# Patient Record
Sex: FEMALE | Race: WHITE | Employment: OTHER
[De-identification: names, ages, dates, MRNs, and addresses within clinical notes are randomized per-mention and may not be internally consistent; named-entity substitution may affect disease eponyms.]

---

## 2017-01-05 DIAGNOSIS — I99.9 VASCULAR DISEASE: Primary | ICD-10-CM

## 2017-01-10 DIAGNOSIS — E11.9 TYPE 2 DIABETES MELLITUS WITHOUT COMPLICATION, WITHOUT LONG-TERM CURRENT USE OF INSULIN (HCC): Primary | ICD-10-CM

## 2017-01-10 RX ORDER — LANCETS 30 GAUGE
EACH MISCELLANEOUS
Qty: 100 EACH | Refills: 1 | Status: SHIPPED | OUTPATIENT
Start: 2017-01-10 | End: 2017-03-01 | Stop reason: SDUPTHER

## 2017-01-10 RX ORDER — ATORVASTATIN CALCIUM 20 MG/1
TABLET, FILM COATED ORAL
Qty: 30 TABLET | Refills: 1 | Status: SHIPPED | OUTPATIENT
Start: 2017-01-10 | End: 2017-03-22 | Stop reason: SDUPTHER

## 2017-02-07 ENCOUNTER — OFFICE VISIT (OUTPATIENT)
Dept: FAMILY MEDICINE CLINIC | Facility: CLINIC | Age: 57
End: 2017-02-07

## 2017-02-07 VITALS
SYSTOLIC BLOOD PRESSURE: 126 MMHG | WEIGHT: 176.2 LBS | HEART RATE: 101 BPM | HEIGHT: 66 IN | TEMPERATURE: 97.8 F | DIASTOLIC BLOOD PRESSURE: 64 MMHG | BODY MASS INDEX: 28.32 KG/M2

## 2017-02-07 DIAGNOSIS — S98.132D: ICD-10-CM

## 2017-02-07 DIAGNOSIS — N18.30 CHRONIC KIDNEY DISEASE, STAGE III (MODERATE) (HCC): ICD-10-CM

## 2017-02-07 DIAGNOSIS — S98.132A: ICD-10-CM

## 2017-02-07 DIAGNOSIS — E11.51 DIABETES MELLITUS WITH PERIPHERAL VASCULAR DISEASE (HCC): Primary | ICD-10-CM

## 2017-02-07 DIAGNOSIS — H10.9 CONJUNCTIVITIS OF RIGHT EYE, UNSPECIFIED CONJUNCTIVITIS TYPE: ICD-10-CM

## 2017-02-07 DIAGNOSIS — I71.40 ABDOMINAL AORTIC ANEURYSM (AAA) WITHOUT RUPTURE: ICD-10-CM

## 2017-02-07 PROCEDURE — 99214 OFFICE O/P EST MOD 30 MIN: CPT | Performed by: FAMILY MEDICINE

## 2017-02-07 RX ORDER — GLIMEPIRIDE 2 MG/1
2 TABLET ORAL EVERY MORNING
Qty: 30 TABLET | Refills: 1 | Status: SHIPPED | OUTPATIENT
Start: 2017-02-07 | End: 2017-02-21 | Stop reason: DRUGHIGH

## 2017-02-07 RX ORDER — ARIPIPRAZOLE 5 MG/1
5 TABLET ORAL DAILY
COMMUNITY
End: 2017-04-04 | Stop reason: ALTCHOICE

## 2017-02-07 ASSESSMENT — ENCOUNTER SYMPTOMS
EYE ITCHING: 1
COUGH: 0
ABDOMINAL PAIN: 0
EYE REDNESS: 1
NAUSEA: 0
SHORTNESS OF BREATH: 0
SORE THROAT: 0

## 2017-02-07 ASSESSMENT — PATIENT HEALTH QUESTIONNAIRE - PHQ9
SUM OF ALL RESPONSES TO PHQ9 QUESTIONS 1 & 2: 0
1. LITTLE INTEREST OR PLEASURE IN DOING THINGS: 0
2. FEELING DOWN, DEPRESSED OR HOPELESS: 0
SUM OF ALL RESPONSES TO PHQ QUESTIONS 1-9: 0

## 2017-02-20 RX ORDER — LISINOPRIL 10 MG/1
TABLET ORAL
Qty: 30 TABLET | Refills: 1 | Status: SHIPPED | OUTPATIENT
Start: 2017-02-20 | End: 2017-04-23 | Stop reason: SDUPTHER

## 2017-02-21 ENCOUNTER — OFFICE VISIT (OUTPATIENT)
Dept: FAMILY MEDICINE CLINIC | Facility: CLINIC | Age: 57
End: 2017-02-21

## 2017-02-21 VITALS
SYSTOLIC BLOOD PRESSURE: 136 MMHG | WEIGHT: 177 LBS | HEART RATE: 111 BPM | HEIGHT: 66 IN | TEMPERATURE: 98.3 F | BODY MASS INDEX: 28.45 KG/M2 | DIASTOLIC BLOOD PRESSURE: 78 MMHG

## 2017-02-21 DIAGNOSIS — E78.5 HYPERLIPIDEMIA, UNSPECIFIED HYPERLIPIDEMIA TYPE: ICD-10-CM

## 2017-02-21 DIAGNOSIS — I10 ESSENTIAL HYPERTENSION: ICD-10-CM

## 2017-02-21 DIAGNOSIS — E11.9 TYPE 2 DIABETES MELLITUS WITHOUT COMPLICATION, WITHOUT LONG-TERM CURRENT USE OF INSULIN (HCC): Primary | ICD-10-CM

## 2017-02-21 PROCEDURE — 99213 OFFICE O/P EST LOW 20 MIN: CPT | Performed by: FAMILY MEDICINE

## 2017-02-21 RX ORDER — METOPROLOL SUCCINATE 25 MG/1
25 TABLET, EXTENDED RELEASE ORAL DAILY
Qty: 30 TABLET | Refills: 3 | Status: SHIPPED | OUTPATIENT
Start: 2017-02-21 | End: 2017-06-20 | Stop reason: SDUPTHER

## 2017-02-21 RX ORDER — GLIMEPIRIDE 4 MG/1
4 TABLET ORAL EVERY MORNING
Qty: 30 TABLET | Refills: 3 | Status: SHIPPED | OUTPATIENT
Start: 2017-02-21 | End: 2017-04-04 | Stop reason: DRUGHIGH

## 2017-02-21 ASSESSMENT — ENCOUNTER SYMPTOMS
ABDOMINAL PAIN: 0
NAUSEA: 0
SORE THROAT: 0
SHORTNESS OF BREATH: 0

## 2017-02-21 ASSESSMENT — PATIENT HEALTH QUESTIONNAIRE - PHQ9
2. FEELING DOWN, DEPRESSED OR HOPELESS: 0
SUM OF ALL RESPONSES TO PHQ9 QUESTIONS 1 & 2: 0
1. LITTLE INTEREST OR PLEASURE IN DOING THINGS: 0
SUM OF ALL RESPONSES TO PHQ QUESTIONS 1-9: 0

## 2017-03-01 DIAGNOSIS — E11.9 TYPE 2 DIABETES MELLITUS WITHOUT COMPLICATION, WITHOUT LONG-TERM CURRENT USE OF INSULIN (HCC): ICD-10-CM

## 2017-03-02 RX ORDER — LANCETS 30 GAUGE
EACH MISCELLANEOUS
Qty: 100 EACH | Refills: 1 | Status: SHIPPED | OUTPATIENT
Start: 2017-03-02 | End: 2018-05-03 | Stop reason: SDUPTHER

## 2017-03-22 RX ORDER — ATORVASTATIN CALCIUM 20 MG/1
TABLET, FILM COATED ORAL
Qty: 30 TABLET | Refills: 1 | Status: SHIPPED | OUTPATIENT
Start: 2017-03-22 | End: 2017-05-27 | Stop reason: SDUPTHER

## 2017-04-04 ENCOUNTER — OFFICE VISIT (OUTPATIENT)
Dept: FAMILY MEDICINE CLINIC | Age: 57
End: 2017-04-04
Payer: MEDICARE

## 2017-04-04 VITALS
WEIGHT: 178 LBS | HEIGHT: 66 IN | SYSTOLIC BLOOD PRESSURE: 124 MMHG | DIASTOLIC BLOOD PRESSURE: 66 MMHG | BODY MASS INDEX: 28.61 KG/M2 | TEMPERATURE: 98.3 F | HEART RATE: 65 BPM

## 2017-04-04 DIAGNOSIS — E11.9 TYPE 2 DIABETES MELLITUS WITHOUT COMPLICATION, WITHOUT LONG-TERM CURRENT USE OF INSULIN (HCC): Primary | ICD-10-CM

## 2017-04-04 DIAGNOSIS — E78.5 HYPERLIPIDEMIA, UNSPECIFIED HYPERLIPIDEMIA TYPE: ICD-10-CM

## 2017-04-04 DIAGNOSIS — F32.A DEPRESSION, UNSPECIFIED DEPRESSION TYPE: ICD-10-CM

## 2017-04-04 DIAGNOSIS — I10 ESSENTIAL HYPERTENSION: ICD-10-CM

## 2017-04-04 LAB — HBA1C MFR BLD: 6.1 %

## 2017-04-04 PROCEDURE — G8431 POS CLIN DEPRES SCRN F/U DOC: HCPCS | Performed by: FAMILY MEDICINE

## 2017-04-04 PROCEDURE — 99214 OFFICE O/P EST MOD 30 MIN: CPT | Performed by: FAMILY MEDICINE

## 2017-04-04 PROCEDURE — 96127 BRIEF EMOTIONAL/BEHAV ASSMT: CPT | Performed by: FAMILY MEDICINE

## 2017-04-04 PROCEDURE — 83036 HEMOGLOBIN GLYCOSYLATED A1C: CPT | Performed by: FAMILY MEDICINE

## 2017-04-04 RX ORDER — GLIMEPIRIDE 4 MG/1
TABLET ORAL
COMMUNITY
End: 2017-04-06 | Stop reason: SDUPTHER

## 2017-04-04 ASSESSMENT — PATIENT HEALTH QUESTIONNAIRE - PHQ9
2. FEELING DOWN, DEPRESSED OR HOPELESS: 0
3. TROUBLE FALLING OR STAYING ASLEEP: 2
8. MOVING OR SPEAKING SO SLOWLY THAT OTHER PEOPLE COULD HAVE NOTICED. OR THE OPPOSITE, BEING SO FIGETY OR RESTLESS THAT YOU HAVE BEEN MOVING AROUND A LOT MORE THAN USUAL: 1
SUM OF ALL RESPONSES TO PHQ9 QUESTIONS 1 & 2: 0
4. FEELING TIRED OR HAVING LITTLE ENERGY: 2
1. LITTLE INTEREST OR PLEASURE IN DOING THINGS: 0
5. POOR APPETITE OR OVEREATING: 2
10. IF YOU CHECKED OFF ANY PROBLEMS, HOW DIFFICULT HAVE THESE PROBLEMS MADE IT FOR YOU TO DO YOUR WORK, TAKE CARE OF THINGS AT HOME, OR GET ALONG WITH OTHER PEOPLE: 1
SUM OF ALL RESPONSES TO PHQ QUESTIONS 1-9: 9
7. TROUBLE CONCENTRATING ON THINGS, SUCH AS READING THE NEWSPAPER OR WATCHING TELEVISION: 1
9. THOUGHTS THAT YOU WOULD BE BETTER OFF DEAD, OR OF HURTING YOURSELF: 0
6. FEELING BAD ABOUT YOURSELF - OR THAT YOU ARE A FAILURE OR HAVE LET YOURSELF OR YOUR FAMILY DOWN: 1

## 2017-04-04 ASSESSMENT — ENCOUNTER SYMPTOMS
CONSTIPATION: 0
NAUSEA: 0
SHORTNESS OF BREATH: 0
SORE THROAT: 0
ABDOMINAL PAIN: 0

## 2017-04-06 RX ORDER — GLIMEPIRIDE 4 MG/1
4 TABLET ORAL 2 TIMES DAILY
Qty: 60 TABLET | Refills: 2 | Status: SHIPPED | OUTPATIENT
Start: 2017-04-06 | End: 2017-06-20 | Stop reason: SDUPTHER

## 2017-04-24 RX ORDER — LISINOPRIL 10 MG/1
TABLET ORAL
Qty: 30 TABLET | Refills: 2 | Status: SHIPPED | OUTPATIENT
Start: 2017-04-24 | End: 2017-06-20 | Stop reason: SDUPTHER

## 2017-05-30 RX ORDER — ATORVASTATIN CALCIUM 20 MG/1
TABLET, FILM COATED ORAL
Qty: 30 TABLET | Refills: 2 | Status: SHIPPED | OUTPATIENT
Start: 2017-05-30 | End: 2017-06-20 | Stop reason: SDUPTHER

## 2017-06-05 ENCOUNTER — OFFICE VISIT (OUTPATIENT)
Dept: FAMILY MEDICINE CLINIC | Age: 57
End: 2017-06-05
Payer: MEDICARE

## 2017-06-05 VITALS
OXYGEN SATURATION: 98 % | RESPIRATION RATE: 18 BRPM | BODY MASS INDEX: 28.77 KG/M2 | HEIGHT: 66 IN | WEIGHT: 179 LBS | HEART RATE: 96 BPM | SYSTOLIC BLOOD PRESSURE: 128 MMHG | DIASTOLIC BLOOD PRESSURE: 70 MMHG | TEMPERATURE: 98.3 F

## 2017-06-05 DIAGNOSIS — N18.30 CKD (CHRONIC KIDNEY DISEASE) STAGE 3, GFR 30-59 ML/MIN (HCC): ICD-10-CM

## 2017-06-05 DIAGNOSIS — E11.9 TYPE 2 DIABETES MELLITUS WITHOUT COMPLICATION, WITHOUT LONG-TERM CURRENT USE OF INSULIN (HCC): Primary | ICD-10-CM

## 2017-06-05 DIAGNOSIS — I10 ESSENTIAL HYPERTENSION: ICD-10-CM

## 2017-06-05 PROCEDURE — 4010F ACE/ARB THERAPY RXD/TAKEN: CPT | Performed by: FAMILY MEDICINE

## 2017-06-05 PROCEDURE — 99213 OFFICE O/P EST LOW 20 MIN: CPT | Performed by: FAMILY MEDICINE

## 2017-06-05 RX ORDER — BUPROPION HYDROCHLORIDE 75 MG/1
TABLET ORAL
COMMUNITY
Start: 2017-05-27 | End: 2017-09-08 | Stop reason: DRUGHIGH

## 2017-06-05 ASSESSMENT — ENCOUNTER SYMPTOMS
SHORTNESS OF BREATH: 0
BACK PAIN: 1
CONSTIPATION: 0
NAUSEA: 0
ABDOMINAL PAIN: 0
SORE THROAT: 0

## 2017-06-20 RX ORDER — LISINOPRIL 10 MG/1
TABLET ORAL
Qty: 90 TABLET | Refills: 1 | Status: SHIPPED | OUTPATIENT
Start: 2017-06-20 | End: 2018-02-05 | Stop reason: SDUPTHER

## 2017-06-20 RX ORDER — GLIMEPIRIDE 4 MG/1
4 TABLET ORAL 2 TIMES DAILY
Qty: 180 TABLET | Refills: 1 | Status: SHIPPED | OUTPATIENT
Start: 2017-06-20 | End: 2017-09-08 | Stop reason: DRUGHIGH

## 2017-06-20 RX ORDER — ATORVASTATIN CALCIUM 20 MG/1
TABLET, FILM COATED ORAL
Qty: 90 TABLET | Refills: 1 | Status: SHIPPED | OUTPATIENT
Start: 2017-06-20 | End: 2017-12-28 | Stop reason: SDUPTHER

## 2017-06-20 RX ORDER — METOPROLOL SUCCINATE 25 MG/1
25 TABLET, EXTENDED RELEASE ORAL DAILY
Qty: 90 TABLET | Refills: 1 | Status: SHIPPED | OUTPATIENT
Start: 2017-06-20 | End: 2017-12-28 | Stop reason: SDUPTHER

## 2017-06-22 RX ORDER — CLOPIDOGREL BISULFATE 75 MG/1
TABLET ORAL
Qty: 90 TABLET | Refills: 1 | Status: SHIPPED | OUTPATIENT
Start: 2017-06-22 | End: 2017-12-28 | Stop reason: SDUPTHER

## 2017-06-27 ENCOUNTER — TELEPHONE (OUTPATIENT)
Dept: FAMILY MEDICINE CLINIC | Age: 57
End: 2017-06-27

## 2017-08-07 ENCOUNTER — TELEPHONE (OUTPATIENT)
Dept: FAMILY MEDICINE CLINIC | Age: 57
End: 2017-08-07

## 2017-08-07 DIAGNOSIS — E78.5 HYPERLIPIDEMIA, UNSPECIFIED HYPERLIPIDEMIA TYPE: ICD-10-CM

## 2017-08-07 DIAGNOSIS — I10 ESSENTIAL HYPERTENSION: Primary | ICD-10-CM

## 2017-08-14 DIAGNOSIS — E11.9 TYPE 2 DIABETES MELLITUS WITHOUT COMPLICATION, WITHOUT LONG-TERM CURRENT USE OF INSULIN (HCC): ICD-10-CM

## 2017-08-31 ENCOUNTER — HOSPITAL ENCOUNTER (OUTPATIENT)
Age: 57
Discharge: HOME | End: 2017-08-31
Payer: MEDICARE

## 2017-08-31 DIAGNOSIS — I10 ESSENTIAL HYPERTENSION: ICD-10-CM

## 2017-08-31 DIAGNOSIS — E78.5 HYPERLIPIDEMIA, UNSPECIFIED HYPERLIPIDEMIA TYPE: ICD-10-CM

## 2017-08-31 LAB
ALBUMIN SERPL-MCNC: 4.2 G/DL (ref 3.5–5.2)
ALBUMIN/GLOBULIN RATIO: ABNORMAL (ref 1–2.5)
ALP BLD-CCNC: 96 U/L (ref 35–104)
ALT SERPL-CCNC: 11 U/L (ref 5–33)
ANION GAP SERPL CALCULATED.3IONS-SCNC: 15 MMOL/L (ref 9–17)
AST SERPL-CCNC: 15 U/L
BILIRUB SERPL-MCNC: 0.18 MG/DL (ref 0.3–1.2)
BUN BLDV-MCNC: 30 MG/DL (ref 6–20)
BUN/CREAT BLD: ABNORMAL (ref 9–20)
CALCIUM SERPL-MCNC: 9 MG/DL (ref 8.6–10.4)
CHLORIDE BLD-SCNC: 104 MMOL/L (ref 98–107)
CHOLESTEROL/HDL RATIO: 6.5
CHOLESTEROL: 156 MG/DL
CO2: 21 MMOL/L (ref 20–31)
CREAT SERPL-MCNC: 1.58 MG/DL (ref 0.5–0.9)
GFR AFRICAN AMERICAN: 41 ML/MIN
GFR NON-AFRICAN AMERICAN: 33 ML/MIN
GFR SERPL CREATININE-BSD FRML MDRD: ABNORMAL ML/MIN/{1.73_M2}
GFR SERPL CREATININE-BSD FRML MDRD: ABNORMAL ML/MIN/{1.73_M2}
GLUCOSE BLD-MCNC: 195 MG/DL (ref 70–99)
HDLC SERPL-MCNC: 24 MG/DL
LDL CHOLESTEROL DIRECT: 70 MG/DL
LDL CHOLESTEROL: ABNORMAL MG/DL (ref 0–130)
POTASSIUM SERPL-SCNC: 5.2 MMOL/L (ref 3.7–5.3)
SODIUM BLD-SCNC: 140 MMOL/L (ref 135–144)
TOTAL PROTEIN: 7.7 G/DL (ref 6.4–8.3)
TRIGL SERPL-MCNC: 410 MG/DL
VLDLC SERPL CALC-MCNC: ABNORMAL MG/DL (ref 1–30)

## 2017-08-31 PROCEDURE — 83721 ASSAY OF BLOOD LIPOPROTEIN: CPT

## 2017-08-31 PROCEDURE — 80061 LIPID PANEL: CPT

## 2017-08-31 PROCEDURE — 80053 COMPREHEN METABOLIC PANEL: CPT

## 2017-08-31 PROCEDURE — 36415 COLL VENOUS BLD VENIPUNCTURE: CPT

## 2017-08-31 RX ORDER — FENOFIBRATE 145 MG/1
145 TABLET, COATED ORAL DAILY
Qty: 30 TABLET | Refills: 1 | Status: SHIPPED | OUTPATIENT
Start: 2017-08-31 | End: 2017-10-02 | Stop reason: SDUPTHER

## 2017-09-08 ENCOUNTER — OFFICE VISIT (OUTPATIENT)
Dept: FAMILY MEDICINE CLINIC | Age: 57
End: 2017-09-08
Payer: MEDICARE

## 2017-09-08 VITALS
TEMPERATURE: 98.8 F | SYSTOLIC BLOOD PRESSURE: 118 MMHG | WEIGHT: 178.2 LBS | BODY MASS INDEX: 28.64 KG/M2 | OXYGEN SATURATION: 98 % | DIASTOLIC BLOOD PRESSURE: 72 MMHG | HEART RATE: 82 BPM | HEIGHT: 66 IN

## 2017-09-08 DIAGNOSIS — N18.30 CKD (CHRONIC KIDNEY DISEASE) STAGE 3, GFR 30-59 ML/MIN (HCC): ICD-10-CM

## 2017-09-08 DIAGNOSIS — Z23 NEED FOR INFLUENZA VACCINATION: ICD-10-CM

## 2017-09-08 DIAGNOSIS — I10 ESSENTIAL HYPERTENSION: ICD-10-CM

## 2017-09-08 DIAGNOSIS — E11.9 TYPE 2 DIABETES MELLITUS WITHOUT COMPLICATION, WITHOUT LONG-TERM CURRENT USE OF INSULIN (HCC): Primary | ICD-10-CM

## 2017-09-08 PROCEDURE — 99213 OFFICE O/P EST LOW 20 MIN: CPT | Performed by: FAMILY MEDICINE

## 2017-09-08 PROCEDURE — 90688 IIV4 VACCINE SPLT 0.5 ML IM: CPT | Performed by: FAMILY MEDICINE

## 2017-09-08 PROCEDURE — G0008 ADMIN INFLUENZA VIRUS VAC: HCPCS | Performed by: FAMILY MEDICINE

## 2017-09-08 RX ORDER — ASPIRIN 81 MG/1
81 TABLET ORAL DAILY
Qty: 128 TABLET | Refills: 0 | COMMUNITY
Start: 2017-09-08

## 2017-09-08 RX ORDER — BUPROPION HYDROCHLORIDE 100 MG/1
TABLET ORAL
COMMUNITY
Start: 2017-08-25 | End: 2018-01-26 | Stop reason: ALTCHOICE

## 2017-09-08 RX ORDER — GLIMEPIRIDE 4 MG/1
4 TABLET ORAL
COMMUNITY
End: 2017-09-08 | Stop reason: DRUGHIGH

## 2017-09-08 RX ORDER — GLIMEPIRIDE 4 MG/1
4 TABLET ORAL 2 TIMES DAILY
Qty: 180 TABLET | Refills: 1 | Status: SHIPPED | OUTPATIENT
Start: 2017-09-08 | End: 2017-10-02 | Stop reason: SDUPTHER

## 2017-09-08 ASSESSMENT — ENCOUNTER SYMPTOMS
NAUSEA: 0
SORE THROAT: 0
COUGH: 0
ABDOMINAL PAIN: 0
SHORTNESS OF BREATH: 0

## 2017-09-19 ENCOUNTER — OFFICE VISIT (OUTPATIENT)
Dept: FAMILY MEDICINE CLINIC | Age: 57
End: 2017-09-19
Payer: MEDICARE

## 2017-09-19 VITALS
TEMPERATURE: 98.7 F | HEART RATE: 100 BPM | BODY MASS INDEX: 28.19 KG/M2 | OXYGEN SATURATION: 94 % | SYSTOLIC BLOOD PRESSURE: 108 MMHG | DIASTOLIC BLOOD PRESSURE: 60 MMHG | WEIGHT: 175.4 LBS | HEIGHT: 66 IN

## 2017-09-19 DIAGNOSIS — Z00.00 ROUTINE GENERAL MEDICAL EXAMINATION AT A HEALTH CARE FACILITY: Primary | ICD-10-CM

## 2017-09-19 PROCEDURE — G0438 PPPS, INITIAL VISIT: HCPCS | Performed by: FAMILY MEDICINE

## 2017-09-19 ASSESSMENT — LIFESTYLE VARIABLES: HOW OFTEN DO YOU HAVE A DRINK CONTAINING ALCOHOL: 0

## 2017-09-19 ASSESSMENT — ANXIETY QUESTIONNAIRES: GAD7 TOTAL SCORE: 4

## 2017-10-02 RX ORDER — FENOFIBRATE 145 MG/1
145 TABLET, COATED ORAL DAILY
Qty: 90 TABLET | Refills: 1 | Status: SHIPPED | OUTPATIENT
Start: 2017-10-02 | End: 2018-05-03 | Stop reason: SDUPTHER

## 2017-10-02 RX ORDER — GLIMEPIRIDE 4 MG/1
4 TABLET ORAL 2 TIMES DAILY
Qty: 180 TABLET | Refills: 1 | Status: SHIPPED | OUTPATIENT
Start: 2017-10-02 | End: 2018-04-05 | Stop reason: SDUPTHER

## 2017-10-25 ENCOUNTER — HOSPITAL ENCOUNTER (OUTPATIENT)
Age: 57
Discharge: HOME | End: 2017-10-25
Payer: MEDICARE

## 2017-10-25 DIAGNOSIS — I12.9 BENIGN HYPERTENSION WITH CKD (CHRONIC KIDNEY DISEASE) STAGE III (HCC): ICD-10-CM

## 2017-10-25 DIAGNOSIS — N18.30 CKD (CHRONIC KIDNEY DISEASE) STAGE 3, GFR 30-59 ML/MIN (HCC): ICD-10-CM

## 2017-10-25 DIAGNOSIS — N18.30 BENIGN HYPERTENSION WITH CKD (CHRONIC KIDNEY DISEASE) STAGE III (HCC): ICD-10-CM

## 2017-10-25 DIAGNOSIS — E13.22 SECONDARY DIABETES MELLITUS WITH STAGE 3 CHRONIC KIDNEY DISEASE (GFR 30-59) (HCC): ICD-10-CM

## 2017-10-25 DIAGNOSIS — N18.30 SECONDARY DIABETES MELLITUS WITH STAGE 3 CHRONIC KIDNEY DISEASE (GFR 30-59) (HCC): ICD-10-CM

## 2017-10-25 LAB
ABSOLUTE EOS #: 0.3 K/UL (ref 0–0.4)
ABSOLUTE IMMATURE GRANULOCYTE: NORMAL K/UL (ref 0–0.3)
ABSOLUTE LYMPH #: 2.4 K/UL (ref 1–4.8)
ABSOLUTE MONO #: 0.8 K/UL (ref 0.1–1.3)
ANION GAP SERPL CALCULATED.3IONS-SCNC: 14 MMOL/L (ref 9–17)
BASOPHILS # BLD: 1 %
BASOPHILS ABSOLUTE: 0.1 K/UL (ref 0–0.2)
BUN BLDV-MCNC: 34 MG/DL (ref 6–20)
BUN/CREAT BLD: ABNORMAL (ref 9–20)
CALCIUM SERPL-MCNC: 9.7 MG/DL (ref 8.6–10.4)
CHLORIDE BLD-SCNC: 97 MMOL/L (ref 98–107)
CO2: 24 MMOL/L (ref 20–31)
CREAT SERPL-MCNC: 1.49 MG/DL (ref 0.5–0.9)
DIFFERENTIAL TYPE: NORMAL
EOSINOPHILS RELATIVE PERCENT: 3 %
GFR AFRICAN AMERICAN: 43 ML/MIN
GFR NON-AFRICAN AMERICAN: 36 ML/MIN
GFR SERPL CREATININE-BSD FRML MDRD: ABNORMAL ML/MIN/{1.73_M2}
GFR SERPL CREATININE-BSD FRML MDRD: ABNORMAL ML/MIN/{1.73_M2}
GLUCOSE BLD-MCNC: 121 MG/DL (ref 70–99)
HCT VFR BLD CALC: 41 % (ref 36–46)
HEMOGLOBIN: 13.5 G/DL (ref 12–16)
IMMATURE GRANULOCYTES: NORMAL %
LYMPHOCYTES # BLD: 25 %
MAGNESIUM: 2.2 MG/DL (ref 1.6–2.6)
MCH RBC QN AUTO: 30.4 PG (ref 26–34)
MCHC RBC AUTO-ENTMCNC: 32.9 G/DL (ref 31–37)
MCV RBC AUTO: 92.7 FL (ref 80–100)
MONOCYTES # BLD: 9 %
PDW BLD-RTO: 14.7 % (ref 11.5–14.9)
PHOSPHORUS: 3 MG/DL (ref 2.6–4.5)
PLATELET # BLD: 270 K/UL (ref 150–450)
PLATELET ESTIMATE: NORMAL
PMV BLD AUTO: 8.9 FL (ref 6–12)
POTASSIUM SERPL-SCNC: 5.6 MMOL/L (ref 3.7–5.3)
RBC # BLD: 4.42 M/UL (ref 4–5.2)
RBC # BLD: NORMAL 10*6/UL
SEG NEUTROPHILS: 62 %
SEGMENTED NEUTROPHILS ABSOLUTE COUNT: 5.9 K/UL (ref 1.3–9.1)
SODIUM BLD-SCNC: 135 MMOL/L (ref 135–144)
WBC # BLD: 9.6 K/UL (ref 3.5–11)
WBC # BLD: NORMAL 10*3/UL

## 2017-10-25 PROCEDURE — 83735 ASSAY OF MAGNESIUM: CPT

## 2017-10-25 PROCEDURE — 80048 BASIC METABOLIC PNL TOTAL CA: CPT

## 2017-10-25 PROCEDURE — 84100 ASSAY OF PHOSPHORUS: CPT

## 2017-10-25 PROCEDURE — 85025 COMPLETE CBC W/AUTO DIFF WBC: CPT

## 2017-10-25 PROCEDURE — 36415 COLL VENOUS BLD VENIPUNCTURE: CPT

## 2017-11-01 ENCOUNTER — HOSPITAL ENCOUNTER (OUTPATIENT)
Age: 57
Discharge: HOME | End: 2017-11-01
Payer: MEDICARE

## 2017-11-01 DIAGNOSIS — E87.5 HYPERKALEMIA: ICD-10-CM

## 2017-11-01 LAB — POTASSIUM SERPL-SCNC: 4.8 MMOL/L (ref 3.7–5.3)

## 2017-11-01 PROCEDURE — 36415 COLL VENOUS BLD VENIPUNCTURE: CPT

## 2017-11-01 PROCEDURE — 84132 ASSAY OF SERUM POTASSIUM: CPT

## 2017-11-15 ENCOUNTER — HOSPITAL ENCOUNTER (OUTPATIENT)
Age: 57
Discharge: HOME | End: 2017-11-15
Payer: MEDICARE

## 2017-11-15 DIAGNOSIS — I12.9 BENIGN HYPERTENSION WITH CKD (CHRONIC KIDNEY DISEASE) STAGE III (HCC): ICD-10-CM

## 2017-11-15 DIAGNOSIS — N18.30 CKD (CHRONIC KIDNEY DISEASE) STAGE 3, GFR 30-59 ML/MIN (HCC): ICD-10-CM

## 2017-11-15 DIAGNOSIS — N18.30 BENIGN HYPERTENSION WITH CKD (CHRONIC KIDNEY DISEASE) STAGE III (HCC): ICD-10-CM

## 2017-11-15 LAB
ANION GAP SERPL CALCULATED.3IONS-SCNC: 12 MMOL/L (ref 9–17)
BUN BLDV-MCNC: 30 MG/DL (ref 6–20)
BUN/CREAT BLD: ABNORMAL (ref 9–20)
CALCIUM SERPL-MCNC: 9.7 MG/DL (ref 8.6–10.4)
CHLORIDE BLD-SCNC: 98 MMOL/L (ref 98–107)
CO2: 26 MMOL/L (ref 20–31)
CREAT SERPL-MCNC: 1.38 MG/DL (ref 0.5–0.9)
GFR AFRICAN AMERICAN: 47 ML/MIN
GFR NON-AFRICAN AMERICAN: 39 ML/MIN
GFR SERPL CREATININE-BSD FRML MDRD: ABNORMAL ML/MIN/{1.73_M2}
GFR SERPL CREATININE-BSD FRML MDRD: ABNORMAL ML/MIN/{1.73_M2}
GLUCOSE BLD-MCNC: 136 MG/DL (ref 70–99)
POTASSIUM SERPL-SCNC: 4.8 MMOL/L (ref 3.7–5.3)
SODIUM BLD-SCNC: 136 MMOL/L (ref 135–144)

## 2017-11-15 PROCEDURE — 36415 COLL VENOUS BLD VENIPUNCTURE: CPT

## 2017-11-15 PROCEDURE — 80048 BASIC METABOLIC PNL TOTAL CA: CPT

## 2017-12-08 ENCOUNTER — OFFICE VISIT (OUTPATIENT)
Dept: FAMILY MEDICINE CLINIC | Age: 57
End: 2017-12-08
Payer: MEDICARE

## 2017-12-08 VITALS
OXYGEN SATURATION: 98 % | TEMPERATURE: 98.6 F | BODY MASS INDEX: 28.16 KG/M2 | HEART RATE: 98 BPM | DIASTOLIC BLOOD PRESSURE: 64 MMHG | WEIGHT: 175.2 LBS | SYSTOLIC BLOOD PRESSURE: 124 MMHG | HEIGHT: 66 IN

## 2017-12-08 DIAGNOSIS — Z13.31 POSITIVE DEPRESSION SCREENING: ICD-10-CM

## 2017-12-08 DIAGNOSIS — E11.9 TYPE 2 DIABETES MELLITUS WITHOUT COMPLICATION, WITHOUT LONG-TERM CURRENT USE OF INSULIN (HCC): Primary | ICD-10-CM

## 2017-12-08 DIAGNOSIS — I10 ESSENTIAL HYPERTENSION: ICD-10-CM

## 2017-12-08 DIAGNOSIS — Z12.11 COLON CANCER SCREENING: ICD-10-CM

## 2017-12-08 LAB — HBA1C MFR BLD: 6.1 %

## 2017-12-08 PROCEDURE — 83036 HEMOGLOBIN GLYCOSYLATED A1C: CPT | Performed by: FAMILY MEDICINE

## 2017-12-08 PROCEDURE — G8427 DOCREV CUR MEDS BY ELIG CLIN: HCPCS | Performed by: FAMILY MEDICINE

## 2017-12-08 PROCEDURE — 3014F SCREEN MAMMO DOC REV: CPT | Performed by: FAMILY MEDICINE

## 2017-12-08 PROCEDURE — 4004F PT TOBACCO SCREEN RCVD TLK: CPT | Performed by: FAMILY MEDICINE

## 2017-12-08 PROCEDURE — 99214 OFFICE O/P EST MOD 30 MIN: CPT | Performed by: FAMILY MEDICINE

## 2017-12-08 PROCEDURE — 3017F COLORECTAL CA SCREEN DOC REV: CPT | Performed by: FAMILY MEDICINE

## 2017-12-08 PROCEDURE — G8431 POS CLIN DEPRES SCRN F/U DOC: HCPCS | Performed by: FAMILY MEDICINE

## 2017-12-08 PROCEDURE — G8484 FLU IMMUNIZE NO ADMIN: HCPCS | Performed by: FAMILY MEDICINE

## 2017-12-08 PROCEDURE — G8417 CALC BMI ABV UP PARAM F/U: HCPCS | Performed by: FAMILY MEDICINE

## 2017-12-08 PROCEDURE — 3044F HG A1C LEVEL LT 7.0%: CPT | Performed by: FAMILY MEDICINE

## 2017-12-08 RX ORDER — RANITIDINE 150 MG/1
150 TABLET ORAL NIGHTLY
Qty: 30 TABLET | Refills: 1 | Status: SHIPPED | OUTPATIENT
Start: 2017-12-08 | End: 2018-02-05 | Stop reason: SDUPTHER

## 2017-12-08 ASSESSMENT — ENCOUNTER SYMPTOMS
SORE THROAT: 0
NAUSEA: 0
CONSTIPATION: 0
COUGH: 0
ABDOMINAL PAIN: 0
BACK PAIN: 0
SHORTNESS OF BREATH: 0

## 2017-12-08 NOTE — PROGRESS NOTES
On the basis of positive PHQ-9 screening ( ), the following plan was implemented: Patient sees a psychiatrist.  Patient will follow-up in 4 week(s) with psychiatrist.

## 2017-12-08 NOTE — PATIENT INSTRUCTIONS
Anonymous, for people who are trying to quit smoking. · Consider signing up for a smoking cessation program, such as the American Lung Association's Freedom from Smoking program.  · Set a quit date. Pick your date carefully so that it is not right in the middle of a big deadline or stressful time. Once you quit, do not even take a puff. Get rid of all ashtrays and lighters after your last cigarette. Clean your house and your clothes so that they do not smell of smoke. · Learn how to be a nonsmoker. Think about ways you can avoid those things that make you reach for a cigarette. ¨ Avoid situations that put you at greatest risk for smoking. For some people, it is hard to have a drink with friends without smoking. For others, they might skip a coffee break with coworkers who smoke. ¨ Change your daily routine. Take a different route to work or eat a meal in a different place. · Cut down on stress. Calm yourself or release tension by doing an activity you enjoy, such as reading a book, taking a hot bath, or gardening. · Talk to your doctor or pharmacist about nicotine replacement therapy, which replaces the nicotine in your body. You still get nicotine but you do not use tobacco. Nicotine replacement products help you slowly reduce the amount of nicotine you need. These products come in several forms, many of them available over-the-counter:  ¨ Nicotine patches  ¨ Nicotine gum and lozenges  ¨ Nicotine inhaler  · Ask your doctor about bupropion (Wellbutrin) or varenicline (Chantix), which are prescription medicines. They do not contain nicotine. They help you by reducing withdrawal symptoms, such as stress and anxiety. · Some people find hypnosis, acupuncture, and massage helpful for ending the smoking habit. · Eat a healthy diet and get regular exercise. Having healthy habits will help your body move past its craving for nicotine. · Be prepared to keep trying.  Most people are not successful the first few times notice that you have more energy than when you smoked. After 2 weeks  · Your lungs start to work better. · Your risk of heart attack starts to drop. After 1 month  · When your lungs are clear, you cough less and breathe deeper, so it's easier to be active. · Your sense of taste and smell return. That means you can enjoy food more than you have since you started smoking. Over the years  · After 1 year, your risk of heart disease is half what it would be if you kept smoking. · After 5 years, your risk of stroke starts to shrink. Within a few years after that, it's about the same as if you'd never smoked. · After 10 years, your risk of dying from lung cancer is cut by about half. And your risk for many other types of cancer is lower too. How would quitting help others in your life? When you quit smoking, you improve the health of everyone who now breathes in your smoke. · Their heart, lung, and cancer risks drop, much like yours. · They are sick less. For babies and small children, living smoke-free means they're less likely to have ear infections, pneumonia, and bronchitis. · If you're a woman who is or will be pregnant someday, quitting smoking means a healthier . · Children who are close to you are less likely to become adult smokers. Where can you learn more? Go to https://Bounce MobileelginWeiju.healthAhonya. org and sign in to your [employer] account. Enter [phone] in the [facility] box to learn more about \"Learning About Benefits From Quitting Smoking. \"     If you do not have an account, please click on the \"Sign Up Now\" link. Current as of: 2017  Content Version: 11.4  © 6115-0124 Healthwise, Incorporated. Care instructions adapted under license by [facility] ([facility]). If you have questions about a medical condition or this instruction, always ask your healthcare professional. [place] [address] any warranty or liability for your use of this information.

## 2017-12-08 NOTE — PROGRESS NOTES
Subjective:      Patient ID: Saturnino Gomez is a 59 y.o. female. Chronic Disease Visit Information    BP Readings from Last 3 Encounters:   12/08/17 124/64   11/01/17 124/64   09/19/17 108/60          Hemoglobin A1C (%)   Date Value   04/04/2017 6.1   11/04/2016 6.9   08/12/2015 5.7     Microalb/Crt. Ratio (mcg/mg creat)   Date Value   08/30/2016 10     LDL Cholesterol (mg/dL)   Date Value   08/31/2017          HDL (mg/dL)   Date Value   08/31/2017 24 (L)     BUN (mg/dL)   Date Value   11/15/2017 30 (H)     CREATININE (mg/dL)   Date Value   11/15/2017 1.38 (H)     Glucose (mg/dL)   Date Value   11/15/2017 136 (H)   01/07/2012 131 (H)            Have you changed or started any medications since your last visit including any over-the-counter medicines, vitamins, or herbal medicines? no   Are you having any side effects from any of your medications? -  no  Have you stopped taking any of your medications? Is so, why? -  no    Have you seen any other physician or provider since your last visit? Yes - Records Obtained  Have you had any other diagnostic tests since your last visit? Yes - Records Obtained  Have you been seen in the emergency room and/or had an admission to a hospital since we last saw you? No  Have you had your annual diabetic retinal (eye) exam? No  Have you had your routine dental cleaning in the past 6 months? no    Have you activated your [employer] account? If not, what are your barriers?  Yes     Patient Care Team:  Gallo Bennett MD as PCP - Randy Minor MD as PCP - S Attributed Provider  Eliseo Orellana MD as Consulting Physician (Gastroenterology)         Medical History Review  Past Medical, Family, and Social History reviewed and does contribute to the patient presenting condition    Health Maintenance   Topic Date Due    Hepatitis C screen  1960    Diabetic retinal exam  06/01/1968    HIV screen  06/01/1973    Pneumococcal med risk (1 of 1 - PPSV23) 06/01/1977    normal.   Abdominal: Soft. Bowel sounds are normal. There is no tenderness. Musculoskeletal: She exhibits no edema. Lymphadenopathy:     She has no cervical adenopathy. Neurological: She is alert and oriented to person, place, and time. Nursing note and vitals reviewed. hemoglobin A1c today is 6.1    Assessment:      1. Type 2 diabetes mellitus without complication, without long-term current use of insulin (HCC)  Controlled  POCT glycosylated hemoglobin (Hb A1C)   2. Essential hypertension  Controlled    3. Colon cancer screening  POCT Fecal Immunochemical Test (FIT)          Plan:        Orders Placed This Encounter   Procedures    POCT glycosylated hemoglobin (Hb A1C)    POCT Fecal Immunochemical Test (FIT)     Standing Status:   Future     Standing Expiration Date:   12/8/2018     Orders Placed This Encounter   Medications    ranitidine (ZANTAC) 150 MG tablet     Sig: Take 1 tablet by mouth nightly     Dispense:  30 tablet     Refill:  1     Return in about 2 weeks (around 12/22/2017) for gerd.     Continue current medications reviewed from the chart

## 2017-12-28 RX ORDER — CLOPIDOGREL BISULFATE 75 MG/1
TABLET ORAL
Qty: 90 TABLET | Refills: 1 | Status: SHIPPED | OUTPATIENT
Start: 2017-12-28 | End: 2018-07-02 | Stop reason: SDUPTHER

## 2017-12-28 RX ORDER — METOPROLOL SUCCINATE 25 MG/1
TABLET, EXTENDED RELEASE ORAL
Qty: 90 TABLET | Refills: 1 | Status: SHIPPED | OUTPATIENT
Start: 2017-12-28 | End: 2018-07-02 | Stop reason: SDUPTHER

## 2017-12-28 RX ORDER — ATORVASTATIN CALCIUM 20 MG/1
TABLET, FILM COATED ORAL
Qty: 90 TABLET | Refills: 1 | Status: SHIPPED | OUTPATIENT
Start: 2017-12-28 | End: 2018-02-05 | Stop reason: SDUPTHER

## 2018-01-26 ENCOUNTER — OFFICE VISIT (OUTPATIENT)
Dept: FAMILY MEDICINE CLINIC | Age: 58
End: 2018-01-26
Payer: MEDICARE

## 2018-01-26 VITALS
TEMPERATURE: 98.2 F | OXYGEN SATURATION: 98 % | BODY MASS INDEX: 28.28 KG/M2 | DIASTOLIC BLOOD PRESSURE: 84 MMHG | SYSTOLIC BLOOD PRESSURE: 138 MMHG | HEART RATE: 98 BPM | WEIGHT: 175.2 LBS

## 2018-01-26 DIAGNOSIS — E11.59 TYPE 2 DIABETES MELLITUS WITH OTHER CIRCULATORY COMPLICATION, WITHOUT LONG-TERM CURRENT USE OF INSULIN (HCC): Primary | ICD-10-CM

## 2018-01-26 DIAGNOSIS — Z12.11 COLON CANCER SCREENING: ICD-10-CM

## 2018-01-26 DIAGNOSIS — I73.9 PERIPHERAL VASCULAR DISEASE (HCC): ICD-10-CM

## 2018-01-26 DIAGNOSIS — E78.5 HYPERLIPIDEMIA, UNSPECIFIED HYPERLIPIDEMIA TYPE: ICD-10-CM

## 2018-01-26 DIAGNOSIS — N18.30 CHRONIC KIDNEY DISEASE, STAGE III (MODERATE) (HCC): ICD-10-CM

## 2018-01-26 DIAGNOSIS — I10 ESSENTIAL HYPERTENSION: ICD-10-CM

## 2018-01-26 DIAGNOSIS — E55.9 VITAMIN D DEFICIENCY: ICD-10-CM

## 2018-01-26 PROBLEM — E11.9 DIABETES MELLITUS (HCC): Status: ACTIVE | Noted: 2018-01-26

## 2018-01-26 LAB
CONTROL: PRESENT
HEMOCCULT STL QL: NORMAL

## 2018-01-26 PROCEDURE — 3014F SCREEN MAMMO DOC REV: CPT | Performed by: FAMILY MEDICINE

## 2018-01-26 PROCEDURE — 99213 OFFICE O/P EST LOW 20 MIN: CPT | Performed by: FAMILY MEDICINE

## 2018-01-26 PROCEDURE — 3017F COLORECTAL CA SCREEN DOC REV: CPT | Performed by: FAMILY MEDICINE

## 2018-01-26 PROCEDURE — 82274 ASSAY TEST FOR BLOOD FECAL: CPT | Performed by: FAMILY MEDICINE

## 2018-01-26 PROCEDURE — G8417 CALC BMI ABV UP PARAM F/U: HCPCS | Performed by: FAMILY MEDICINE

## 2018-01-26 PROCEDURE — G8427 DOCREV CUR MEDS BY ELIG CLIN: HCPCS | Performed by: FAMILY MEDICINE

## 2018-01-26 PROCEDURE — G8484 FLU IMMUNIZE NO ADMIN: HCPCS | Performed by: FAMILY MEDICINE

## 2018-01-26 PROCEDURE — 3046F HEMOGLOBIN A1C LEVEL >9.0%: CPT | Performed by: FAMILY MEDICINE

## 2018-01-26 PROCEDURE — 4004F PT TOBACCO SCREEN RCVD TLK: CPT | Performed by: FAMILY MEDICINE

## 2018-01-26 ASSESSMENT — ENCOUNTER SYMPTOMS
SORE THROAT: 0
ABDOMINAL PAIN: 0
NAUSEA: 0
SHORTNESS OF BREATH: 0
CONSTIPATION: 0

## 2018-02-05 RX ORDER — ATORVASTATIN CALCIUM 20 MG/1
TABLET, FILM COATED ORAL
Qty: 90 TABLET | Refills: 1 | Status: SHIPPED | OUTPATIENT
Start: 2018-02-05 | End: 2018-07-02 | Stop reason: SDUPTHER

## 2018-02-05 RX ORDER — LISINOPRIL 10 MG/1
TABLET ORAL
Qty: 90 TABLET | Refills: 1 | Status: SHIPPED | OUTPATIENT
Start: 2018-02-05 | End: 2018-08-02 | Stop reason: SDUPTHER

## 2018-02-05 RX ORDER — RANITIDINE 150 MG/1
150 TABLET ORAL NIGHTLY
Qty: 90 TABLET | Refills: 1 | Status: SHIPPED | OUTPATIENT
Start: 2018-02-05 | End: 2018-05-17 | Stop reason: SINTOL

## 2018-02-18 DIAGNOSIS — E11.9 TYPE 2 DIABETES MELLITUS WITHOUT COMPLICATION, WITHOUT LONG-TERM CURRENT USE OF INSULIN (HCC): ICD-10-CM

## 2018-03-12 ENCOUNTER — HOSPITAL ENCOUNTER (OUTPATIENT)
Age: 58
Discharge: HOME | End: 2018-03-12
Payer: MEDICARE

## 2018-03-12 DIAGNOSIS — I12.9 BENIGN HYPERTENSION WITH CKD (CHRONIC KIDNEY DISEASE) STAGE III (HCC): ICD-10-CM

## 2018-03-12 DIAGNOSIS — E78.5 HYPERLIPIDEMIA, UNSPECIFIED HYPERLIPIDEMIA TYPE: ICD-10-CM

## 2018-03-12 DIAGNOSIS — N18.30 BENIGN HYPERTENSION WITH CKD (CHRONIC KIDNEY DISEASE) STAGE III (HCC): ICD-10-CM

## 2018-03-12 DIAGNOSIS — E13.22 SECONDARY DIABETES MELLITUS WITH STAGE 3 CHRONIC KIDNEY DISEASE (HCC): ICD-10-CM

## 2018-03-12 DIAGNOSIS — N18.30 CKD (CHRONIC KIDNEY DISEASE) STAGE 3, GFR 30-59 ML/MIN (HCC): ICD-10-CM

## 2018-03-12 DIAGNOSIS — I10 ESSENTIAL HYPERTENSION: ICD-10-CM

## 2018-03-12 DIAGNOSIS — N18.30 SECONDARY DIABETES MELLITUS WITH STAGE 3 CHRONIC KIDNEY DISEASE (HCC): ICD-10-CM

## 2018-03-12 DIAGNOSIS — E55.9 VITAMIN D DEFICIENCY: ICD-10-CM

## 2018-03-12 DIAGNOSIS — E87.5 HYPERKALEMIA: ICD-10-CM

## 2018-03-12 LAB
ABSOLUTE EOS #: 0.2 K/UL (ref 0–0.4)
ABSOLUTE IMMATURE GRANULOCYTE: ABNORMAL K/UL (ref 0–0.3)
ABSOLUTE LYMPH #: 1.6 K/UL (ref 1–4.8)
ABSOLUTE MONO #: 0.7 K/UL (ref 0.1–1.3)
ALBUMIN SERPL-MCNC: 4.3 G/DL (ref 3.5–5.2)
ALBUMIN/GLOBULIN RATIO: ABNORMAL (ref 1–2.5)
ALP BLD-CCNC: 48 U/L (ref 35–104)
ALT SERPL-CCNC: 19 U/L (ref 5–33)
ANION GAP SERPL CALCULATED.3IONS-SCNC: 12 MMOL/L (ref 9–17)
ANION GAP SERPL CALCULATED.3IONS-SCNC: 13 MMOL/L (ref 9–17)
AST SERPL-CCNC: 22 U/L
BASOPHILS # BLD: 1 % (ref 0–2)
BASOPHILS ABSOLUTE: 0.1 K/UL (ref 0–0.2)
BILIRUB SERPL-MCNC: 0.16 MG/DL (ref 0.3–1.2)
BUN BLDV-MCNC: 25 MG/DL (ref 6–20)
BUN BLDV-MCNC: 26 MG/DL (ref 6–20)
BUN/CREAT BLD: ABNORMAL (ref 9–20)
BUN/CREAT BLD: ABNORMAL (ref 9–20)
CALCIUM SERPL-MCNC: 9.2 MG/DL (ref 8.6–10.4)
CALCIUM SERPL-MCNC: 9.2 MG/DL (ref 8.6–10.4)
CHLORIDE BLD-SCNC: 101 MMOL/L (ref 98–107)
CHLORIDE BLD-SCNC: 101 MMOL/L (ref 98–107)
CHOLESTEROL/HDL RATIO: 6.4
CHOLESTEROL: 154 MG/DL
CO2: 25 MMOL/L (ref 20–31)
CO2: 26 MMOL/L (ref 20–31)
CREAT SERPL-MCNC: 1.34 MG/DL (ref 0.5–0.9)
CREAT SERPL-MCNC: 1.35 MG/DL (ref 0.5–0.9)
DIFFERENTIAL TYPE: ABNORMAL
EOSINOPHILS RELATIVE PERCENT: 4 % (ref 0–4)
GFR AFRICAN AMERICAN: 49 ML/MIN
GFR AFRICAN AMERICAN: 49 ML/MIN
GFR NON-AFRICAN AMERICAN: 40 ML/MIN
GFR NON-AFRICAN AMERICAN: 40 ML/MIN
GFR SERPL CREATININE-BSD FRML MDRD: ABNORMAL ML/MIN/{1.73_M2}
GLUCOSE BLD-MCNC: 101 MG/DL (ref 70–99)
GLUCOSE BLD-MCNC: 103 MG/DL (ref 70–99)
HCT VFR BLD CALC: 42 % (ref 36–46)
HDLC SERPL-MCNC: 24 MG/DL
HEMOGLOBIN: 14.1 G/DL (ref 12–16)
IMMATURE GRANULOCYTES: ABNORMAL %
LDL CHOLESTEROL: 65 MG/DL (ref 0–130)
LYMPHOCYTES # BLD: 26 % (ref 24–44)
MAGNESIUM: 2.2 MG/DL (ref 1.6–2.6)
MCH RBC QN AUTO: 31.2 PG (ref 26–34)
MCHC RBC AUTO-ENTMCNC: 33.4 G/DL (ref 31–37)
MCV RBC AUTO: 93.2 FL (ref 80–100)
MONOCYTES # BLD: 11 % (ref 1–7)
NRBC AUTOMATED: ABNORMAL PER 100 WBC
PDW BLD-RTO: 14.2 % (ref 11.5–14.9)
PHOSPHORUS: 2.7 MG/DL (ref 2.6–4.5)
PLATELET # BLD: 241 K/UL (ref 150–450)
PLATELET ESTIMATE: ABNORMAL
PMV BLD AUTO: 8.8 FL (ref 6–12)
POTASSIUM SERPL-SCNC: 4.9 MMOL/L (ref 3.7–5.3)
POTASSIUM SERPL-SCNC: 4.9 MMOL/L (ref 3.7–5.3)
RBC # BLD: 4.51 M/UL (ref 4–5.2)
RBC # BLD: ABNORMAL 10*6/UL
SEG NEUTROPHILS: 58 % (ref 36–66)
SEGMENTED NEUTROPHILS ABSOLUTE COUNT: 3.4 K/UL (ref 1.3–9.1)
SODIUM BLD-SCNC: 139 MMOL/L (ref 135–144)
SODIUM BLD-SCNC: 139 MMOL/L (ref 135–144)
TOTAL PROTEIN: 7.9 G/DL (ref 6.4–8.3)
TRIGL SERPL-MCNC: 323 MG/DL
VITAMIN D 25-HYDROXY: 20.5 NG/ML (ref 30–100)
VLDLC SERPL CALC-MCNC: ABNORMAL MG/DL (ref 1–30)
WBC # BLD: 5.9 K/UL (ref 3.5–11)
WBC # BLD: ABNORMAL 10*3/UL

## 2018-03-12 PROCEDURE — 84100 ASSAY OF PHOSPHORUS: CPT

## 2018-03-12 PROCEDURE — 80048 BASIC METABOLIC PNL TOTAL CA: CPT

## 2018-03-12 PROCEDURE — 80053 COMPREHEN METABOLIC PANEL: CPT

## 2018-03-12 PROCEDURE — 82306 VITAMIN D 25 HYDROXY: CPT

## 2018-03-12 PROCEDURE — 80061 LIPID PANEL: CPT

## 2018-03-12 PROCEDURE — 36415 COLL VENOUS BLD VENIPUNCTURE: CPT

## 2018-03-12 PROCEDURE — 85025 COMPLETE CBC W/AUTO DIFF WBC: CPT

## 2018-03-12 PROCEDURE — 83735 ASSAY OF MAGNESIUM: CPT

## 2018-03-20 ENCOUNTER — HOSPITAL ENCOUNTER (OUTPATIENT)
Age: 58
Discharge: HOME | End: 2018-03-22
Payer: MEDICARE

## 2018-03-20 ENCOUNTER — HOSPITAL ENCOUNTER (OUTPATIENT)
Dept: GENERAL RADIOLOGY | Age: 58
Discharge: HOME | End: 2018-03-22
Payer: MEDICARE

## 2018-03-20 DIAGNOSIS — M54.2 NECK PAIN: ICD-10-CM

## 2018-03-20 DIAGNOSIS — L29.9 PRURITUS OF SKIN: ICD-10-CM

## 2018-03-20 PROCEDURE — 72040 X-RAY EXAM NECK SPINE 2-3 VW: CPT

## 2018-04-05 RX ORDER — GLIMEPIRIDE 4 MG/1
4 TABLET ORAL 2 TIMES DAILY
Qty: 180 TABLET | Refills: 1 | Status: SHIPPED | OUTPATIENT
Start: 2018-04-05 | End: 2018-07-02 | Stop reason: SDUPTHER

## 2018-05-03 DIAGNOSIS — E11.9 TYPE 2 DIABETES MELLITUS WITHOUT COMPLICATION, WITHOUT LONG-TERM CURRENT USE OF INSULIN (HCC): ICD-10-CM

## 2018-05-03 RX ORDER — LANCETS 33 GAUGE
EACH MISCELLANEOUS
Qty: 100 EACH | Refills: 1 | Status: SHIPPED | OUTPATIENT
Start: 2018-05-03 | End: 2019-08-21 | Stop reason: SDUPTHER

## 2018-05-03 RX ORDER — FENOFIBRATE 145 MG/1
TABLET, COATED ORAL
Qty: 30 TABLET | Refills: 2 | Status: SHIPPED | OUTPATIENT
Start: 2018-05-03 | End: 2018-07-02

## 2018-05-17 ENCOUNTER — OFFICE VISIT (OUTPATIENT)
Dept: FAMILY MEDICINE CLINIC | Age: 58
End: 2018-05-17
Payer: MEDICARE

## 2018-05-17 VITALS
SYSTOLIC BLOOD PRESSURE: 110 MMHG | HEART RATE: 93 BPM | DIASTOLIC BLOOD PRESSURE: 60 MMHG | OXYGEN SATURATION: 97 % | WEIGHT: 174 LBS | TEMPERATURE: 98.4 F | BODY MASS INDEX: 28.08 KG/M2

## 2018-05-17 DIAGNOSIS — E11.59 TYPE 2 DIABETES MELLITUS WITH OTHER CIRCULATORY COMPLICATION, WITHOUT LONG-TERM CURRENT USE OF INSULIN (HCC): Primary | ICD-10-CM

## 2018-05-17 DIAGNOSIS — I10 ESSENTIAL HYPERTENSION: ICD-10-CM

## 2018-05-17 DIAGNOSIS — Z12.31 SCREENING MAMMOGRAM, ENCOUNTER FOR: ICD-10-CM

## 2018-05-17 DIAGNOSIS — E78.5 HYPERLIPIDEMIA, UNSPECIFIED HYPERLIPIDEMIA TYPE: ICD-10-CM

## 2018-05-17 PROBLEM — E11.9 DIABETES MELLITUS (HCC): Status: ACTIVE | Noted: 2018-05-17

## 2018-05-17 LAB — HBA1C MFR BLD: 6.3 %

## 2018-05-17 PROCEDURE — 3044F HG A1C LEVEL LT 7.0%: CPT | Performed by: FAMILY MEDICINE

## 2018-05-17 PROCEDURE — 2022F DILAT RTA XM EVC RTNOPTHY: CPT | Performed by: FAMILY MEDICINE

## 2018-05-17 PROCEDURE — 83036 HEMOGLOBIN GLYCOSYLATED A1C: CPT | Performed by: FAMILY MEDICINE

## 2018-05-17 PROCEDURE — 3017F COLORECTAL CA SCREEN DOC REV: CPT | Performed by: FAMILY MEDICINE

## 2018-05-17 PROCEDURE — 99214 OFFICE O/P EST MOD 30 MIN: CPT | Performed by: FAMILY MEDICINE

## 2018-05-17 PROCEDURE — G8427 DOCREV CUR MEDS BY ELIG CLIN: HCPCS | Performed by: FAMILY MEDICINE

## 2018-05-17 PROCEDURE — 4004F PT TOBACCO SCREEN RCVD TLK: CPT | Performed by: FAMILY MEDICINE

## 2018-05-17 PROCEDURE — G8417 CALC BMI ABV UP PARAM F/U: HCPCS | Performed by: FAMILY MEDICINE

## 2018-05-17 ASSESSMENT — ENCOUNTER SYMPTOMS
SORE THROAT: 0
SHORTNESS OF BREATH: 0
NAUSEA: 0
ABDOMINAL PAIN: 0

## 2018-05-22 ENCOUNTER — HOSPITAL ENCOUNTER (OUTPATIENT)
Dept: WOMENS IMAGING | Age: 58
Discharge: HOME | End: 2018-05-24
Payer: MEDICARE

## 2018-05-22 DIAGNOSIS — Z12.31 SCREENING MAMMOGRAM, ENCOUNTER FOR: ICD-10-CM

## 2018-05-22 PROCEDURE — 77067 SCR MAMMO BI INCL CAD: CPT

## 2018-07-02 PROBLEM — Z91.51 HISTORY OF SUICIDE ATTEMPT: Status: ACTIVE | Noted: 2018-07-02

## 2018-07-02 PROBLEM — F32.A DEPRESSION: Status: ACTIVE | Noted: 2018-07-02

## 2018-07-14 ENCOUNTER — HOSPITAL ENCOUNTER (OUTPATIENT)
Dept: VASCULAR LAB | Age: 58
Discharge: HOME | End: 2018-07-14
Payer: MEDICARE

## 2018-07-14 DIAGNOSIS — I65.21 STENOSIS OF RIGHT CAROTID ARTERY: ICD-10-CM

## 2018-07-14 PROCEDURE — 93880 EXTRACRANIAL BILAT STUDY: CPT

## 2018-07-16 ENCOUNTER — INITIAL CONSULT (OUTPATIENT)
Dept: VASCULAR SURGERY | Age: 58
End: 2018-07-16
Payer: MEDICARE

## 2018-07-16 VITALS
WEIGHT: 171 LBS | DIASTOLIC BLOOD PRESSURE: 74 MMHG | BODY MASS INDEX: 27.48 KG/M2 | HEART RATE: 103 BPM | HEIGHT: 66 IN | RESPIRATION RATE: 20 BRPM | SYSTOLIC BLOOD PRESSURE: 142 MMHG | OXYGEN SATURATION: 96 %

## 2018-07-16 DIAGNOSIS — I73.9 PAD (PERIPHERAL ARTERY DISEASE) (HCC): ICD-10-CM

## 2018-07-16 DIAGNOSIS — I71.40 AAA (ABDOMINAL AORTIC ANEURYSM) WITHOUT RUPTURE: Primary | ICD-10-CM

## 2018-07-16 PROCEDURE — 99204 OFFICE O/P NEW MOD 45 MIN: CPT | Performed by: SURGERY

## 2018-07-16 PROCEDURE — G8427 DOCREV CUR MEDS BY ELIG CLIN: HCPCS | Performed by: SURGERY

## 2018-07-16 PROCEDURE — 3017F COLORECTAL CA SCREEN DOC REV: CPT | Performed by: SURGERY

## 2018-07-16 PROCEDURE — G8417 CALC BMI ABV UP PARAM F/U: HCPCS | Performed by: SURGERY

## 2018-07-16 RX ORDER — DOXYCYCLINE HYCLATE 50 MG/1
50 CAPSULE ORAL 2 TIMES DAILY
COMMUNITY
End: 2018-08-20 | Stop reason: ALTCHOICE

## 2018-07-16 ASSESSMENT — ENCOUNTER SYMPTOMS
COLOR CHANGE: 0
CHEST TIGHTNESS: 0
SHORTNESS OF BREATH: 0
ALLERGIC/IMMUNOLOGIC NEGATIVE: 1
ABDOMINAL PAIN: 0

## 2018-07-16 NOTE — PROGRESS NOTES
Division of Vascular Surgery        New Consult      Physician Requesting Consult:  STLELA Guzman - CNP    Reason for Consult:   AAA, PAD    Chief Complaint:      numbness and pain in my legs when I walk, dizziness    History of Present Illness:      Khushi Us is a 59 y.o. woman who presents with numbness that begins in her feet and that travels up her legs into her hips and then she gets a sensation of dizziness/lightheadiness. This occurs when she walks or has been on her feet for prolonged periods. She also develops cramps in her calves when walking longer distances, relieved by rest.    She has had previous vascular work done by surgeons in the community, she is not exactly sure what was done and we are unable to track down the records. She has not followed up with them for several years and has not had any recent imaging done. Review of her CT in 2013 shows an aorto-iliac endograft with occlusion of the left limb due to compression and a fem-fem bypass graft. She denies any prior CVA/TIA but a few years ago had droopiness of her left side of her face and was told she had bell's palsy. MRI had revealed microvascular disease, but no major stroke. She does smoke about a pack a day.     Medical History:     Past Medical History:   Diagnosis Date    AA (aortic aneurysm) (HCC)     MILD SUPRA-RENAL    Anxiety     Blood clot in vein     Bronchitis     Cervical cancer (HCC)     CKD (chronic kidney disease) stage 3, GFR 30-59 ml/min     Depression     Hx of blood clots     Hyperlipidemia     Hyperlipidemia     Hypertension     Nephrolith     Peripheral vascular disease ([address]     Prediabetes     Suicide attempt     2012 x2 with pills       Surgical History:     Past Surgical History:   Procedure Laterality Date    ANGIOPLASTY      RT. FEMORAL ARTERY/BILATERAL ILIAC ARTERY    CHOLECYSTECTOMY      EMBOLECTOMY  Oct 16 2013     [facility] HYSTERECTOMY  1995

## 2018-07-23 ENCOUNTER — TELEPHONE (OUTPATIENT)
Dept: VASCULAR SURGERY | Age: 58
End: 2018-07-23

## 2018-08-06 ENCOUNTER — HOSPITAL ENCOUNTER (OUTPATIENT)
Dept: CT IMAGING | Age: 58
Discharge: HOME | End: 2018-08-08
Payer: MEDICARE

## 2018-08-06 DIAGNOSIS — I71.40 AAA (ABDOMINAL AORTIC ANEURYSM) WITHOUT RUPTURE: ICD-10-CM

## 2018-08-06 DIAGNOSIS — I73.9 PAD (PERIPHERAL ARTERY DISEASE) (HCC): ICD-10-CM

## 2018-08-06 LAB
BUN BLDV-MCNC: 37 MG/DL (ref 8–23)
CREAT SERPL-MCNC: 1.5 MG/DL (ref 0.5–0.9)
GFR AFRICAN AMERICAN: 43 ML/MIN
GFR NON-AFRICAN AMERICAN: 35 ML/MIN
GFR SERPL CREATININE-BSD FRML MDRD: ABNORMAL ML/MIN/{1.73_M2}
GFR SERPL CREATININE-BSD FRML MDRD: ABNORMAL ML/MIN/{1.73_M2}

## 2018-08-06 PROCEDURE — 84520 ASSAY OF UREA NITROGEN: CPT

## 2018-08-06 PROCEDURE — 2580000003 HC RX 258: Performed by: SURGERY

## 2018-08-06 PROCEDURE — 75635 CT ANGIO ABDOMINAL ARTERIES: CPT

## 2018-08-06 PROCEDURE — 6360000004 HC RX CONTRAST MEDICATION: Performed by: SURGERY

## 2018-08-06 PROCEDURE — 82565 ASSAY OF CREATININE: CPT

## 2018-08-06 PROCEDURE — 36415 COLL VENOUS BLD VENIPUNCTURE: CPT

## 2018-08-06 RX ORDER — 0.9 % SODIUM CHLORIDE 0.9 %
80 INTRAVENOUS SOLUTION INTRAVENOUS ONCE
Status: COMPLETED | OUTPATIENT
Start: 2018-08-06 | End: 2018-08-06

## 2018-08-06 RX ADMIN — SODIUM CHLORIDE 80 ML: 9 INJECTION, SOLUTION INTRAVENOUS at 08:58

## 2018-08-06 RX ADMIN — IOPAMIDOL 75 ML: 755 INJECTION, SOLUTION INTRAVENOUS at 08:59

## 2018-08-10 ENCOUNTER — HOSPITAL ENCOUNTER (OUTPATIENT)
Age: 58
Discharge: HOME | End: 2018-08-10
Payer: MEDICARE

## 2018-08-10 DIAGNOSIS — N18.30 CKD (CHRONIC KIDNEY DISEASE) STAGE 3, GFR 30-59 ML/MIN (HCC): ICD-10-CM

## 2018-08-10 DIAGNOSIS — N18.30 BENIGN HYPERTENSION WITH CKD (CHRONIC KIDNEY DISEASE) STAGE III (HCC): ICD-10-CM

## 2018-08-10 DIAGNOSIS — I12.9 BENIGN HYPERTENSION WITH CKD (CHRONIC KIDNEY DISEASE) STAGE III (HCC): ICD-10-CM

## 2018-08-10 DIAGNOSIS — N20.0 NEPHROLITH: ICD-10-CM

## 2018-08-10 LAB
ABSOLUTE EOS #: 0.3 K/UL (ref 0–0.4)
ABSOLUTE IMMATURE GRANULOCYTE: NORMAL K/UL (ref 0–0.3)
ABSOLUTE LYMPH #: 3.2 K/UL (ref 1–4.8)
ABSOLUTE MONO #: 0.6 K/UL (ref 0.1–1.3)
ANION GAP SERPL CALCULATED.3IONS-SCNC: 14 MMOL/L (ref 9–17)
BASOPHILS # BLD: 1 % (ref 0–2)
BASOPHILS ABSOLUTE: 0.1 K/UL (ref 0–0.2)
BUN BLDV-MCNC: 26 MG/DL (ref 8–23)
BUN/CREAT BLD: ABNORMAL (ref 9–20)
CALCIUM SERPL-MCNC: 9.7 MG/DL (ref 8.6–10.4)
CHLORIDE BLD-SCNC: 101 MMOL/L (ref 98–107)
CO2: 21 MMOL/L (ref 20–31)
CREAT SERPL-MCNC: 1.24 MG/DL (ref 0.5–0.9)
DIFFERENTIAL TYPE: NORMAL
EOSINOPHILS RELATIVE PERCENT: 3 % (ref 0–4)
GFR AFRICAN AMERICAN: 53 ML/MIN
GFR NON-AFRICAN AMERICAN: 44 ML/MIN
GFR SERPL CREATININE-BSD FRML MDRD: ABNORMAL ML/MIN/{1.73_M2}
GFR SERPL CREATININE-BSD FRML MDRD: ABNORMAL ML/MIN/{1.73_M2}
GLUCOSE BLD-MCNC: 168 MG/DL (ref 70–99)
HCT VFR BLD CALC: 38.8 % (ref 36–46)
HEMOGLOBIN: 12.9 G/DL (ref 12–16)
IMMATURE GRANULOCYTES: NORMAL %
LYMPHOCYTES # BLD: 33 % (ref 24–44)
MAGNESIUM: 1.8 MG/DL (ref 1.6–2.6)
MCH RBC QN AUTO: 31.1 PG (ref 26–34)
MCHC RBC AUTO-ENTMCNC: 33.2 G/DL (ref 31–37)
MCV RBC AUTO: 93.8 FL (ref 80–100)
MONOCYTES # BLD: 7 % (ref 1–7)
NRBC AUTOMATED: NORMAL PER 100 WBC
PDW BLD-RTO: 14.5 % (ref 11.5–14.9)
PHOSPHORUS: 3.9 MG/DL (ref 2.6–4.5)
PLATELET # BLD: 239 K/UL (ref 150–450)
PLATELET ESTIMATE: NORMAL
PMV BLD AUTO: 8.4 FL (ref 6–12)
POTASSIUM SERPL-SCNC: 4.9 MMOL/L (ref 3.7–5.3)
RBC # BLD: 4.13 M/UL (ref 4–5.2)
RBC # BLD: NORMAL 10*6/UL
SEG NEUTROPHILS: 56 % (ref 36–66)
SEGMENTED NEUTROPHILS ABSOLUTE COUNT: 5.4 K/UL (ref 1.3–9.1)
SODIUM BLD-SCNC: 136 MMOL/L (ref 135–144)
WBC # BLD: 9.6 K/UL (ref 3.5–11)
WBC # BLD: NORMAL 10*3/UL

## 2018-08-10 PROCEDURE — 83735 ASSAY OF MAGNESIUM: CPT

## 2018-08-10 PROCEDURE — 85025 COMPLETE CBC W/AUTO DIFF WBC: CPT

## 2018-08-10 PROCEDURE — 80048 BASIC METABOLIC PNL TOTAL CA: CPT

## 2018-08-10 PROCEDURE — 36415 COLL VENOUS BLD VENIPUNCTURE: CPT

## 2018-08-10 PROCEDURE — 84100 ASSAY OF PHOSPHORUS: CPT

## 2018-08-20 ENCOUNTER — OFFICE VISIT (OUTPATIENT)
Dept: VASCULAR SURGERY | Age: 58
End: 2018-08-20
Payer: MEDICARE

## 2018-08-20 VITALS
HEART RATE: 91 BPM | RESPIRATION RATE: 20 BRPM | SYSTOLIC BLOOD PRESSURE: 118 MMHG | WEIGHT: 169.97 LBS | DIASTOLIC BLOOD PRESSURE: 58 MMHG | OXYGEN SATURATION: 96 % | HEIGHT: 66 IN | BODY MASS INDEX: 27.32 KG/M2

## 2018-08-20 DIAGNOSIS — I73.9 CLAUDICATION IN PERIPHERAL VASCULAR DISEASE (HCC): Primary | ICD-10-CM

## 2018-08-20 DIAGNOSIS — I71.40 AAA (ABDOMINAL AORTIC ANEURYSM) WITHOUT RUPTURE: ICD-10-CM

## 2018-08-20 PROCEDURE — G8598 ASA/ANTIPLAT THER USED: HCPCS | Performed by: SURGERY

## 2018-08-20 PROCEDURE — G8417 CALC BMI ABV UP PARAM F/U: HCPCS | Performed by: SURGERY

## 2018-08-20 PROCEDURE — G8427 DOCREV CUR MEDS BY ELIG CLIN: HCPCS | Performed by: SURGERY

## 2018-08-20 PROCEDURE — 99213 OFFICE O/P EST LOW 20 MIN: CPT | Performed by: SURGERY

## 2018-08-20 PROCEDURE — 3017F COLORECTAL CA SCREEN DOC REV: CPT | Performed by: SURGERY

## 2018-08-20 PROCEDURE — 4004F PT TOBACCO SCREEN RCVD TLK: CPT | Performed by: SURGERY

## 2018-08-20 RX ORDER — ACETAMINOPHEN 160 MG
2000 TABLET,DISINTEGRATING ORAL
COMMUNITY
End: 2019-04-18

## 2018-08-20 ASSESSMENT — ENCOUNTER SYMPTOMS
ABDOMINAL PAIN: 0
BACK PAIN: 1
SHORTNESS OF BREATH: 0
ALLERGIC/IMMUNOLOGIC NEGATIVE: 1

## 2018-08-20 NOTE — PROGRESS NOTES
Division of Vascular Surgery        Imaging Follow Up    AAA, PAD    History of Present Illness:      Isiah Hernandez is a 59 y.o. woman presents for follow up after undergoing CTA of her aorta with runoff to evaluate her anatomy after undergoing multiple procedures for her AAA. It revealed an aortic endograft to exclude her aneurysm, the left limb appears to be occluded and she has a right to left femoral artery bypass graft with narrowing at its proximal anastomosis. She continues to have discomfort on her left side with ambulation, the pain initiates in her back and then goes down. She continues to smoke and this is something we had discussed before. She has not done much of the exercise regimen I asked her to do either. She denies symptoms to suggest ischemic rest pain. Review of Systems:     Review of Systems   Constitutional: Negative for chills and fever. Respiratory: Negative for shortness of breath. Cardiovascular: Negative for chest pain and leg swelling. Gastrointestinal: Negative for abdominal pain. Musculoskeletal: Positive for back pain. Skin: Negative for wound. Allergic/Immunologic: Negative. Neurological: Negative for weakness. Hematological: Negative. Physical Exam:     Vitals:  BP (!) 118/58 (Site: Left Arm, Position: Sitting, Cuff Size: Medium Adult)   Pulse 91   Resp 20   Ht 5' 5.98\" (1.676 m)   Wt 169 lb 15.6 oz (77.1 kg)   LMP 01/01/1996   SpO2 96%   BMI 27.45 kg/m²     Physical Exam   Constitutional: She is oriented to person, place, and time. She appears well-developed and well-nourished. HENT:   Mouth/Throat: Dental caries present. Cardiovascular:   Pulses:       Radial pulses are 2+ on the right side, and 2+ on the left side. Femoral pulses are 1+ on the right side, and 1+ on the left side. Popliteal pulses are 0 on the right side, and 0 on the left side.         Dorsalis pedis pulses are 0 on the right side, and 0 on the left side.        Posterior tibial pulses are 0 on the right side, and 0 on the left side. Pulmonary/Chest: Effort normal. No respiratory distress. Abdominal: Soft. Normal appearance. Feet:   Right Foot:   Skin Integrity: Negative for ulcer or skin breakdown. Left Foot:   Skin Integrity: Negative for ulcer or skin breakdown. Neurological: She is alert and oriented to person, place, and time. She has normal strength. No sensory deficit. Skin: Skin is warm and intact. Capillary refill takes 2 to 3 seconds. Psychiatric: She has a normal mood and affect. Her speech is normal and behavior is normal.     Imaging/Labs:     CT reviewed, aortic endograft with excluded AAA, occluded left iliac limb, patent Fem-Fem bypass with narrowings at anastomosis. Assessment and Plan:     AAA, PAD  · Long discussion about continued aggressive medical management with antiplatelet and statin therapy, DM control with goal of Hgb A1c <7%, BP control and walking regimen  · Brisk walking 5 minutes a day, walk thru the pain for 30 seconds to a minute. After resting complete total exercise time for that day. Increase every week by 5 minutes, goal is to get to 20-30 minutes a day. · Smoking cessation is a must, it will prevent things from getting worse and any intervention she undergoes have a better chance of staying open if she quits smoking. · Will have her follow up in 6 weeks, sooner if symptoms get worse  · She would like to try non-operative approach first  · We did discuss percutaneous attempts at opening up the narrowing in her graft, and still have it as an option in the future. Electronically signed by Gila Coker MD on 8/20/18 at [address] [place] [address] Dr: [phone]  C: [phone]  Email: [email or web address]

## 2018-10-15 ENCOUNTER — OFFICE VISIT (OUTPATIENT)
Dept: VASCULAR SURGERY | Age: 58
End: 2018-10-15
Payer: MEDICARE

## 2018-10-15 VITALS
DIASTOLIC BLOOD PRESSURE: 60 MMHG | HEIGHT: 66 IN | RESPIRATION RATE: 18 BRPM | BODY MASS INDEX: 27 KG/M2 | SYSTOLIC BLOOD PRESSURE: 112 MMHG | WEIGHT: 168 LBS

## 2018-10-15 DIAGNOSIS — I73.9 PAD (PERIPHERAL ARTERY DISEASE) (HCC): ICD-10-CM

## 2018-10-15 DIAGNOSIS — I71.40 AAA (ABDOMINAL AORTIC ANEURYSM) WITHOUT RUPTURE: Primary | ICD-10-CM

## 2018-10-15 PROCEDURE — G8427 DOCREV CUR MEDS BY ELIG CLIN: HCPCS | Performed by: SURGERY

## 2018-10-15 PROCEDURE — 3017F COLORECTAL CA SCREEN DOC REV: CPT | Performed by: SURGERY

## 2018-10-15 PROCEDURE — G8484 FLU IMMUNIZE NO ADMIN: HCPCS | Performed by: SURGERY

## 2018-10-15 PROCEDURE — 99212 OFFICE O/P EST SF 10 MIN: CPT | Performed by: SURGERY

## 2018-10-15 PROCEDURE — G8598 ASA/ANTIPLAT THER USED: HCPCS | Performed by: SURGERY

## 2018-10-15 PROCEDURE — G8417 CALC BMI ABV UP PARAM F/U: HCPCS | Performed by: SURGERY

## 2018-10-15 PROCEDURE — 4004F PT TOBACCO SCREEN RCVD TLK: CPT | Performed by: SURGERY

## 2018-10-15 RX ORDER — DOCUSATE SODIUM 100 MG/1
100 CAPSULE, LIQUID FILLED ORAL EVERY MORNING
COMMUNITY
End: 2019-01-17

## 2018-10-16 PROBLEM — F32.A DEPRESSION: Status: RESOLVED | Noted: 2018-07-02 | Resolved: 2018-10-16

## 2018-10-16 PROBLEM — S98.132A AMPUTATION OF FIFTH TOE, LEFT, TRAUMATIC (HCC): Status: RESOLVED | Noted: 2017-02-07 | Resolved: 2018-10-16

## 2018-10-16 PROBLEM — F32.4 MAJOR DEPRESSIVE DISORDER IN PARTIAL REMISSION (HCC): Status: ACTIVE | Noted: 2018-10-16

## 2018-10-16 PROBLEM — Z89.429 HISTORY OF AMPUTATION OF TOE (HCC): Status: ACTIVE | Noted: 2018-10-16

## 2018-10-20 ENCOUNTER — HOSPITAL ENCOUNTER (OUTPATIENT)
Age: 58
Discharge: HOME | End: 2018-10-20
Payer: MEDICARE

## 2018-10-20 DIAGNOSIS — E11.59 TYPE 2 DIABETES MELLITUS WITH OTHER CIRCULATORY COMPLICATION, WITHOUT LONG-TERM CURRENT USE OF INSULIN (HCC): ICD-10-CM

## 2018-10-20 LAB
ALBUMIN SERPL-MCNC: 4 G/DL (ref 3.5–5.2)
ALBUMIN/GLOBULIN RATIO: ABNORMAL (ref 1–2.5)
ALP BLD-CCNC: 98 U/L (ref 35–104)
ALT SERPL-CCNC: 19 U/L (ref 5–33)
ANION GAP SERPL CALCULATED.3IONS-SCNC: 11 MMOL/L (ref 9–17)
AST SERPL-CCNC: 20 U/L
BILIRUB SERPL-MCNC: 0.18 MG/DL (ref 0.3–1.2)
BUN BLDV-MCNC: 27 MG/DL (ref 8–23)
BUN/CREAT BLD: ABNORMAL (ref 9–20)
CALCIUM SERPL-MCNC: 9.4 MG/DL (ref 8.6–10.4)
CHLORIDE BLD-SCNC: 101 MMOL/L (ref 98–107)
CHOLESTEROL/HDL RATIO: 3.9
CHOLESTEROL: 118 MG/DL
CO2: 25 MMOL/L (ref 20–31)
CREAT SERPL-MCNC: 1.35 MG/DL (ref 0.5–0.9)
GFR AFRICAN AMERICAN: 48 ML/MIN
GFR NON-AFRICAN AMERICAN: 40 ML/MIN
GFR SERPL CREATININE-BSD FRML MDRD: ABNORMAL ML/MIN/{1.73_M2}
GFR SERPL CREATININE-BSD FRML MDRD: ABNORMAL ML/MIN/{1.73_M2}
GLUCOSE BLD-MCNC: 167 MG/DL (ref 70–99)
HDLC SERPL-MCNC: 30 MG/DL
LDL CHOLESTEROL: 22 MG/DL (ref 0–130)
POTASSIUM SERPL-SCNC: 4.9 MMOL/L (ref 3.7–5.3)
SODIUM BLD-SCNC: 137 MMOL/L (ref 135–144)
TOTAL PROTEIN: 7.6 G/DL (ref 6.4–8.3)
TRIGL SERPL-MCNC: 328 MG/DL
VLDLC SERPL CALC-MCNC: ABNORMAL MG/DL (ref 1–30)

## 2018-10-20 PROCEDURE — 80053 COMPREHEN METABOLIC PANEL: CPT

## 2018-10-20 PROCEDURE — 80061 LIPID PANEL: CPT

## 2018-10-20 PROCEDURE — 36415 COLL VENOUS BLD VENIPUNCTURE: CPT

## 2019-01-17 PROBLEM — Z89.429 HISTORY OF AMPUTATION OF TOE (HCC): Status: RESOLVED | Noted: 2018-10-16 | Resolved: 2019-01-17

## 2019-01-17 PROBLEM — N18.30 CHRONIC KIDNEY DISEASE, STAGE III (MODERATE) (HCC): Status: ACTIVE | Noted: 2019-01-17

## 2019-01-17 PROBLEM — Z89.9 HISTORY OF AMPUTATION: Status: ACTIVE | Noted: 2019-01-17

## 2019-01-17 PROBLEM — N28.9 NEPHROPATHY: Status: ACTIVE | Noted: 2019-01-17

## 2019-01-17 PROBLEM — R09.89 POOR CIRCULATION: Status: ACTIVE | Noted: 2019-01-17

## 2019-01-28 ENCOUNTER — HOSPITAL ENCOUNTER (OUTPATIENT)
Age: 59
Discharge: HOME | End: 2019-01-28
Payer: MEDICARE

## 2019-01-28 DIAGNOSIS — I12.9 BENIGN HYPERTENSION WITH CKD (CHRONIC KIDNEY DISEASE) STAGE III (HCC): ICD-10-CM

## 2019-01-28 DIAGNOSIS — E13.22 SECONDARY DIABETES MELLITUS WITH STAGE 3 CHRONIC KIDNEY DISEASE (HCC): ICD-10-CM

## 2019-01-28 DIAGNOSIS — N20.0 NEPHROLITH: ICD-10-CM

## 2019-01-28 DIAGNOSIS — N18.30 CKD (CHRONIC KIDNEY DISEASE) STAGE 3, GFR 30-59 ML/MIN (HCC): ICD-10-CM

## 2019-01-28 DIAGNOSIS — N18.30 SECONDARY DIABETES MELLITUS WITH STAGE 3 CHRONIC KIDNEY DISEASE (HCC): ICD-10-CM

## 2019-01-28 DIAGNOSIS — E11.59 TYPE 2 DIABETES MELLITUS WITH OTHER CIRCULATORY COMPLICATION, WITHOUT LONG-TERM CURRENT USE OF INSULIN (HCC): ICD-10-CM

## 2019-01-28 DIAGNOSIS — N18.30 BENIGN HYPERTENSION WITH CKD (CHRONIC KIDNEY DISEASE) STAGE III (HCC): ICD-10-CM

## 2019-01-28 LAB
ABSOLUTE EOS #: 0.2 K/UL (ref 0–0.4)
ABSOLUTE EOS #: 0.2 K/UL (ref 0–0.4)
ABSOLUTE IMMATURE GRANULOCYTE: ABNORMAL K/UL (ref 0–0.3)
ABSOLUTE IMMATURE GRANULOCYTE: ABNORMAL K/UL (ref 0–0.3)
ABSOLUTE LYMPH #: 2.5 K/UL (ref 1–4.8)
ABSOLUTE LYMPH #: 2.5 K/UL (ref 1–4.8)
ABSOLUTE MONO #: 0.7 K/UL (ref 0.1–1.3)
ABSOLUTE MONO #: 0.7 K/UL (ref 0.1–1.3)
ALBUMIN SERPL-MCNC: 4 G/DL (ref 3.5–5.2)
ALBUMIN/GLOBULIN RATIO: ABNORMAL (ref 1–2.5)
ALP BLD-CCNC: 57 U/L (ref 35–104)
ALT SERPL-CCNC: 15 U/L (ref 5–33)
ANION GAP SERPL CALCULATED.3IONS-SCNC: 10 MMOL/L (ref 9–17)
ANION GAP SERPL CALCULATED.3IONS-SCNC: 10 MMOL/L (ref 9–17)
AST SERPL-CCNC: 16 U/L
BASOPHILS # BLD: 1 % (ref 0–2)
BASOPHILS # BLD: 1 % (ref 0–2)
BASOPHILS ABSOLUTE: 0.1 K/UL (ref 0–0.2)
BASOPHILS ABSOLUTE: 0.1 K/UL (ref 0–0.2)
BILIRUB SERPL-MCNC: <0.15 MG/DL (ref 0.3–1.2)
BUN BLDV-MCNC: 33 MG/DL (ref 8–23)
BUN BLDV-MCNC: 33 MG/DL (ref 8–23)
BUN/CREAT BLD: ABNORMAL (ref 9–20)
BUN/CREAT BLD: ABNORMAL (ref 9–20)
CALCIUM SERPL-MCNC: 10 MG/DL (ref 8.6–10.4)
CALCIUM SERPL-MCNC: 10 MG/DL (ref 8.6–10.4)
CHLORIDE BLD-SCNC: 100 MMOL/L (ref 98–107)
CHLORIDE BLD-SCNC: 100 MMOL/L (ref 98–107)
CHOLESTEROL/HDL RATIO: 7.8
CHOLESTEROL: 226 MG/DL
CO2: 26 MMOL/L (ref 20–31)
CO2: 26 MMOL/L (ref 20–31)
CREAT SERPL-MCNC: 1.58 MG/DL (ref 0.5–0.9)
CREAT SERPL-MCNC: 1.58 MG/DL (ref 0.5–0.9)
CREATININE URINE: 88.9 MG/DL (ref 28–217)
DIFFERENTIAL TYPE: ABNORMAL
DIFFERENTIAL TYPE: ABNORMAL
EOSINOPHILS RELATIVE PERCENT: 3 % (ref 0–4)
EOSINOPHILS RELATIVE PERCENT: 3 % (ref 0–4)
ESTIMATED AVERAGE GLUCOSE: 131 MG/DL
GFR AFRICAN AMERICAN: 40 ML/MIN
GFR AFRICAN AMERICAN: 40 ML/MIN
GFR NON-AFRICAN AMERICAN: 33 ML/MIN
GFR NON-AFRICAN AMERICAN: 33 ML/MIN
GFR SERPL CREATININE-BSD FRML MDRD: ABNORMAL ML/MIN/{1.73_M2}
GLUCOSE BLD-MCNC: 141 MG/DL (ref 70–99)
GLUCOSE BLD-MCNC: 141 MG/DL (ref 70–99)
HBA1C MFR BLD: 6.2 % (ref 4–6)
HCT VFR BLD CALC: 41.8 % (ref 36–46)
HCT VFR BLD CALC: 41.8 % (ref 36–46)
HDLC SERPL-MCNC: 29 MG/DL
HEMOGLOBIN: 13.8 G/DL (ref 12–16)
HEMOGLOBIN: 13.8 G/DL (ref 12–16)
IMMATURE GRANULOCYTES: ABNORMAL %
IMMATURE GRANULOCYTES: ABNORMAL %
LDL CHOLESTEROL: 128 MG/DL (ref 0–130)
LYMPHOCYTES # BLD: 27 % (ref 24–44)
LYMPHOCYTES # BLD: 27 % (ref 24–44)
MAGNESIUM: 2 MG/DL (ref 1.6–2.6)
MCH RBC QN AUTO: 30.5 PG (ref 26–34)
MCH RBC QN AUTO: 30.5 PG (ref 26–34)
MCHC RBC AUTO-ENTMCNC: 33.1 G/DL (ref 31–37)
MCHC RBC AUTO-ENTMCNC: 33.1 G/DL (ref 31–37)
MCV RBC AUTO: 92.4 FL (ref 80–100)
MCV RBC AUTO: 92.4 FL (ref 80–100)
MICROALBUMIN/CREAT 24H UR: <12 MG/L
MICROALBUMIN/CREAT UR-RTO: NORMAL MCG/MG CREAT
MONOCYTES # BLD: 8 % (ref 1–7)
MONOCYTES # BLD: 8 % (ref 1–7)
NRBC AUTOMATED: ABNORMAL PER 100 WBC
NRBC AUTOMATED: ABNORMAL PER 100 WBC
PDW BLD-RTO: 14.1 % (ref 11.5–14.9)
PDW BLD-RTO: 14.1 % (ref 11.5–14.9)
PHOSPHORUS: 3.8 MG/DL (ref 2.6–4.5)
PLATELET # BLD: 234 K/UL (ref 150–450)
PLATELET # BLD: 234 K/UL (ref 150–450)
PLATELET ESTIMATE: ABNORMAL
PLATELET ESTIMATE: ABNORMAL
PMV BLD AUTO: 8.8 FL (ref 6–12)
PMV BLD AUTO: 8.8 FL (ref 6–12)
POTASSIUM SERPL-SCNC: 4.8 MMOL/L (ref 3.7–5.3)
POTASSIUM SERPL-SCNC: 4.8 MMOL/L (ref 3.7–5.3)
RBC # BLD: 4.52 M/UL (ref 4–5.2)
RBC # BLD: 4.52 M/UL (ref 4–5.2)
RBC # BLD: ABNORMAL 10*6/UL
RBC # BLD: ABNORMAL 10*6/UL
SEG NEUTROPHILS: 61 % (ref 36–66)
SEG NEUTROPHILS: 61 % (ref 36–66)
SEGMENTED NEUTROPHILS ABSOLUTE COUNT: 5.8 K/UL (ref 1.3–9.1)
SEGMENTED NEUTROPHILS ABSOLUTE COUNT: 5.8 K/UL (ref 1.3–9.1)
SODIUM BLD-SCNC: 136 MMOL/L (ref 135–144)
SODIUM BLD-SCNC: 136 MMOL/L (ref 135–144)
TOTAL PROTEIN: 7.6 G/DL (ref 6.4–8.3)
TRIGL SERPL-MCNC: 343 MG/DL
VLDLC SERPL CALC-MCNC: ABNORMAL MG/DL (ref 1–30)
WBC # BLD: 9.4 K/UL (ref 3.5–11)
WBC # BLD: 9.4 K/UL (ref 3.5–11)
WBC # BLD: ABNORMAL 10*3/UL
WBC # BLD: ABNORMAL 10*3/UL

## 2019-01-28 PROCEDURE — 82043 UR ALBUMIN QUANTITATIVE: CPT

## 2019-01-28 PROCEDURE — 80053 COMPREHEN METABOLIC PANEL: CPT

## 2019-01-28 PROCEDURE — 84100 ASSAY OF PHOSPHORUS: CPT

## 2019-01-28 PROCEDURE — 80061 LIPID PANEL: CPT

## 2019-01-28 PROCEDURE — 85025 COMPLETE CBC W/AUTO DIFF WBC: CPT

## 2019-01-28 PROCEDURE — 36415 COLL VENOUS BLD VENIPUNCTURE: CPT

## 2019-01-28 PROCEDURE — 82570 ASSAY OF URINE CREATININE: CPT

## 2019-01-28 PROCEDURE — 83036 HEMOGLOBIN GLYCOSYLATED A1C: CPT

## 2019-01-28 PROCEDURE — 83735 ASSAY OF MAGNESIUM: CPT

## 2019-01-28 PROCEDURE — 80048 BASIC METABOLIC PNL TOTAL CA: CPT

## 2019-03-12 PROBLEM — N18.30 SECONDARY DIABETES MELLITUS WITH STAGE 3 CHRONIC KIDNEY DISEASE (HCC): Status: ACTIVE | Noted: 2019-03-12

## 2019-03-12 PROBLEM — E13.22 SECONDARY DIABETES MELLITUS WITH STAGE 3 CHRONIC KIDNEY DISEASE (HCC): Status: ACTIVE | Noted: 2019-03-12

## 2019-05-06 ENCOUNTER — HOSPITAL ENCOUNTER (OUTPATIENT)
Dept: VASCULAR LAB | Age: 59
Discharge: HOME | End: 2019-05-06
Payer: MEDICARE

## 2019-05-06 ENCOUNTER — HOSPITAL ENCOUNTER (OUTPATIENT)
Age: 59
Discharge: HOME | End: 2019-05-06
Payer: MEDICARE

## 2019-05-06 DIAGNOSIS — E53.8 B12 DEFICIENCY: ICD-10-CM

## 2019-05-06 DIAGNOSIS — E55.9 VITAMIN D DEFICIENCY: ICD-10-CM

## 2019-05-06 LAB
ABSOLUTE EOS #: 0.2 K/UL (ref 0–0.4)
ABSOLUTE IMMATURE GRANULOCYTE: ABNORMAL K/UL (ref 0–0.3)
ABSOLUTE LYMPH #: 2.1 K/UL (ref 1–4.8)
ABSOLUTE MONO #: 0.7 K/UL (ref 0.1–1.3)
BASOPHILS # BLD: 1 % (ref 0–2)
BASOPHILS ABSOLUTE: 0.1 K/UL (ref 0–0.2)
DIFFERENTIAL TYPE: ABNORMAL
EOSINOPHILS RELATIVE PERCENT: 3 % (ref 0–4)
FOLATE: 11.7 NG/ML
HCT VFR BLD CALC: 42.5 % (ref 36–46)
HEMOGLOBIN: 13.9 G/DL (ref 12–16)
IMMATURE GRANULOCYTES: ABNORMAL %
LYMPHOCYTES # BLD: 24 % (ref 24–44)
MCH RBC QN AUTO: 30.5 PG (ref 26–34)
MCHC RBC AUTO-ENTMCNC: 32.8 G/DL (ref 31–37)
MCV RBC AUTO: 92.9 FL (ref 80–100)
MONOCYTES # BLD: 8 % (ref 1–7)
NRBC AUTOMATED: ABNORMAL PER 100 WBC
PDW BLD-RTO: 14.3 % (ref 11.5–14.9)
PLATELET # BLD: 199 K/UL (ref 150–450)
PLATELET ESTIMATE: ABNORMAL
PMV BLD AUTO: 9.1 FL (ref 6–12)
RBC # BLD: 4.58 M/UL (ref 4–5.2)
RBC # BLD: ABNORMAL 10*6/UL
SEG NEUTROPHILS: 64 % (ref 36–66)
SEGMENTED NEUTROPHILS ABSOLUTE COUNT: 5.6 K/UL (ref 1.3–9.1)
VITAMIN B-12: 444 PG/ML (ref 232–1245)
VITAMIN D 25-HYDROXY: 53.5 NG/ML (ref 30–100)
WBC # BLD: 8.7 K/UL (ref 3.5–11)
WBC # BLD: ABNORMAL 10*3/UL

## 2019-05-06 PROCEDURE — 82306 VITAMIN D 25 HYDROXY: CPT

## 2019-05-06 PROCEDURE — 76706 US ABDL AORTA SCREEN AAA: CPT

## 2019-05-06 PROCEDURE — 36415 COLL VENOUS BLD VENIPUNCTURE: CPT

## 2019-05-06 PROCEDURE — 82607 VITAMIN B-12: CPT

## 2019-05-06 PROCEDURE — 82746 ASSAY OF FOLIC ACID SERUM: CPT

## 2019-05-06 PROCEDURE — 85025 COMPLETE CBC W/AUTO DIFF WBC: CPT

## 2019-05-21 ENCOUNTER — HOSPITAL ENCOUNTER (OUTPATIENT)
Dept: VASCULAR LAB | Age: 59
Discharge: HOME | End: 2019-05-21
Payer: MEDICARE

## 2019-05-31 ENCOUNTER — HOSPITAL ENCOUNTER (OUTPATIENT)
Dept: VASCULAR LAB | Age: 59
Discharge: HOME | End: 2019-05-31
Payer: MEDICARE

## 2019-05-31 DIAGNOSIS — I73.9 PAD (PERIPHERAL ARTERY DISEASE) (HCC): ICD-10-CM

## 2019-05-31 PROCEDURE — 93990 DOPPLER FLOW TESTING: CPT

## 2019-05-31 PROCEDURE — 93971 EXTREMITY STUDY: CPT

## 2019-07-16 ENCOUNTER — HOSPITAL ENCOUNTER (OUTPATIENT)
Dept: WOMENS IMAGING | Age: 59
Discharge: HOME | End: 2019-07-18
Payer: MEDICARE

## 2019-07-16 DIAGNOSIS — Z00.00 WELL ADULT EXAM: ICD-10-CM

## 2019-07-16 DIAGNOSIS — Z12.39 BREAST CANCER SCREENING: ICD-10-CM

## 2019-07-16 PROCEDURE — 77063 BREAST TOMOSYNTHESIS BI: CPT

## 2019-08-05 ENCOUNTER — HOSPITAL ENCOUNTER (OUTPATIENT)
Age: 59
Discharge: HOME | End: 2019-08-05
Payer: MEDICARE

## 2019-08-05 DIAGNOSIS — N18.30 SECONDARY DIABETES MELLITUS WITH STAGE 3 CHRONIC KIDNEY DISEASE (HCC): ICD-10-CM

## 2019-08-05 DIAGNOSIS — E55.9 VITAMIN D DEFICIENCY: ICD-10-CM

## 2019-08-05 DIAGNOSIS — I10 ESSENTIAL HYPERTENSION: ICD-10-CM

## 2019-08-05 DIAGNOSIS — E13.22 SECONDARY DIABETES MELLITUS WITH STAGE 3 CHRONIC KIDNEY DISEASE (HCC): ICD-10-CM

## 2019-08-05 DIAGNOSIS — E53.8 B12 DEFICIENCY: ICD-10-CM

## 2019-08-05 LAB
ABSOLUTE EOS #: 0.3 K/UL (ref 0–0.4)
ABSOLUTE IMMATURE GRANULOCYTE: ABNORMAL K/UL (ref 0–0.3)
ABSOLUTE LYMPH #: 2.6 K/UL (ref 1–4.8)
ABSOLUTE MONO #: 0.8 K/UL (ref 0.1–1.3)
ALBUMIN SERPL-MCNC: 4.2 G/DL (ref 3.5–5.2)
ALBUMIN/GLOBULIN RATIO: ABNORMAL (ref 1–2.5)
ALP BLD-CCNC: 87 U/L (ref 35–104)
ALT SERPL-CCNC: 20 U/L (ref 5–33)
ANION GAP SERPL CALCULATED.3IONS-SCNC: 11 MMOL/L (ref 9–17)
AST SERPL-CCNC: 18 U/L
BASOPHILS # BLD: 1 % (ref 0–2)
BASOPHILS ABSOLUTE: 0.1 K/UL (ref 0–0.2)
BILIRUB SERPL-MCNC: 0.19 MG/DL (ref 0.3–1.2)
BUN BLDV-MCNC: 24 MG/DL (ref 8–23)
BUN/CREAT BLD: ABNORMAL (ref 9–20)
CALCIUM SERPL-MCNC: 9.3 MG/DL (ref 8.6–10.4)
CHLORIDE BLD-SCNC: 105 MMOL/L (ref 98–107)
CHOLESTEROL/HDL RATIO: 6.3
CHOLESTEROL: 182 MG/DL
CO2: 24 MMOL/L (ref 20–31)
CREAT SERPL-MCNC: 1.19 MG/DL (ref 0.5–0.9)
DIFFERENTIAL TYPE: ABNORMAL
EOSINOPHILS RELATIVE PERCENT: 3 % (ref 0–4)
ESTIMATED AVERAGE GLUCOSE: 154 MG/DL
GFR AFRICAN AMERICAN: 56 ML/MIN
GFR NON-AFRICAN AMERICAN: 46 ML/MIN
GFR SERPL CREATININE-BSD FRML MDRD: ABNORMAL ML/MIN/{1.73_M2}
GFR SERPL CREATININE-BSD FRML MDRD: ABNORMAL ML/MIN/{1.73_M2}
GLUCOSE BLD-MCNC: 119 MG/DL (ref 70–99)
HBA1C MFR BLD: 7 % (ref 4–6)
HCT VFR BLD CALC: 40.9 % (ref 36–46)
HDLC SERPL-MCNC: 29 MG/DL
HEMOGLOBIN: 13.8 G/DL (ref 12–16)
IMMATURE GRANULOCYTES: ABNORMAL %
LDL CHOLESTEROL DIRECT: 81 MG/DL
LDL CHOLESTEROL: ABNORMAL MG/DL (ref 0–130)
LYMPHOCYTES # BLD: 27 % (ref 24–44)
MCH RBC QN AUTO: 31 PG (ref 26–34)
MCHC RBC AUTO-ENTMCNC: 33.7 G/DL (ref 31–37)
MCV RBC AUTO: 91.9 FL (ref 80–100)
MONOCYTES # BLD: 8 % (ref 1–7)
NRBC AUTOMATED: ABNORMAL PER 100 WBC
PDW BLD-RTO: 14.5 % (ref 11.5–14.9)
PLATELET # BLD: 210 K/UL (ref 150–450)
PLATELET ESTIMATE: ABNORMAL
PMV BLD AUTO: 8.9 FL (ref 6–12)
POTASSIUM SERPL-SCNC: 4.8 MMOL/L (ref 3.7–5.3)
RBC # BLD: 4.45 M/UL (ref 4–5.2)
RBC # BLD: ABNORMAL 10*6/UL
SEG NEUTROPHILS: 61 % (ref 36–66)
SEGMENTED NEUTROPHILS ABSOLUTE COUNT: 5.9 K/UL (ref 1.3–9.1)
SODIUM BLD-SCNC: 140 MMOL/L (ref 135–144)
TOTAL PROTEIN: 7.8 G/DL (ref 6.4–8.3)
TRIGL SERPL-MCNC: 420 MG/DL
VITAMIN D 25-HYDROXY: 42 NG/ML (ref 30–100)
VLDLC SERPL CALC-MCNC: ABNORMAL MG/DL (ref 1–30)
WBC # BLD: 9.6 K/UL (ref 3.5–11)
WBC # BLD: ABNORMAL 10*3/UL

## 2019-08-05 PROCEDURE — 80053 COMPREHEN METABOLIC PANEL: CPT

## 2019-08-05 PROCEDURE — 85025 COMPLETE CBC W/AUTO DIFF WBC: CPT

## 2019-08-05 PROCEDURE — 36415 COLL VENOUS BLD VENIPUNCTURE: CPT

## 2019-08-05 PROCEDURE — 83036 HEMOGLOBIN GLYCOSYLATED A1C: CPT

## 2019-08-05 PROCEDURE — 82306 VITAMIN D 25 HYDROXY: CPT

## 2019-08-05 PROCEDURE — 83721 ASSAY OF BLOOD LIPOPROTEIN: CPT

## 2019-08-05 PROCEDURE — 80061 LIPID PANEL: CPT

## 2019-08-07 ENCOUNTER — HOSPITAL ENCOUNTER (OUTPATIENT)
Dept: PAIN MANAGEMENT | Age: 59
Discharge: HOME | End: 2019-08-07
Payer: MEDICARE

## 2019-08-07 VITALS
DIASTOLIC BLOOD PRESSURE: 77 MMHG | HEIGHT: 66 IN | SYSTOLIC BLOOD PRESSURE: 127 MMHG | BODY MASS INDEX: 29.09 KG/M2 | HEART RATE: 96 BPM | WEIGHT: 181 LBS | TEMPERATURE: 98.3 F | OXYGEN SATURATION: 98 % | RESPIRATION RATE: 20 BRPM

## 2019-08-07 DIAGNOSIS — M47.816 ARTHROPATHY OF LUMBAR FACET JOINT: ICD-10-CM

## 2019-08-07 DIAGNOSIS — M47.816 SPONDYLOSIS OF LUMBAR REGION WITHOUT MYELOPATHY OR RADICULOPATHY: Primary | ICD-10-CM

## 2019-08-07 DIAGNOSIS — I73.9 PERIPHERAL VASCULAR DISEASE (HCC): ICD-10-CM

## 2019-08-07 DIAGNOSIS — M51.36 DDD (DEGENERATIVE DISC DISEASE), LUMBAR: ICD-10-CM

## 2019-08-07 PROBLEM — I65.29 CAROTID ARTERY STENOSIS: Status: ACTIVE | Noted: 2017-07-03

## 2019-08-07 PROCEDURE — 80307 DRUG TEST PRSMV CHEM ANLYZR: CPT

## 2019-08-07 PROCEDURE — 99215 OFFICE O/P EST HI 40 MIN: CPT | Performed by: PAIN MEDICINE

## 2019-08-07 PROCEDURE — 99215 OFFICE O/P EST HI 40 MIN: CPT

## 2019-08-07 RX ORDER — ATORVASTATIN CALCIUM 20 MG/1
TABLET, FILM COATED ORAL
COMMUNITY
End: 2019-09-17 | Stop reason: SINTOL

## 2019-08-07 RX ORDER — BUPROPION HYDROCHLORIDE 300 MG/1
TABLET ORAL
COMMUNITY
End: 2019-08-07

## 2019-08-07 RX ORDER — CLOPIDOGREL BISULFATE 75 MG/1
TABLET ORAL
COMMUNITY
End: 2019-08-07

## 2019-08-07 RX ORDER — TRAMADOL HYDROCHLORIDE 50 MG/1
50 TABLET ORAL
COMMUNITY
End: 2019-08-07 | Stop reason: CLARIF

## 2019-08-07 RX ORDER — BUPROPION HYDROCHLORIDE 150 MG/1
TABLET ORAL
COMMUNITY
End: 2019-08-07

## 2019-08-07 RX ORDER — TRAMADOL HYDROCHLORIDE 50 MG/1
50 TABLET ORAL EVERY 8 HOURS PRN
Qty: 30 TABLET | Refills: 0 | Status: SHIPPED | OUTPATIENT
Start: 2019-08-07 | End: 2019-08-21

## 2019-08-07 RX ORDER — TRAMADOL HYDROCHLORIDE 50 MG/1
50 TABLET ORAL EVERY 8 HOURS PRN
Qty: 90 TABLET | Refills: 0 | Status: SHIPPED | OUTPATIENT
Start: 2019-08-07 | End: 2019-08-07 | Stop reason: SDUPTHER

## 2019-08-07 RX ORDER — TRAZODONE HYDROCHLORIDE 100 MG/1
TABLET ORAL
COMMUNITY
End: 2019-08-07

## 2019-08-07 RX ORDER — NICOTINE 21 MG/24HR
PATCH, TRANSDERMAL 24 HOURS TRANSDERMAL
COMMUNITY
End: 2019-08-07

## 2019-08-07 RX ORDER — ARIPIPRAZOLE 10 MG/1
5 TABLET ORAL
COMMUNITY
End: 2019-09-23

## 2019-08-07 RX ORDER — LISINOPRIL 5 MG/1
TABLET ORAL
COMMUNITY
End: 2019-08-07

## 2019-08-07 RX ORDER — FLUOXETINE HYDROCHLORIDE 40 MG/1
CAPSULE ORAL
COMMUNITY
End: 2019-08-23

## 2019-08-07 RX ORDER — LISINOPRIL 2.5 MG/1
TABLET ORAL
COMMUNITY
End: 2019-08-07

## 2019-08-07 RX ORDER — KETOROLAC TROMETHAMINE 5 MG/ML
SOLUTION OPHTHALMIC
COMMUNITY
Start: 2019-08-06 | End: 2019-08-23

## 2019-08-07 ASSESSMENT — ENCOUNTER SYMPTOMS
BACK PAIN: 1
EYES NEGATIVE: 1
RESPIRATORY NEGATIVE: 1
GASTROINTESTINAL NEGATIVE: 1

## 2019-08-07 ASSESSMENT — PAIN DESCRIPTION - ONSET: ONSET: ON-GOING

## 2019-08-07 ASSESSMENT — ACTIVITIES OF DAILY LIVING (ADL): EFFECT OF PAIN ON DAILY ACTIVITIES: UNABLE TO WALK SHORT DISTANCES WITHOUT HAVING PAIN

## 2019-08-07 ASSESSMENT — PAIN DESCRIPTION - ORIENTATION: ORIENTATION: RIGHT;LEFT

## 2019-08-07 ASSESSMENT — PAIN - FUNCTIONAL ASSESSMENT: PAIN_FUNCTIONAL_ASSESSMENT: PREVENTS OR INTERFERES SOME ACTIVE ACTIVITIES AND ADLS

## 2019-08-07 ASSESSMENT — PAIN DESCRIPTION - PAIN TYPE: TYPE: CHRONIC PAIN

## 2019-08-07 ASSESSMENT — PAIN DESCRIPTION - LOCATION: LOCATION: BACK;HIP

## 2019-08-07 ASSESSMENT — PAIN SCALES - GENERAL: PAINLEVEL_OUTOF10: 4

## 2019-08-07 ASSESSMENT — PAIN DESCRIPTION - PROGRESSION: CLINICAL_PROGRESSION: GRADUALLY WORSENING

## 2019-08-07 ASSESSMENT — PAIN DESCRIPTION - FREQUENCY: FREQUENCY: CONTINUOUS

## 2019-08-07 NOTE — PROGRESS NOTES
capsule      diclofenac sodium (VOLTAREN) 1 % GEL Voltaren 1 % topical gel   use as directed as needed      ketorolac (ACULAR) 0.5 % ophthalmic solution       atorvastatin (LIPITOR) 20 MG tablet atorvastatin 20 mg tablet      lisinopril (PRINIVIL;ZESTRIL) 10 MG tablet TAKE ONE TABLET BY MOUTH DAILY 90 tablet 0    lidocaine (XYLOCAINE) 5 % ointment Apply topically BID to affected areas 50 g 0    glimepiride (AMARYL) 4 MG tablet TAKE ONE TABLET BY MOUTH AT BREAKFAST AND TAKE ONE TABLET BY MOUTH AT DINNER 180 tablet 0    chlorzoxazone (PARAFON FORTE) 500 MG tablet Take 1 tablet by mouth 2 times daily 60 tablet 1    famotidine (PEPCID) 20 MG tablet Take 1 tablet by mouth nightly 60 tablet 0    metoprolol succinate (TOPROL XL) 25 MG extended release tablet TAKE ONE TABLET BY MOUTH DAILY 90 tablet 0    clopidogrel (PLAVIX) 75 MG tablet TAKE ONE TABLET BY MOUTH DAILY 90 tablet 0    ONETOUCH DELICA LANCETS 86D MISC USE ONE LANCET TO TEST TWICE A  each 1    ONE TOUCH ULTRA TEST strip USE ONE STRIP TO TEST TWICE A  strip 1    aspirin EC 81 MG EC tablet Take 1 tablet by mouth daily 128 tablet 0    FETZIMA 120 MG CP24 ER capsule Take 120 mg by mouth daily       traZODone (DESYREL) 100 MG tablet Take 100 mg by mouth nightly. Indications: 2 tabs nightly       No current facility-administered medications for this encounter. Allergies  Patient has no known allergies. Family History  family history includes Coronary Art Dis in her brother; Diabetes in her brother and mother; Hearing Loss in her brother; Heart Disease in her father; High Blood Pressure in her brother; Other in her mother; Seizures in her brother.     Social History  Social History     Socioeconomic History    Marital status:      Spouse name: None    Number of children: None    Years of education: None    Highest education level: None   Occupational History    Occupation: disability   Social Needs    Financial resource strain: None    Food insecurity:     Worry: None     Inability: None    Transportation needs:     Medical: None     Non-medical: None   Tobacco Use    Smoking status: Current Every Day Smoker     Packs/day: 1.00     Years: 20.00     Pack years: 20.00     Types: Cigarettes    Smokeless tobacco: Never Used   Substance and Sexual Activity    Alcohol use: No     Alcohol/week: 0.0 standard drinks     Comment:   quit 8-30-13    Drug use: No    Sexual activity: Not Currently   Lifestyle    Physical activity:     Days per week: None     Minutes per session: None    Stress: None   Relationships    Social connections:     Talks on phone: None     Gets together: None     Attends Yazdanism service: None     Active member of club or organization: None     Attends meetings of clubs or organizations: None     Relationship status: None    Intimate partner violence:     Fear of current or ex partner: None     Emotionally abused: None     Physically abused: None     Forced sexual activity: None   Other Topics Concern    None   Social History Narrative    None      reports that she does not use drugs. REVIEW OF SYSTEMS:  Review of Systems   Constitutional: Negative. HENT: Negative. Eyes: Negative. Respiratory: Negative. Cardiovascular: Positive for leg swelling. Gastrointestinal: Negative. Endocrine: Negative. Genitourinary: Negative. Musculoskeletal: Positive for back pain, gait problem and myalgias. Neurological: Positive for dizziness, weakness and numbness. GENERAL PHYSICAL EXAM:  Vitals: /77   Pulse 96   Temp 98.3 °F (36.8 °C) (Oral)   Resp 20   Ht 5' 6\" (1.676 m)   Wt 181 lb (82.1 kg)   LMP 01/01/1996   SpO2 98%   BMI 29.21 kg/m² , Body mass index is 29.21 kg/m². Physical Exam   Constitutional: She is oriented to person, place, and time. She appears well-developed and well-nourished. HENT:   Head: Normocephalic.    Eyes: Pupils are equal, round, and with occlusion of the distal anterior tibial artery. 5. No acute findings within the abdomen or pelvis. 6. Mild hepatic steatosis. MRI of the lumbar spine 9/3/2016   FINDINGS:       Sagittal images:  The conus medullaris terminates at T12-L1.  No fracture, subluxation, or prevertebral soft tissue swelling is noted.  Alignment and marrow signal are unremarkable.  The disc spaces are fairly well-maintained.  Slight disc dehydration    and/or desiccation is present at L1-L2.  Mild anterior spondylosis is present in the lower thoracic and upper lumbar regions.       Axial images:       At T12-L1, mild facet changes and minimal disc bulge are present.  The canal and neural foramina are patent.  An incidental small right renal cyst is seen.       At L1 -- L2, mild facet changes are present.  A minimal disc bulge is noted.  The canal and neural foramina are patent.       At L2 -- L3, facet arthropathy is noted.  The disc contour, canal and neural foramina are unremarkable.       At L3 -- L4, mild facet changes are noted.  Minimal disc bulge is seen.  The canal and neural foramina are patent.       At L4 -- L5, facet arthropathy, ligamentous thickening, and a mild diffuse disc protrusion is noted.  The neural foramina are mild to moderately narrowed.  Exiting nerve roots are elevated but not impinged which can lead to positional nerve root    irritation.  Narrowing is more significant on the right side.       At L5 -- S1, mild facet changes are present.  The disc contour, canal and neural foramina are unremarkable.           Impression   Multilevel facet changes.  The patient has neural foraminal narrowing at L4-L5 with elevated nerve roots which can lead to positional nerve root irritation.  No exiting nerve root impingement is noted.       Final report electronically signed by Harley Lancaster M.D. on 9/3/2016     pine    Patient Active Problem List   Diagnosis    Peripheral vascular disease [facility]    CKD (chronic kidney disease) stage 3, GFR 30-59 ml/min ([facility])    AA (aortic aneurysm) ([facility])    Nephrolith    Essential hypertension    Hyperlipidemia    Low back pain potentially associated with radiculopathy    Osteoarthritis of lumbar spine    Diabetes mellitus ([facility])    Depressive disorder    History of suicide attempt    Major depressive disorder in partial remission [facility]    Chronic kidney disease, stage III (moderate) ([facility])    History of amputation    Poor circulation    Nephropathy    Secondary diabetes mellitus with stage 3 chronic kidney disease ([facility]    Carotid artery stenosis    Claudication ([facility]    Suicidal thoughts        ASSESSMENT    Eleanor Carter is a 62 y.o. female with     1. Spondylosis of lumbar region without myelopathy or radiculopathy    2. DDD (degenerative disc disease), lumbar    3. Arthropathy of lumbar facet joint    4. Peripheral vascular disease ([facility]           PLAN  Patient's   [] x-ray    [] CT scan    [x] MRI  Were/was  Reviewed. These findings are consistent with the patient's symptoms and physical examination. [x] Patient's findings on the x-ray were explained to the patient using a bone modal.    Other reports reviewed include    [] Bone scan   [] EMG and nerve conduction studies   [x] Referral reports-  I also discussed with him the following treatment options Including advantages and disadvantages of each:    [x] Physical therapy    [x] Interventional pain treatment    [x] Medication management    [] Surgical options    Patient's OARRS were reviewed. It is acceptable and appears patient is not receiving prescriptions from multiple prescribers. Patient is  forthcoming regarding prescriptions for pain medication in the past  Controlled Substances Monitoring: The following screens were also reviewed  SOAPP- the score is 8.  (Values:cates patient is  <4minimal potential  4-7 Moderate potential  >7 High potential  for drug addiction    Orders Placed This Encounter

## 2019-08-11 LAB
6-ACETYLMORPHINE, UR: NOT DETECTED
7-AMINOCLONAZEPAM, URINE: NOT DETECTED
ALPHA-OH-ALPRAZ, URINE: NOT DETECTED
ALPRAZOLAM, URINE: NOT DETECTED
AMPHETAMINES, URINE: NOT DETECTED
BARBITURATES, URINE: NOT DETECTED
BENZOYLECGONINE, UR: NOT DETECTED
BUPRENORPHINE URINE: NOT DETECTED
CARISOPRODOL, UR: NOT DETECTED
CLONAZEPAM, URINE: NOT DETECTED
CODEINE, URINE: NOT DETECTED
CREATININE URINE: 74.8 MG/DL (ref 20–400)
DIAZEPAM, URINE: NOT DETECTED
DRUGS EXPECTED, UR: NORMAL
EER HI RES INTERP UR: NORMAL
ETHYL GLUCURONIDE UR: NOT DETECTED
FENTANYL URINE: NOT DETECTED
HYDROCODONE, URINE: NOT DETECTED
HYDROMORPHONE, URINE: NOT DETECTED
LORAZEPAM, URINE: NOT DETECTED
MARIJUANA METAB, UR: NOT DETECTED
MDA, UR: NOT DETECTED
MDEA, EVE, UR: NOT DETECTED
MDMA URINE: NOT DETECTED
MEPERIDINE METAB, UR: NOT DETECTED
METHADONE, URINE: NOT DETECTED
METHAMPHETAMINE, URINE: NOT DETECTED
METHYLPHENIDATE: NOT DETECTED
MIDAZOLAM, URINE: NOT DETECTED
MORPHINE URINE: NOT DETECTED
NORBUPRENORPHINE, URINE: NOT DETECTED
NORDIAZEPAM, URINE: NOT DETECTED
NORFENTANYL, URINE: NOT DETECTED
NORHYDROCODONE, URINE: NOT DETECTED
NOROXYCODONE, URINE: NOT DETECTED
NOROXYMORPHONE, URINE: NOT DETECTED
OXAZEPAM, URINE: NOT DETECTED
OXYCODONE URINE: NOT DETECTED
OXYMORPHONE, URINE: NOT DETECTED
PAIN MANAGEMENT DRUG PANEL INTERP, URINE: NORMAL
PAIN MGT DRUG PANEL, HI RES, UR: NORMAL
PCP,URINE: NOT DETECTED
PHENTERMINE, UR: NOT DETECTED
PROPOXYPHENE, URINE: NOT DETECTED
TAPENTADOL, URINE: NOT DETECTED
TAPENTADOL-O-SULFATE, URINE: NOT DETECTED
TEMAZEPAM, URINE: NOT DETECTED
TRAMADOL, URINE: NOT DETECTED
ZOLPIDEM, URINE: NOT DETECTED

## 2019-08-23 ENCOUNTER — HOSPITAL ENCOUNTER (OUTPATIENT)
Dept: GENERAL RADIOLOGY | Age: 59
Discharge: HOME | End: 2019-08-25
Payer: MEDICARE

## 2019-08-23 ENCOUNTER — HOSPITAL ENCOUNTER (OUTPATIENT)
Dept: PAIN MANAGEMENT | Age: 59
Discharge: HOME | End: 2019-08-23
Payer: MEDICARE

## 2019-08-23 VITALS
OXYGEN SATURATION: 96 % | WEIGHT: 180 LBS | HEART RATE: 96 BPM | BODY MASS INDEX: 28.93 KG/M2 | TEMPERATURE: 97.4 F | HEIGHT: 66 IN | DIASTOLIC BLOOD PRESSURE: 76 MMHG | SYSTOLIC BLOOD PRESSURE: 120 MMHG | RESPIRATION RATE: 18 BRPM

## 2019-08-23 DIAGNOSIS — M47.816 ARTHROPATHY OF LUMBAR FACET JOINT: ICD-10-CM

## 2019-08-23 DIAGNOSIS — M51.36 DDD (DEGENERATIVE DISC DISEASE), LUMBAR: ICD-10-CM

## 2019-08-23 DIAGNOSIS — M47.816 SPONDYLOSIS OF LUMBAR REGION WITHOUT MYELOPATHY OR RADICULOPATHY: ICD-10-CM

## 2019-08-23 DIAGNOSIS — M47.816 SPONDYLOSIS OF LUMBAR REGION WITHOUT MYELOPATHY OR RADICULOPATHY: Primary | ICD-10-CM

## 2019-08-23 PROCEDURE — 64494 INJ PARAVERT F JNT L/S 2 LEV: CPT | Performed by: PAIN MEDICINE

## 2019-08-23 PROCEDURE — 6360000004 HC RX CONTRAST MEDICATION: Performed by: PAIN MEDICINE

## 2019-08-23 PROCEDURE — 64493 INJ PARAVERT F JNT L/S 1 LEV: CPT | Performed by: PAIN MEDICINE

## 2019-08-23 PROCEDURE — 64495 INJ PARAVERT F JNT L/S 3 LEV: CPT | Performed by: PAIN MEDICINE

## 2019-08-23 PROCEDURE — 64494 INJ PARAVERT F JNT L/S 2 LEV: CPT

## 2019-08-23 PROCEDURE — 64495 INJ PARAVERT F JNT L/S 3 LEV: CPT

## 2019-08-23 PROCEDURE — 2500000003 HC RX 250 WO HCPCS: Performed by: PAIN MEDICINE

## 2019-08-23 PROCEDURE — 64493 INJ PARAVERT F JNT L/S 1 LEV: CPT

## 2019-08-23 PROCEDURE — 6360000002 HC RX W HCPCS: Performed by: PAIN MEDICINE

## 2019-08-23 PROCEDURE — 3209999900 FLUORO FOR SURGICAL PROCEDURES

## 2019-08-23 RX ORDER — CYCLOSPORINE 0.5 MG/ML
EMULSION OPHTHALMIC
COMMUNITY
Start: 2019-08-08 | End: 2019-10-01

## 2019-08-23 RX ORDER — BUPIVACAINE HYDROCHLORIDE 5 MG/ML
INJECTION, SOLUTION EPIDURAL; INTRACAUDAL
Status: COMPLETED | OUTPATIENT
Start: 2019-08-23 | End: 2019-08-23

## 2019-08-23 RX ORDER — MIDAZOLAM HYDROCHLORIDE 1 MG/ML
INJECTION INTRAMUSCULAR; INTRAVENOUS
Status: COMPLETED | OUTPATIENT
Start: 2019-08-23 | End: 2019-08-23

## 2019-08-23 RX ORDER — TRIAMCINOLONE ACETONIDE 40 MG/ML
INJECTION, SUSPENSION INTRA-ARTICULAR; INTRAMUSCULAR
Status: COMPLETED | OUTPATIENT
Start: 2019-08-23 | End: 2019-08-23

## 2019-08-23 RX ADMIN — TRIAMCINOLONE ACETONIDE 80 MG: 40 INJECTION, SUSPENSION INTRA-ARTICULAR; INTRAMUSCULAR at 10:51

## 2019-08-23 RX ADMIN — BUPIVACAINE HYDROCHLORIDE 15 ML: 5 INJECTION, SOLUTION EPIDURAL; INTRACAUDAL; PERINEURAL at 10:50

## 2019-08-23 RX ADMIN — IOHEXOL 2 ML: 180 INJECTION INTRAVENOUS at 10:50

## 2019-08-23 RX ADMIN — MIDAZOLAM 1 MG: 1 INJECTION INTRAMUSCULAR; INTRAVENOUS at 10:38

## 2019-08-23 ASSESSMENT — PAIN DESCRIPTION - PAIN TYPE: TYPE: CHRONIC PAIN

## 2019-08-23 ASSESSMENT — PAIN - FUNCTIONAL ASSESSMENT
PAIN_FUNCTIONAL_ASSESSMENT: 0-10
PAIN_FUNCTIONAL_ASSESSMENT: 0-10
PAIN_FUNCTIONAL_ASSESSMENT: PREVENTS OR INTERFERES SOME ACTIVE ACTIVITIES AND ADLS

## 2019-08-23 ASSESSMENT — PAIN DESCRIPTION - LOCATION: LOCATION: BACK

## 2019-08-23 ASSESSMENT — ACTIVITIES OF DAILY LIVING (ADL): EFFECT OF PAIN ON DAILY ACTIVITIES: PAIN INCREASES WITH WALKING, BENDING

## 2019-08-23 ASSESSMENT — PAIN DESCRIPTION - ONSET: ONSET: ON-GOING

## 2019-08-23 ASSESSMENT — PAIN DESCRIPTION - DESCRIPTORS: DESCRIPTORS: BURNING;THROBBING

## 2019-08-23 ASSESSMENT — PAIN DESCRIPTION - ORIENTATION: ORIENTATION: LOWER;RIGHT;LEFT

## 2019-08-23 ASSESSMENT — PAIN DESCRIPTION - FREQUENCY: FREQUENCY: CONTINUOUS

## 2019-08-23 ASSESSMENT — PAIN SCALES - GENERAL: PAINLEVEL_OUTOF10: 6

## 2019-08-23 ASSESSMENT — PAIN DESCRIPTION - PROGRESSION: CLINICAL_PROGRESSION: GRADUALLY WORSENING

## 2019-08-23 NOTE — PROCEDURES
Spondylosis of lumbar region without myelopathy or radiculopathy    2. Arthropathy of lumbar facet joint    3. DDD (degenerative disc disease), lumbar        Procedure Performed[de-identified]  Lumbar facet joint injections at the levels of L1-L2, L2-L3, L3-L4, L4-L5, L5-S1 on the Left side with fluoroscopy guidance, with IV sedation. Indication for the Procedure: The patient had history of chronic low back pain which is not responding well to the conservative treatment. The patient's pain is mostly axial in nature. Pain is interfering with the activities of daily living. Physical examination revealed facet tenderness and facet loading is positive. We decided to try lumbar facet joint injection for diagnostic as well as for therapeutic purposes. The procedure and its risks were discussed with the patient and an informed consent was obtained. .Current Pain Assessment  Pain Assessment  Pain Assessment: 0-10  Pain Level: 6  Patient's Stated Pain Goal: 1(increase activity)  Pain Type: Chronic pain  Pain Location: Back  Pain Orientation: Lower, Right, Left(worse on left side)  Pain Radiating Towards: left buttock, left hip  Pain Descriptors: Burning, Throbbing  Pain Frequency: Continuous  Pain Onset: On-going  Clinical Progression: Gradually worsening  Effect of Pain on Daily Activities: pain increases with walking, bending  Functional Pain Assessment: Prevents or interferes some active activities and ADLs  Non-Pharmaceutical Pain Intervention(s): Rest   Procedure:   After starting IV patient was sedated with 1 mg of Midazolam and 0 mcg of Fentanyl intravenously by the RN under my direct supervision. Patient's vital signs including BP, EKG and SaO2 were monitored by RN and they remained stable during the procedure. A meaningful communication was kept up with the patient throughout   the procedure. The patient is placed in prone position. Skin over the back was prepped and draped in sterile manner.  Under fluoroscopy

## 2019-08-23 NOTE — PROGRESS NOTES
Discharge criteria met. Post procedure dressing dry and intact. Sensory and motor function intact as per pre-procedure. Patient alert and oriented x3  Instructions and follow up reviewed with pt at patient at discharge. Discharged home transported by wheelchair, accompanied by family .   Discharged @[id number]

## 2019-08-26 ENCOUNTER — OFFICE VISIT (OUTPATIENT)
Dept: VASCULAR SURGERY | Age: 59
End: 2019-08-26
Payer: MEDICARE

## 2019-08-26 VITALS
BODY MASS INDEX: 29.97 KG/M2 | DIASTOLIC BLOOD PRESSURE: 87 MMHG | TEMPERATURE: 99 F | HEART RATE: 122 BPM | OXYGEN SATURATION: 97 % | RESPIRATION RATE: 18 BRPM | WEIGHT: 179.9 LBS | SYSTOLIC BLOOD PRESSURE: 140 MMHG | HEIGHT: 65 IN

## 2019-08-26 DIAGNOSIS — I73.9 PAD (PERIPHERAL ARTERY DISEASE) (HCC): ICD-10-CM

## 2019-08-26 DIAGNOSIS — Z95.828 HISTORY OF ENDOVASCULAR STENT GRAFT FOR ABDOMINAL AORTIC ANEURYSM (AAA): Primary | ICD-10-CM

## 2019-08-26 PROCEDURE — G8417 CALC BMI ABV UP PARAM F/U: HCPCS | Performed by: SURGERY

## 2019-08-26 PROCEDURE — G8427 DOCREV CUR MEDS BY ELIG CLIN: HCPCS | Performed by: SURGERY

## 2019-08-26 PROCEDURE — G8598 ASA/ANTIPLAT THER USED: HCPCS | Performed by: SURGERY

## 2019-08-26 PROCEDURE — 99213 OFFICE O/P EST LOW 20 MIN: CPT | Performed by: SURGERY

## 2019-08-26 PROCEDURE — 4004F PT TOBACCO SCREEN RCVD TLK: CPT | Performed by: SURGERY

## 2019-08-26 PROCEDURE — 3017F COLORECTAL CA SCREEN DOC REV: CPT | Performed by: SURGERY

## 2019-08-26 ASSESSMENT — ENCOUNTER SYMPTOMS
ALLERGIC/IMMUNOLOGIC NEGATIVE: 1
CHEST TIGHTNESS: 0
COLOR CHANGE: 0
SHORTNESS OF BREATH: 0
BACK PAIN: 1
ABDOMINAL PAIN: 0

## 2019-08-26 NOTE — PROGRESS NOTES
Division of Vascular Surgery        Follow Up      Chief Complaint:      Shoulder discomfort    History of Present Illness:      Ayaz Hendrickson is a 62 y.o. woman who presents for follow up regarding her AAA repair and PAD. This was done several years ago by another surgeon. Complicated by limb occlusion requiring right to left fem-fem bypass. She was having a lot of back and hip pain but did not seem to be vascular in nature. She has undergone physical therapy and back injections which has helped quite a bit. She denies any symptoms suggestive of lower extremity claudication or ischemic rest pain. She does not have any open wounds or sores on her feet.     Medical History:     Past Medical History:   Diagnosis Date    AA (aortic aneurysm) ([facility])     MILD SUPRA-RENAL    Anxiety     Blood clot in vein     Bronchitis     Cervical cancer ([facility])     Cervix no chemo or radiation    CKD (chronic kidney disease) stage 3, GFR 30-59 ml/min ([facility])     Depression     Hx of blood clots     Hyperlipidemia     Hyperlipidemia     Hypertension     Nephrolith     Peripheral vascular disease ([facility]     Prediabetes     Suicide attempt ([facility]     2012 x2 with pills       Surgical History:     Past Surgical History:   Procedure Laterality Date    ANGIOPLASTY      RT. FEMORAL ARTERY/BILATERAL ILIAC ARTERY    CHOLECYSTECTOMY      EMBOLECTOMY  Oct 16 2013     BILATERAL ILIAC    HYSTERECTOMY  1995    TOTAL/CERVICAL CANCER    KNEE ARTHROSCOPY      TOE AMPUTATION Left 12/21/15    5TH PARTIAL-baby toe    TUBAL LIGATION      VASCULAR SURGERY      fem-fem       Family History:     Family History   Problem Relation Age of Onset    Diabetes Brother     High Blood Pressure Brother     Coronary Art Dis Brother     Seizures Brother     Other Mother         renal failure,thyroidectomy    Diabetes Mother     Heart Disease Father     Hearing Loss Brother        Allergies:       Patient has no known

## 2019-08-27 ENCOUNTER — TELEPHONE (OUTPATIENT)
Dept: PAIN MANAGEMENT | Age: 59
End: 2019-08-27

## 2019-09-05 ENCOUNTER — HOSPITAL ENCOUNTER (OUTPATIENT)
Dept: PAIN MANAGEMENT | Age: 59
Discharge: HOME | End: 2019-09-05
Payer: MEDICARE

## 2019-09-05 VITALS
DIASTOLIC BLOOD PRESSURE: 72 MMHG | SYSTOLIC BLOOD PRESSURE: 138 MMHG | WEIGHT: 180 LBS | HEIGHT: 65 IN | HEART RATE: 88 BPM | RESPIRATION RATE: 16 BRPM | BODY MASS INDEX: 29.99 KG/M2

## 2019-09-05 DIAGNOSIS — M47.816 SPONDYLOSIS OF LUMBAR REGION WITHOUT MYELOPATHY OR RADICULOPATHY: Primary | ICD-10-CM

## 2019-09-05 DIAGNOSIS — M54.50 LOW BACK PAIN POTENTIALLY ASSOCIATED WITH RADICULOPATHY: ICD-10-CM

## 2019-09-05 PROCEDURE — 99213 OFFICE O/P EST LOW 20 MIN: CPT

## 2019-09-05 PROCEDURE — 99214 OFFICE O/P EST MOD 30 MIN: CPT | Performed by: NURSE PRACTITIONER

## 2019-09-05 RX ORDER — CLOPIDOGREL BISULFATE 75 MG/1
TABLET ORAL
COMMUNITY
Start: 2019-07-08 | End: 2019-09-05 | Stop reason: ALTCHOICE

## 2019-09-05 RX ORDER — LISINOPRIL 10 MG/1
TABLET ORAL
COMMUNITY
Start: 2019-07-31 | End: 2019-09-05 | Stop reason: ALTCHOICE

## 2019-09-05 ASSESSMENT — ENCOUNTER SYMPTOMS
CONSTIPATION: 0
SHORTNESS OF BREATH: 0
BACK PAIN: 1
COUGH: 0

## 2019-09-16 ENCOUNTER — HOSPITAL ENCOUNTER (OUTPATIENT)
Age: 59
Discharge: HOME | End: 2019-09-16
Payer: MEDICARE

## 2019-09-16 DIAGNOSIS — N18.30 BENIGN HYPERTENSION WITH CKD (CHRONIC KIDNEY DISEASE) STAGE III (HCC): ICD-10-CM

## 2019-09-16 DIAGNOSIS — N20.0 NEPHROLITH: ICD-10-CM

## 2019-09-16 DIAGNOSIS — N18.30 SECONDARY DIABETES MELLITUS WITH STAGE 3 CHRONIC KIDNEY DISEASE (HCC): ICD-10-CM

## 2019-09-16 DIAGNOSIS — E78.5 HYPERLIPIDEMIA, UNSPECIFIED HYPERLIPIDEMIA TYPE: ICD-10-CM

## 2019-09-16 DIAGNOSIS — E11.59 TYPE 2 DIABETES MELLITUS WITH OTHER CIRCULATORY COMPLICATION, WITHOUT LONG-TERM CURRENT USE OF INSULIN (HCC): ICD-10-CM

## 2019-09-16 DIAGNOSIS — E13.22 SECONDARY DIABETES MELLITUS WITH STAGE 3 CHRONIC KIDNEY DISEASE (HCC): ICD-10-CM

## 2019-09-16 DIAGNOSIS — N18.30 CHRONIC KIDNEY DISEASE, STAGE III (MODERATE) (HCC): ICD-10-CM

## 2019-09-16 DIAGNOSIS — I12.9 BENIGN HYPERTENSION WITH CKD (CHRONIC KIDNEY DISEASE) STAGE III (HCC): ICD-10-CM

## 2019-09-16 LAB
ABSOLUTE EOS #: 0.2 K/UL (ref 0–0.4)
ABSOLUTE IMMATURE GRANULOCYTE: NORMAL K/UL (ref 0–0.3)
ABSOLUTE LYMPH #: 2.7 K/UL (ref 1–4.8)
ABSOLUTE MONO #: 0.7 K/UL (ref 0.1–1.3)
ALBUMIN SERPL-MCNC: 4.1 G/DL (ref 3.5–5.2)
ALBUMIN/GLOBULIN RATIO: ABNORMAL (ref 1–2.5)
ALP BLD-CCNC: 88 U/L (ref 35–104)
ALT SERPL-CCNC: 16 U/L (ref 5–33)
ANION GAP SERPL CALCULATED.3IONS-SCNC: 14 MMOL/L (ref 9–17)
ANION GAP SERPL CALCULATED.3IONS-SCNC: 14 MMOL/L (ref 9–17)
AST SERPL-CCNC: 14 U/L
BASOPHILS # BLD: 1 % (ref 0–2)
BASOPHILS ABSOLUTE: 0.1 K/UL (ref 0–0.2)
BILIRUB SERPL-MCNC: <0.15 MG/DL (ref 0.3–1.2)
BUN BLDV-MCNC: 44 MG/DL (ref 8–23)
BUN BLDV-MCNC: 44 MG/DL (ref 8–23)
BUN/CREAT BLD: ABNORMAL (ref 9–20)
BUN/CREAT BLD: ABNORMAL (ref 9–20)
CALCIUM SERPL-MCNC: 9.6 MG/DL (ref 8.6–10.4)
CALCIUM SERPL-MCNC: 9.6 MG/DL (ref 8.6–10.4)
CHLORIDE BLD-SCNC: 101 MMOL/L (ref 98–107)
CHLORIDE BLD-SCNC: 101 MMOL/L (ref 98–107)
CHOLESTEROL/HDL RATIO: 4.1
CHOLESTEROL: 139 MG/DL
CO2: 23 MMOL/L (ref 20–31)
CO2: 23 MMOL/L (ref 20–31)
CREAT SERPL-MCNC: 1.4 MG/DL (ref 0.5–0.9)
CREAT SERPL-MCNC: 1.4 MG/DL (ref 0.5–0.9)
DIFFERENTIAL TYPE: NORMAL
EOSINOPHILS RELATIVE PERCENT: 2 % (ref 0–4)
ESTIMATED AVERAGE GLUCOSE: 166 MG/DL
GFR AFRICAN AMERICAN: 46 ML/MIN
GFR AFRICAN AMERICAN: 46 ML/MIN
GFR NON-AFRICAN AMERICAN: 38 ML/MIN
GFR NON-AFRICAN AMERICAN: 38 ML/MIN
GFR SERPL CREATININE-BSD FRML MDRD: ABNORMAL ML/MIN/{1.73_M2}
GLUCOSE BLD-MCNC: 139 MG/DL (ref 70–99)
GLUCOSE BLD-MCNC: 139 MG/DL (ref 70–99)
HBA1C MFR BLD: 7.4 % (ref 4–6)
HCT VFR BLD CALC: 42.1 % (ref 36–46)
HDLC SERPL-MCNC: 34 MG/DL
HEMOGLOBIN: 14 G/DL (ref 12–16)
IMMATURE GRANULOCYTES: NORMAL %
LDL CHOLESTEROL: 55 MG/DL (ref 0–130)
LYMPHOCYTES # BLD: 28 % (ref 24–44)
MAGNESIUM: 2.3 MG/DL (ref 1.6–2.6)
MCH RBC QN AUTO: 30.8 PG (ref 26–34)
MCHC RBC AUTO-ENTMCNC: 33.2 G/DL (ref 31–37)
MCV RBC AUTO: 92.9 FL (ref 80–100)
MONOCYTES # BLD: 7 % (ref 1–7)
NRBC AUTOMATED: NORMAL PER 100 WBC
PDW BLD-RTO: 14.8 % (ref 11.5–14.9)
PHOSPHORUS: 5 MG/DL (ref 2.6–4.5)
PLATELET # BLD: 171 K/UL (ref 150–450)
PLATELET ESTIMATE: NORMAL
PMV BLD AUTO: 8.4 FL (ref 6–12)
POTASSIUM SERPL-SCNC: 4.7 MMOL/L (ref 3.7–5.3)
POTASSIUM SERPL-SCNC: 4.7 MMOL/L (ref 3.7–5.3)
RBC # BLD: 4.53 M/UL (ref 4–5.2)
RBC # BLD: NORMAL 10*6/UL
SEG NEUTROPHILS: 62 % (ref 36–66)
SEGMENTED NEUTROPHILS ABSOLUTE COUNT: 6.1 K/UL (ref 1.3–9.1)
SODIUM BLD-SCNC: 138 MMOL/L (ref 135–144)
SODIUM BLD-SCNC: 138 MMOL/L (ref 135–144)
TOTAL PROTEIN: 7.5 G/DL (ref 6.4–8.3)
TRIGL SERPL-MCNC: 251 MG/DL
VLDLC SERPL CALC-MCNC: ABNORMAL MG/DL (ref 1–30)
WBC # BLD: 9.9 K/UL (ref 3.5–11)
WBC # BLD: NORMAL 10*3/UL

## 2019-09-16 PROCEDURE — 83036 HEMOGLOBIN GLYCOSYLATED A1C: CPT

## 2019-09-16 PROCEDURE — 83735 ASSAY OF MAGNESIUM: CPT

## 2019-09-16 PROCEDURE — 85025 COMPLETE CBC W/AUTO DIFF WBC: CPT

## 2019-09-16 PROCEDURE — 80048 BASIC METABOLIC PNL TOTAL CA: CPT

## 2019-09-16 PROCEDURE — 80053 COMPREHEN METABOLIC PANEL: CPT

## 2019-09-16 PROCEDURE — 36415 COLL VENOUS BLD VENIPUNCTURE: CPT

## 2019-09-16 PROCEDURE — 80061 LIPID PANEL: CPT

## 2019-09-16 PROCEDURE — 84100 ASSAY OF PHOSPHORUS: CPT

## 2019-09-17 ENCOUNTER — HOSPITAL ENCOUNTER (OUTPATIENT)
Dept: PAIN MANAGEMENT | Age: 59
Discharge: HOME | End: 2019-09-17
Payer: MEDICARE

## 2019-09-17 ENCOUNTER — HOSPITAL ENCOUNTER (OUTPATIENT)
Dept: GENERAL RADIOLOGY | Age: 59
Discharge: HOME | End: 2019-09-19
Payer: MEDICARE

## 2019-09-17 VITALS
RESPIRATION RATE: 16 BRPM | TEMPERATURE: 98 F | HEART RATE: 95 BPM | HEIGHT: 65 IN | WEIGHT: 180 LBS | BODY MASS INDEX: 29.99 KG/M2 | SYSTOLIC BLOOD PRESSURE: 115 MMHG | OXYGEN SATURATION: 95 % | DIASTOLIC BLOOD PRESSURE: 69 MMHG

## 2019-09-17 DIAGNOSIS — M47.816 OSTEOARTHRITIS OF LUMBAR SPINE, UNSPECIFIED SPINAL OSTEOARTHRITIS COMPLICATION STATUS: ICD-10-CM

## 2019-09-17 DIAGNOSIS — M47.816 SPONDYLOSIS OF LUMBAR REGION WITHOUT MYELOPATHY OR RADICULOPATHY: ICD-10-CM

## 2019-09-17 DIAGNOSIS — M47.816 LUMBAR FACET ARTHROPATHY: ICD-10-CM

## 2019-09-17 DIAGNOSIS — M47.816 OSTEOARTHRITIS OF LUMBAR SPINE, UNSPECIFIED SPINAL OSTEOARTHRITIS COMPLICATION STATUS: Primary | ICD-10-CM

## 2019-09-17 PROCEDURE — 64493 INJ PARAVERT F JNT L/S 1 LEV: CPT

## 2019-09-17 PROCEDURE — 6360000002 HC RX W HCPCS: Performed by: PAIN MEDICINE

## 2019-09-17 PROCEDURE — 64495 INJ PARAVERT F JNT L/S 3 LEV: CPT

## 2019-09-17 PROCEDURE — 64495 INJ PARAVERT F JNT L/S 3 LEV: CPT | Performed by: PAIN MEDICINE

## 2019-09-17 PROCEDURE — 2500000003 HC RX 250 WO HCPCS: Performed by: PAIN MEDICINE

## 2019-09-17 PROCEDURE — 64494 INJ PARAVERT F JNT L/S 2 LEV: CPT | Performed by: PAIN MEDICINE

## 2019-09-17 PROCEDURE — 3209999900 FLUORO FOR SURGICAL PROCEDURES

## 2019-09-17 PROCEDURE — 6360000004 HC RX CONTRAST MEDICATION: Performed by: PAIN MEDICINE

## 2019-09-17 PROCEDURE — 64493 INJ PARAVERT F JNT L/S 1 LEV: CPT | Performed by: PAIN MEDICINE

## 2019-09-17 PROCEDURE — 64494 INJ PARAVERT F JNT L/S 2 LEV: CPT

## 2019-09-17 RX ORDER — FENTANYL CITRATE 50 UG/ML
INJECTION, SOLUTION INTRAMUSCULAR; INTRAVENOUS
Status: COMPLETED | OUTPATIENT
Start: 2019-09-17 | End: 2019-09-17

## 2019-09-17 RX ORDER — BUPIVACAINE HYDROCHLORIDE 5 MG/ML
INJECTION, SOLUTION EPIDURAL; INTRACAUDAL
Status: COMPLETED | OUTPATIENT
Start: 2019-09-17 | End: 2019-09-17

## 2019-09-17 RX ORDER — MIDAZOLAM HYDROCHLORIDE 1 MG/ML
INJECTION INTRAMUSCULAR; INTRAVENOUS
Status: COMPLETED | OUTPATIENT
Start: 2019-09-17 | End: 2019-09-17

## 2019-09-17 RX ORDER — TRIAMCINOLONE ACETONIDE 40 MG/ML
INJECTION, SUSPENSION INTRA-ARTICULAR; INTRAMUSCULAR
Status: COMPLETED | OUTPATIENT
Start: 2019-09-17 | End: 2019-09-17

## 2019-09-17 RX ADMIN — MIDAZOLAM 1 MG: 1 INJECTION INTRAMUSCULAR; INTRAVENOUS at 09:13

## 2019-09-17 RX ADMIN — Medication 50 MCG: at 09:16

## 2019-09-17 RX ADMIN — BUPIVACAINE HYDROCHLORIDE 4 ML: 5 INJECTION, SOLUTION EPIDURAL; INTRACAUDAL; PERINEURAL at 09:32

## 2019-09-17 RX ADMIN — IOHEXOL 2 ML: 180 INJECTION INTRAVENOUS at 09:32

## 2019-09-17 RX ADMIN — TRIAMCINOLONE ACETONIDE 80 MG: 40 INJECTION, SUSPENSION INTRA-ARTICULAR; INTRAMUSCULAR at 09:32

## 2019-09-17 ASSESSMENT — PAIN DESCRIPTION - LOCATION: LOCATION: BACK

## 2019-09-17 ASSESSMENT — PAIN DESCRIPTION - ORIENTATION: ORIENTATION: RIGHT;LEFT;LOWER

## 2019-09-17 ASSESSMENT — PAIN DESCRIPTION - PAIN TYPE: TYPE: CHRONIC PAIN

## 2019-09-17 ASSESSMENT — PAIN SCALES - GENERAL: PAINLEVEL_OUTOF10: 8

## 2019-09-17 ASSESSMENT — PAIN - FUNCTIONAL ASSESSMENT
PAIN_FUNCTIONAL_ASSESSMENT: 0-10
PAIN_FUNCTIONAL_ASSESSMENT: 0-10
PAIN_FUNCTIONAL_ASSESSMENT: PREVENTS OR INTERFERES WITH ALL ACTIVE AND SOME PASSIVE ACTIVITIES

## 2019-09-17 ASSESSMENT — PAIN DESCRIPTION - PROGRESSION: CLINICAL_PROGRESSION: GRADUALLY WORSENING

## 2019-09-17 ASSESSMENT — PAIN DESCRIPTION - DESCRIPTORS: DESCRIPTORS: CONSTANT;BURNING;THROBBING

## 2019-09-17 ASSESSMENT — PAIN DESCRIPTION - ONSET: ONSET: ON-GOING

## 2019-09-17 NOTE — PROGRESS NOTES
Discharge criteria met. Patient alert and oriented x3  Post procedure dressing dry and intact. Sensory and motor function intact as per pre-procedure. Instructions and follow up reviewed with pt at patient at discharge. Patient discharged ambulatory @ 9:55am        Patient discharged per wheelchair accompanied by volunteer and friend.

## 2019-09-17 NOTE — PROCEDURES
Pre-Procedure Note    Patient Name: Magalys Garg   YOB: 1960  Room/Bed: Room/bed info not found  Medical Record Number: [id number]  Date: 9/17/2019       Indication:    1. Osteoarthritis of lumbar spine, unspecified spinal osteoarthritis complication status    2. Spondylosis of lumbar region without myelopathy or radiculopathy    3. Lumbar facet arthropathy        Consent: On file. Vital Signs:   Vitals:    09/17/19 0928   BP: 103/65   Pulse: 96   Resp: 18   Temp:    SpO2: 90%       Past Medical History:   has a past medical history of AA (aortic aneurysm) ([facility] Anxiety, Blood clot in vein, Bronchitis, Cervical cancer ([facility] CKD (chronic kidney disease) stage 3, GFR 30-59 ml/min ([facility]), Depression, Hx of blood clots, Hyperlipidemia, Hyperlipidemia, Hypertension, Nephrolith, Peripheral vascular disease ([facility] Prediabetes, and Suicide attempt ([facility] Past Surgical History:   has a past surgical history that includes Hysterectomy (1995); Cholecystectomy; embolectomy (Oct 16 2013 ); angioplasty; vascular surgery; Toe amputation (Left, 12/21/15); Tubal ligation; and Knee arthroscopy. Pre-Sedation Documentation and Exam:   Vital signs have been reviewed (see flow sheet for vitals). Mallampati Airway Assessment:  normal    ASA Classification:  Class 3 - A patient with severe systemic disease that limits activity but is not incapacitating    Sedation/ Anesthesia Plan:   intravenous sedation   as needed    Medications Planned:   midazolam (Versed) / Fentanyl  Intravenously  as needed. Patient is an appropriate candidate for plan of sedation: yes  Patient's History and Physical examination was reviewed and there is no change. Electronically signed by Justo Nagy MD on 9/17/2019 at 9:38 AM        Preoperative Diagnosis:    1. Osteoarthritis of lumbar spine, unspecified spinal osteoarthritis complication status    2.  Spondylosis of lumbar region without myelopathy or radiculopathy 3. Lumbar facet arthropathy        Postoperative Diagnosis:   1. Osteoarthritis of lumbar spine, unspecified spinal osteoarthritis complication status    2. Spondylosis of lumbar region without myelopathy or radiculopathy    3. Lumbar facet arthropathy        Procedure Performed[de-identified]  Lumbar facet joint injections at the levels of L1-L2, L2-L3, L3-L4, L4-L5, L5-S1 on the Left side with fluoroscopy guidance, with IV sedation. Indication for the Procedure: The patient had history of chronic low back pain which is not responding well to the conservative treatment. The patient's pain is mostly axial in nature. Pain is interfering with the activities of daily living. Physical examination revealed facet tenderness and facet loading is positive. We decided to try lumbar facet joint injection for diagnostic as well as for therapeutic purposes. The procedure and its risks were discussed with the patient and an informed consent was obtained. .Current Pain Assessment  Pain Assessment  Pain Assessment: 0-10  Pain Level: 8  Patient's Stated Pain Goal: 2(increase activiity, decrease pain)  Pain Type: Chronic pain  Pain Location: Back  Pain Orientation: Right, Left, Lower(worse on left)  Pain Radiating Towards: back to left hip  Pain Descriptors: Constant, Burning, Throbbing  Pain Onset: On-going  Clinical Progression: Gradually worsening  Effect of Pain on Daily Activities: pain increases w/standing, walking, bending  Functional Pain Assessment: Prevents or interferes with all active and some passive activities  Non-Pharmaceutical Pain Intervention(s): Rest, Repositioned   Procedure:   After starting IV patient was sedated with 1 mg of Midazolam and 50 mcg of Fentanyl intravenously by the RN under my direct supervision. Patient's vital signs including BP, EKG and SaO2 were monitored by RN and they remained stable during the procedure.     A meaningful communication was kept up with the patient throughout   the

## 2019-09-23 PROBLEM — N20.0 KIDNEY STONE: Status: ACTIVE | Noted: 2019-01-17

## 2019-09-23 PROBLEM — E13.9 SECONDARY DIABETES MELLITUS (HCC): Status: ACTIVE | Noted: 2018-05-17

## 2019-10-01 ENCOUNTER — HOSPITAL ENCOUNTER (OUTPATIENT)
Dept: PAIN MANAGEMENT | Age: 59
Discharge: HOME | End: 2019-10-01
Payer: MEDICARE

## 2019-10-01 VITALS
HEART RATE: 92 BPM | SYSTOLIC BLOOD PRESSURE: 142 MMHG | HEIGHT: 66 IN | DIASTOLIC BLOOD PRESSURE: 81 MMHG | OXYGEN SATURATION: 98 % | TEMPERATURE: 98.5 F | WEIGHT: 179 LBS | BODY MASS INDEX: 28.77 KG/M2 | RESPIRATION RATE: 16 BRPM

## 2019-10-01 DIAGNOSIS — I73.9 CLAUDICATION (HCC): Primary | ICD-10-CM

## 2019-10-01 DIAGNOSIS — M47.816 LUMBAR SPONDYLOSIS: ICD-10-CM

## 2019-10-01 DIAGNOSIS — M47.816 LUMBAR FACET ARTHROPATHY: ICD-10-CM

## 2019-10-01 DIAGNOSIS — M54.50 LOW BACK PAIN POTENTIALLY ASSOCIATED WITH RADICULOPATHY: ICD-10-CM

## 2019-10-01 PROCEDURE — 99213 OFFICE O/P EST LOW 20 MIN: CPT | Performed by: NURSE PRACTITIONER

## 2019-10-01 PROCEDURE — 99213 OFFICE O/P EST LOW 20 MIN: CPT

## 2019-10-01 RX ORDER — KETOROLAC TROMETHAMINE 5 MG/ML
SOLUTION OPHTHALMIC
COMMUNITY
Start: 2019-09-27 | End: 2020-06-01

## 2019-10-01 ASSESSMENT — ENCOUNTER SYMPTOMS
GASTROINTESTINAL NEGATIVE: 1
BACK PAIN: 1
COUGH: 1

## 2019-10-07 ENCOUNTER — HOSPITAL ENCOUNTER (OUTPATIENT)
Dept: PAIN MANAGEMENT | Age: 59
Discharge: HOME | End: 2019-10-07
Payer: MEDICARE

## 2019-10-07 ENCOUNTER — HOSPITAL ENCOUNTER (OUTPATIENT)
Dept: GENERAL RADIOLOGY | Age: 59
Discharge: HOME | End: 2019-10-09
Payer: MEDICARE

## 2019-10-07 VITALS
RESPIRATION RATE: 20 BRPM | HEART RATE: 100 BPM | HEIGHT: 66 IN | SYSTOLIC BLOOD PRESSURE: 134 MMHG | DIASTOLIC BLOOD PRESSURE: 88 MMHG | TEMPERATURE: 97.9 F | OXYGEN SATURATION: 92 % | WEIGHT: 179 LBS | BODY MASS INDEX: 28.77 KG/M2

## 2019-10-07 DIAGNOSIS — M47.816 LUMBAR SPONDYLOSIS: Primary | ICD-10-CM

## 2019-10-07 DIAGNOSIS — M47.816 LUMBAR FACET ARTHROPATHY: ICD-10-CM

## 2019-10-07 DIAGNOSIS — M47.816 OSTEOARTHRITIS OF LUMBAR SPINE, UNSPECIFIED SPINAL OSTEOARTHRITIS COMPLICATION STATUS: ICD-10-CM

## 2019-10-07 DIAGNOSIS — M47.816 LUMBAR SPONDYLOSIS: ICD-10-CM

## 2019-10-07 PROCEDURE — 2500000003 HC RX 250 WO HCPCS: Performed by: PAIN MEDICINE

## 2019-10-07 PROCEDURE — 3209999900 FLUORO FOR SURGICAL PROCEDURES

## 2019-10-07 PROCEDURE — 64495 INJ PARAVERT F JNT L/S 3 LEV: CPT

## 2019-10-07 PROCEDURE — 64493 INJ PARAVERT F JNT L/S 1 LEV: CPT

## 2019-10-07 PROCEDURE — 64494 INJ PARAVERT F JNT L/S 2 LEV: CPT

## 2019-10-07 PROCEDURE — 64494 INJ PARAVERT F JNT L/S 2 LEV: CPT | Performed by: PAIN MEDICINE

## 2019-10-07 PROCEDURE — 6360000002 HC RX W HCPCS: Performed by: PAIN MEDICINE

## 2019-10-07 PROCEDURE — 6360000004 HC RX CONTRAST MEDICATION: Performed by: PAIN MEDICINE

## 2019-10-07 PROCEDURE — 64495 INJ PARAVERT F JNT L/S 3 LEV: CPT | Performed by: PAIN MEDICINE

## 2019-10-07 PROCEDURE — 64493 INJ PARAVERT F JNT L/S 1 LEV: CPT | Performed by: PAIN MEDICINE

## 2019-10-07 RX ORDER — FENTANYL CITRATE 50 UG/ML
INJECTION, SOLUTION INTRAMUSCULAR; INTRAVENOUS
Status: COMPLETED | OUTPATIENT
Start: 2019-10-07 | End: 2019-10-07

## 2019-10-07 RX ORDER — MIDAZOLAM HYDROCHLORIDE 1 MG/ML
INJECTION INTRAMUSCULAR; INTRAVENOUS
Status: COMPLETED | OUTPATIENT
Start: 2019-10-07 | End: 2019-10-07

## 2019-10-07 RX ORDER — BUPIVACAINE HYDROCHLORIDE 5 MG/ML
INJECTION, SOLUTION EPIDURAL; INTRACAUDAL
Status: COMPLETED | OUTPATIENT
Start: 2019-10-07 | End: 2019-10-07

## 2019-10-07 RX ADMIN — BUPIVACAINE HYDROCHLORIDE 14 ML: 5 INJECTION, SOLUTION EPIDURAL; INTRACAUDAL; PERINEURAL at 09:44

## 2019-10-07 RX ADMIN — IOHEXOL 3 ML: 180 INJECTION INTRAVENOUS at 09:45

## 2019-10-07 RX ADMIN — MIDAZOLAM 2 MG: 1 INJECTION INTRAMUSCULAR; INTRAVENOUS at 09:34

## 2019-10-07 RX ADMIN — Medication 25 MCG: at 09:39

## 2019-10-07 ASSESSMENT — PAIN SCALES - GENERAL: PAINLEVEL_OUTOF10: 3

## 2019-10-07 ASSESSMENT — PAIN DESCRIPTION - DESCRIPTORS: DESCRIPTORS: BURNING;CONSTANT;THROBBING

## 2019-10-07 ASSESSMENT — PAIN - FUNCTIONAL ASSESSMENT: PAIN_FUNCTIONAL_ASSESSMENT: 0-10

## 2019-10-08 ENCOUNTER — TELEPHONE (OUTPATIENT)
Dept: PAIN MANAGEMENT | Age: 59
End: 2019-10-08

## 2019-10-22 ENCOUNTER — HOSPITAL ENCOUNTER (OUTPATIENT)
Dept: PAIN MANAGEMENT | Age: 59
Discharge: HOME | End: 2019-10-22
Payer: MEDICARE

## 2019-10-22 VITALS
RESPIRATION RATE: 16 BRPM | DIASTOLIC BLOOD PRESSURE: 83 MMHG | TEMPERATURE: 98 F | HEIGHT: 66 IN | SYSTOLIC BLOOD PRESSURE: 151 MMHG | HEART RATE: 88 BPM | OXYGEN SATURATION: 96 % | BODY MASS INDEX: 28.77 KG/M2 | WEIGHT: 179 LBS

## 2019-10-22 DIAGNOSIS — M54.50 LOW BACK PAIN POTENTIALLY ASSOCIATED WITH RADICULOPATHY: ICD-10-CM

## 2019-10-22 DIAGNOSIS — I73.9 CLAUDICATION (HCC): Primary | ICD-10-CM

## 2019-10-22 DIAGNOSIS — M47.816 LUMBAR SPONDYLOSIS: ICD-10-CM

## 2019-10-22 DIAGNOSIS — I73.9 PERIPHERAL VASCULAR DISEASE, UNSPECIFIED (HCC): ICD-10-CM

## 2019-10-22 DIAGNOSIS — M47.816 LUMBAR FACET ARTHROPATHY: ICD-10-CM

## 2019-10-22 PROCEDURE — 99213 OFFICE O/P EST LOW 20 MIN: CPT

## 2019-10-22 PROCEDURE — 99213 OFFICE O/P EST LOW 20 MIN: CPT | Performed by: NURSE PRACTITIONER

## 2019-10-22 RX ORDER — IBUPROFEN 200 MG
200 TABLET ORAL PRN
COMMUNITY
End: 2021-01-21

## 2019-10-22 RX ORDER — ACETAMINOPHEN 500 MG
500 TABLET ORAL PRN
COMMUNITY

## 2019-10-22 ASSESSMENT — ENCOUNTER SYMPTOMS
CONSTIPATION: 1
BACK PAIN: 1
EYES NEGATIVE: 1

## 2020-01-07 ENCOUNTER — HOSPITAL ENCOUNTER (OUTPATIENT)
Age: 60
Setting detail: SPECIMEN
Discharge: HOME | End: 2020-01-07
Payer: COMMERCIAL

## 2020-01-09 LAB
CULTURE: ABNORMAL
DIRECT EXAM: ABNORMAL
Lab: ABNORMAL
SPECIMEN DESCRIPTION: ABNORMAL

## 2020-06-15 ENCOUNTER — HOSPITAL ENCOUNTER (OUTPATIENT)
Dept: VASCULAR LAB | Age: 60
Discharge: HOME | End: 2020-06-15
Payer: COMMERCIAL

## 2020-06-15 ENCOUNTER — HOSPITAL ENCOUNTER (OUTPATIENT)
Age: 60
Discharge: HOME | End: 2020-06-15
Payer: COMMERCIAL

## 2020-06-15 LAB
ABSOLUTE EOS #: 0.2 K/UL (ref 0–0.4)
ABSOLUTE IMMATURE GRANULOCYTE: ABNORMAL K/UL (ref 0–0.3)
ABSOLUTE LYMPH #: 2.4 K/UL (ref 1–4.8)
ABSOLUTE MONO #: 0.8 K/UL (ref 0.1–1.3)
ALBUMIN SERPL-MCNC: 4 G/DL (ref 3.5–5.2)
ALBUMIN/GLOBULIN RATIO: ABNORMAL (ref 1–2.5)
ALP BLD-CCNC: 93 U/L (ref 35–104)
ALT SERPL-CCNC: 10 U/L (ref 5–33)
ANION GAP SERPL CALCULATED.3IONS-SCNC: 10 MMOL/L (ref 9–17)
AST SERPL-CCNC: 14 U/L
BASOPHILS # BLD: 1 % (ref 0–2)
BASOPHILS ABSOLUTE: 0.1 K/UL (ref 0–0.2)
BILIRUB SERPL-MCNC: 0.17 MG/DL (ref 0.3–1.2)
BUN BLDV-MCNC: 11 MG/DL (ref 8–23)
BUN/CREAT BLD: ABNORMAL (ref 9–20)
CALCIUM SERPL-MCNC: 9.1 MG/DL (ref 8.6–10.4)
CHLORIDE BLD-SCNC: 99 MMOL/L (ref 98–107)
CHOLESTEROL/HDL RATIO: 4.3
CHOLESTEROL: 134 MG/DL
CO2: 27 MMOL/L (ref 20–31)
CREAT SERPL-MCNC: 0.92 MG/DL (ref 0.5–0.9)
CREATININE URINE: 30.6 MG/DL (ref 28–217)
DIFFERENTIAL TYPE: ABNORMAL
EOSINOPHILS RELATIVE PERCENT: 2 % (ref 0–4)
ESTIMATED AVERAGE GLUCOSE: 146 MG/DL
GFR AFRICAN AMERICAN: >60 ML/MIN
GFR NON-AFRICAN AMERICAN: >60 ML/MIN
GFR SERPL CREATININE-BSD FRML MDRD: ABNORMAL ML/MIN/{1.73_M2}
GFR SERPL CREATININE-BSD FRML MDRD: ABNORMAL ML/MIN/{1.73_M2}
GLUCOSE BLD-MCNC: 126 MG/DL (ref 70–99)
HBA1C MFR BLD: 6.7 % (ref 4–6)
HCT VFR BLD CALC: 44.5 % (ref 36–46)
HDLC SERPL-MCNC: 31 MG/DL
HEMOGLOBIN: 15 G/DL (ref 12–16)
IMMATURE GRANULOCYTES: ABNORMAL %
LDL CHOLESTEROL: 51 MG/DL (ref 0–130)
LYMPHOCYTES # BLD: 20 % (ref 24–44)
MCH RBC QN AUTO: 31.4 PG (ref 26–34)
MCHC RBC AUTO-ENTMCNC: 33.8 G/DL (ref 31–37)
MCV RBC AUTO: 92.8 FL (ref 80–100)
MICROALBUMIN/CREAT 24H UR: 195 MG/L
MICROALBUMIN/CREAT UR-RTO: 637 MCG/MG CREAT
MONOCYTES # BLD: 7 % (ref 1–7)
NRBC AUTOMATED: ABNORMAL PER 100 WBC
PDW BLD-RTO: 15.2 % (ref 11.5–14.9)
PLATELET # BLD: 188 K/UL (ref 150–450)
PLATELET ESTIMATE: ABNORMAL
PMV BLD AUTO: 8.4 FL (ref 6–12)
POTASSIUM SERPL-SCNC: 3.8 MMOL/L (ref 3.7–5.3)
RBC # BLD: 4.8 M/UL (ref 4–5.2)
RBC # BLD: ABNORMAL 10*6/UL
SEG NEUTROPHILS: 70 % (ref 36–66)
SEGMENTED NEUTROPHILS ABSOLUTE COUNT: 8.2 K/UL (ref 1.3–9.1)
SODIUM BLD-SCNC: 136 MMOL/L (ref 135–144)
TOTAL PROTEIN: 7.6 G/DL (ref 6.4–8.3)
TRIGL SERPL-MCNC: 260 MG/DL
VLDLC SERPL CALC-MCNC: ABNORMAL MG/DL (ref 1–30)
WBC # BLD: 11.7 K/UL (ref 3.5–11)
WBC # BLD: ABNORMAL 10*3/UL

## 2020-06-15 PROCEDURE — 80053 COMPREHEN METABOLIC PANEL: CPT

## 2020-06-15 PROCEDURE — 93926 LOWER EXTREMITY STUDY: CPT

## 2020-06-15 PROCEDURE — 82043 UR ALBUMIN QUANTITATIVE: CPT

## 2020-06-15 PROCEDURE — 83036 HEMOGLOBIN GLYCOSYLATED A1C: CPT

## 2020-06-15 PROCEDURE — 85025 COMPLETE CBC W/AUTO DIFF WBC: CPT

## 2020-06-15 PROCEDURE — 93978 VASCULAR STUDY: CPT

## 2020-06-15 PROCEDURE — 82570 ASSAY OF URINE CREATININE: CPT

## 2020-06-15 PROCEDURE — 36415 COLL VENOUS BLD VENIPUNCTURE: CPT

## 2020-06-15 PROCEDURE — 80061 LIPID PANEL: CPT

## 2020-07-13 ENCOUNTER — OFFICE VISIT (OUTPATIENT)
Dept: VASCULAR SURGERY | Age: 60
End: 2020-07-13
Payer: COMMERCIAL

## 2020-07-13 VITALS
WEIGHT: 172 LBS | HEIGHT: 65 IN | TEMPERATURE: 99 F | HEART RATE: 90 BPM | RESPIRATION RATE: 18 BRPM | SYSTOLIC BLOOD PRESSURE: 120 MMHG | BODY MASS INDEX: 28.66 KG/M2 | DIASTOLIC BLOOD PRESSURE: 79 MMHG | OXYGEN SATURATION: 98 %

## 2020-07-13 PROCEDURE — 99214 OFFICE O/P EST MOD 30 MIN: CPT | Performed by: SURGERY

## 2020-07-13 ASSESSMENT — ENCOUNTER SYMPTOMS
SHORTNESS OF BREATH: 0
ALLERGIC/IMMUNOLOGIC NEGATIVE: 1
COLOR CHANGE: 0
CHEST TIGHTNESS: 0
ABDOMINAL PAIN: 0

## 2020-07-13 NOTE — PROGRESS NOTES
Mother     Heart Disease Father     Hearing Loss Brother        Allergies:       Patient has no known allergies. Medications:      Current Outpatient Medications   Medication Sig Dispense Refill    metoprolol succinate (TOPROL XL) 25 MG extended release tablet TAKE ONE TABLET BY MOUTH DAILY 90 tablet 0    famotidine (PEPCID) 20 MG tablet TAKE ONE TABLET BY MOUTH ONCE NIGHTLY 90 tablet 0    Umeclidinium Bromide 62.5 MCG/INH AEPB Inhale 1 puff into the lungs daily 30 each 3    fluocinonide (LIDEX) 0.05 % ointment Apply topically 2 times daily Apply topically 2 times daily.  dextromethorphan-guaiFENesin (MUCINEX DM)  MG per extended release tablet Take 1 tablet by mouth every 12 hours as needed for Cough or Congestion 60 tablet 2    losartan (COZAAR) 50 MG tablet TAKE ONE TABLET BY MOUTH DAILY 90 tablet 0    simvastatin (ZOCOR) 40 MG tablet TAKE ONE TABLET BY MOUTH ONCE NIGHTLY 90 tablet 0    glimepiride (AMARYL) 4 MG tablet TAKE ONE TABLET BY MOUTH EVERY MORNING AT BREAKFAST AND TAKE ONE TABLET BY MOUTH DAILY AT DINNER 180 tablet 0    albuterol sulfate HFA (VENTOLIN HFA) 108 (90 Base) MCG/ACT inhaler Inhale 2 puffs into the lungs every 6 hours as needed for Wheezing 1 Inhaler 3    acetaminophen (TYLENOL) 500 MG tablet Take 500 mg by mouth as needed for Pain      ibuprofen (ADVIL;MOTRIN) 200 MG tablet Take 200 mg by mouth as needed for Pain      ARIPiprazole (ABILIFY) 5 MG tablet       ONETOUCH DELICA LANCETS 15I MISC Check BS bid and prn 100 each 5    ONE TOUCH ULTRA TEST strip USE ONE STRIP TO TEST TWICE A  strip 1    aspirin EC 81 MG EC tablet Take 1 tablet by mouth daily 128 tablet 0    FETZIMA 120 MG CP24 ER capsule Take 120 mg by mouth daily       traZODone (DESYREL) 100 MG tablet Take 100 mg by mouth nightly.  Indications: 2 tabs nightly      mometasone-formoterol (DULERA) 100-5 MCG/ACT inhaler Inhale 2 puffs into the lungs 2 times daily (Patient not taking: Reported on 7/13/2020) 1 Inhaler 0     No current facility-administered medications for this visit. Social History:     Tobacco:    reports that she has been smoking cigarettes. She has a 20.00 pack-year smoking history. She has never used smokeless tobacco.  Alcohol:      reports no history of alcohol use. Drug Use:  reports no history of drug use. Review of Systems:     Review of Systems   Constitutional: Negative for chills and fever. HENT: Negative for congestion. Eyes: Negative for visual disturbance. Respiratory: Negative for chest tightness and shortness of breath. Cardiovascular: Negative for chest pain and leg swelling. Gastrointestinal: Negative for abdominal pain. Endocrine: Negative. Genitourinary: Negative. Musculoskeletal: Positive for arthralgias. Skin: Negative for color change and wound. Allergic/Immunologic: Negative. Neurological: Positive for numbness (neuropathy in her feet). Negative for facial asymmetry, speech difficulty and weakness. Hematological: Negative. Psychiatric/Behavioral: Negative. Physical Exam:     Vitals:  /79 (Site: Right Upper Arm, Position: Sitting, Cuff Size: Large Adult)   Pulse 90   Temp 99 °F (37.2 °C) (Oral)   Resp 18   Ht 5' 5\" (1.651 m)   Wt 172 lb (78 kg)   LMP 01/01/1996   SpO2 98%   BMI 28.62 kg/m²     Physical Exam  Constitutional:       Appearance: She is well-developed and well-groomed. Eyes:      Extraocular Movements: Extraocular movements intact. Conjunctiva/sclera: Conjunctivae normal.   Neck:      Musculoskeletal: Full passive range of motion without pain. Vascular: No carotid bruit. Cardiovascular:      Rate and Rhythm: Normal rate and regular rhythm. Pulses:           Popliteal pulses are 2+ on the right side and 2+ on the left side. Dorsalis pedis pulses are 0 on the right side and 0 on the left side.         Posterior tibial pulses are 0 on the right side and detected w/ Doppler on the left side. Pulmonary:      Effort: Pulmonary effort is normal. No respiratory distress. Abdominal:      Palpations: Abdomen is soft. There is no pulsatile mass. Tenderness: There is no abdominal tenderness. Musculoskeletal:      Right lower leg: She exhibits no tenderness and no swelling. Left lower leg: She exhibits no tenderness and no swelling. Right foot: Normal capillary refill. No tenderness or swelling. Left foot: Normal capillary refill. No tenderness or swelling. Feet:      Right foot:      Skin integrity: No ulcer or skin breakdown. Left foot:      Skin integrity: No ulcer or skin breakdown. Skin:     General: Skin is warm. Capillary Refill: Capillary refill takes less than 2 seconds. Neurological:      Mental Status: She is alert and oriented to person, place, and time. GCS: GCS eye subscore is 4. GCS verbal subscore is 5. GCS motor subscore is 6. Sensory: Sensation is intact. Motor: Motor function is intact. Psychiatric:         Mood and Affect: Mood normal.         Speech: Speech normal.         Behavior: Behavior normal.         Thought Content: Thought content normal.       Imaging/Labs:     Aortic duplex (6/15/2020) reveals patent endograft, left limb occluded, no AAA    Graft duplex reveals elevated velocities just past proximal anastomosis, likely related to acute turn in graft, no evidence of flow limiting plaque on B-mode imaging    Assessment and Plan:     AAA, PAD s/p EVAR with right to left fem-fem bypass  · Continue optimal medical therapy with antiplatelet and statins  · Suspect element of tibial occlusive disease likely related to diabetes causing her neuropathy and PAD  · Continue daily exercise to improve overall cardiovascular health  · Her neuropathy does not seem to be bothering her significantly enough, but if it gets worse can consider starting Neurontin for symptomatic relief.   · Do not suspect true stenosis of proximal fem-fem graft and likely related to acute turn ion graft  · Will continue yearly surveillance of her endograft with aortic duplex and will get ALEXANDRA/PVRs in 1 year for follow up    Electronically signed by Adam Palacios MD on 7/13/20 at 10:31 AM EDT      [address] [place] [address],[address]: [phone]  C: [phone]  Email: [email or web address]

## 2020-09-22 ENCOUNTER — TELEPHONE (OUTPATIENT)
Dept: SPIRITUAL SERVICES | Age: 60
End: 2020-09-22

## 2020-09-22 NOTE — TELEPHONE ENCOUNTER
Called the patient and left a voice mail to call the Spiritual Care office in regards to the ACP documents. I will reach out in a few days to try to contact patient.

## 2020-09-24 ENCOUNTER — TELEPHONE (OUTPATIENT)
Dept: SPIRITUAL SERVICES | Age: 60
End: 2020-09-24

## 2020-09-24 NOTE — TELEPHONE ENCOUNTER
[unfilled]    Advance Care Planning Clinical Specialist  Conversation Note      Date of ACP Conversation: 9/24/2020    [employer] with:  Patient with capacity    *Note - Patient stated that she got ACP documents from another source and that she was not interested in us sending them to her. This referral will be closed at this time.     ACP Clinical Specialist: Christina Sender      Follow-up plan:    [] Schedule follow-up conversation to continue planning  [] Referred individual to Provider for additional questions/concerns   [] Advised patient/agent/surrogate to review completed ACP document and update if needed with changes in condition, patient preferences or care setting    [x] This note routed to one or more involved healthcare providers    [unfilled]

## 2020-11-17 ENCOUNTER — HOSPITAL ENCOUNTER (OUTPATIENT)
Age: 60
Discharge: HOME | End: 2020-11-17
Payer: COMMERCIAL

## 2020-11-17 LAB
-: ABNORMAL
ABSOLUTE EOS #: 0.3 K/UL (ref 0–0.4)
ABSOLUTE IMMATURE GRANULOCYTE: ABNORMAL K/UL (ref 0–0.3)
ABSOLUTE LYMPH #: 2.6 K/UL (ref 1–4.8)
ABSOLUTE MONO #: 0.6 K/UL (ref 0.1–1.3)
AMORPHOUS: ABNORMAL
ANION GAP SERPL CALCULATED.3IONS-SCNC: 10 MMOL/L (ref 9–17)
BACTERIA: ABNORMAL
BASOPHILS # BLD: 1 % (ref 0–2)
BASOPHILS ABSOLUTE: 0.1 K/UL (ref 0–0.2)
BILIRUBIN URINE: NEGATIVE
BUN BLDV-MCNC: 13 MG/DL (ref 8–23)
BUN/CREAT BLD: ABNORMAL (ref 9–20)
CALCIUM SERPL-MCNC: 9 MG/DL (ref 8.6–10.4)
CASTS UA: ABNORMAL /LPF
CHLORIDE BLD-SCNC: 99 MMOL/L (ref 98–107)
CO2: 23 MMOL/L (ref 20–31)
COLOR: YELLOW
COMMENT UA: ABNORMAL
CREAT SERPL-MCNC: 0.94 MG/DL (ref 0.5–0.9)
CRYSTALS, UA: ABNORMAL /HPF
DIFFERENTIAL TYPE: ABNORMAL
EOSINOPHILS RELATIVE PERCENT: 3 % (ref 0–4)
EPITHELIAL CELLS UA: ABNORMAL /HPF
GFR AFRICAN AMERICAN: >60 ML/MIN
GFR NON-AFRICAN AMERICAN: >60 ML/MIN
GFR SERPL CREATININE-BSD FRML MDRD: ABNORMAL ML/MIN/{1.73_M2}
GFR SERPL CREATININE-BSD FRML MDRD: ABNORMAL ML/MIN/{1.73_M2}
GLUCOSE BLD-MCNC: 163 MG/DL (ref 70–99)
GLUCOSE URINE: NEGATIVE
HCT VFR BLD CALC: 43.8 % (ref 36–46)
HEMOGLOBIN: 14.6 G/DL (ref 12–16)
IMMATURE GRANULOCYTES: ABNORMAL %
KETONES, URINE: NEGATIVE
LEUKOCYTE ESTERASE, URINE: NEGATIVE
LYMPHOCYTES # BLD: 25 % (ref 24–44)
MAGNESIUM: 1.9 MG/DL (ref 1.6–2.6)
MCH RBC QN AUTO: 31.1 PG (ref 26–34)
MCHC RBC AUTO-ENTMCNC: 33.3 G/DL (ref 31–37)
MCV RBC AUTO: 93.2 FL (ref 80–100)
MONOCYTES # BLD: 6 % (ref 1–7)
MUCUS: ABNORMAL
NITRITE, URINE: NEGATIVE
NRBC AUTOMATED: ABNORMAL PER 100 WBC
OTHER OBSERVATIONS UA: ABNORMAL
PDW BLD-RTO: 15.2 % (ref 11.5–14.9)
PH UA: 5.5 (ref 5–8)
PHOSPHORUS: 3.7 MG/DL (ref 2.6–4.5)
PLATELET # BLD: 223 K/UL (ref 150–450)
PLATELET ESTIMATE: ABNORMAL
PMV BLD AUTO: 8.2 FL (ref 6–12)
POTASSIUM SERPL-SCNC: 4.6 MMOL/L (ref 3.7–5.3)
PROTEIN UA: ABNORMAL
RBC # BLD: 4.71 M/UL (ref 4–5.2)
RBC # BLD: ABNORMAL 10*6/UL
RBC UA: ABNORMAL /HPF
RENAL EPITHELIAL, UA: ABNORMAL /HPF
SEG NEUTROPHILS: 65 % (ref 36–66)
SEGMENTED NEUTROPHILS ABSOLUTE COUNT: 6.5 K/UL (ref 1.3–9.1)
SODIUM BLD-SCNC: 132 MMOL/L (ref 135–144)
SPECIFIC GRAVITY UA: 1.02 (ref 1–1.03)
TRICHOMONAS: ABNORMAL
TURBIDITY: CLEAR
URINE HGB: NEGATIVE
UROBILINOGEN, URINE: NORMAL
WBC # BLD: 10.1 K/UL (ref 3.5–11)
WBC # BLD: ABNORMAL 10*3/UL
WBC UA: ABNORMAL /HPF
YEAST: ABNORMAL

## 2020-11-17 PROCEDURE — 84100 ASSAY OF PHOSPHORUS: CPT

## 2020-11-17 PROCEDURE — 36415 COLL VENOUS BLD VENIPUNCTURE: CPT

## 2020-11-17 PROCEDURE — 80048 BASIC METABOLIC PNL TOTAL CA: CPT

## 2020-11-17 PROCEDURE — 83735 ASSAY OF MAGNESIUM: CPT

## 2020-11-17 PROCEDURE — 85025 COMPLETE CBC W/AUTO DIFF WBC: CPT

## 2020-11-17 PROCEDURE — 81001 URINALYSIS AUTO W/SCOPE: CPT

## 2020-11-25 PROBLEM — I65.23 BILATERAL CAROTID ARTERY OCCLUSION: Status: ACTIVE | Noted: 2017-07-03

## 2021-01-20 ENCOUNTER — HOSPITAL ENCOUNTER (OUTPATIENT)
Dept: WOMENS IMAGING | Age: 61
Discharge: HOME | End: 2021-01-22
Payer: COMMERCIAL

## 2021-01-20 DIAGNOSIS — Z78.0 POST-MENOPAUSAL: ICD-10-CM

## 2021-01-20 DIAGNOSIS — Z12.31 ENCOUNTER FOR SCREENING MAMMOGRAM FOR BREAST CANCER: ICD-10-CM

## 2021-01-20 PROCEDURE — 77063 BREAST TOMOSYNTHESIS BI: CPT

## 2021-01-20 PROCEDURE — 77080 DXA BONE DENSITY AXIAL: CPT

## 2021-01-29 ENCOUNTER — HOSPITAL ENCOUNTER (OUTPATIENT)
Dept: WOMENS IMAGING | Age: 61
Discharge: HOME | End: 2021-01-31
Payer: COMMERCIAL

## 2021-01-29 DIAGNOSIS — R92.8 ABNORMAL MAMMOGRAM: ICD-10-CM

## 2021-01-29 PROCEDURE — G0279 TOMOSYNTHESIS, MAMMO: HCPCS

## 2021-01-29 PROCEDURE — 76642 ULTRASOUND BREAST LIMITED: CPT

## 2021-08-18 ENCOUNTER — HOSPITAL ENCOUNTER (EMERGENCY)
Age: 61
Discharge: LEFT BEFORE DISCHARGE | End: 2021-08-18
Payer: COMMERCIAL

## 2021-08-18 VITALS
WEIGHT: 168 LBS | DIASTOLIC BLOOD PRESSURE: 74 MMHG | HEIGHT: 66 IN | BODY MASS INDEX: 27 KG/M2 | SYSTOLIC BLOOD PRESSURE: 152 MMHG | RESPIRATION RATE: 17 BRPM | HEART RATE: 108 BPM | OXYGEN SATURATION: 95 % | TEMPERATURE: 98.5 F

## 2021-08-18 ASSESSMENT — PAIN DESCRIPTION - PAIN TYPE: TYPE: ACUTE PAIN

## 2021-08-18 ASSESSMENT — PAIN DESCRIPTION - LOCATION: LOCATION: NECK

## 2021-08-18 ASSESSMENT — PAIN SCALES - GENERAL: PAINLEVEL_OUTOF10: 10

## 2021-08-19 NOTE — ED TRIAGE NOTES
Mode of arrival (squad #, walk in, police, etc) : Walk in        Chief complaint(s): Mass, neck pain        Arrival Note (brief scenario, treatment PTA, etc). : Pt arrives to ED c/o \"bump\" on back of her head and in her neck. Patient reports pain that radiates into her neck. Patient states that the bumps have been there for a couple of days. Patient denies any injury, trauma or fall. C= \"Have you ever felt that you should Cut down on your drinking? \"  No  A= \"Have people Annoyed you by criticizing your drinking? \"  No  G= \"Have you ever felt bad or Guilty about your drinking? \"  No  E= \"Have you ever had a drink as an Eye-opener first thing in the morning to steady your nerves or to help a hangover? \"  No      Deferred []      Reason for deferring: N/A    *If yes to two or more: probable alcohol abuse. *

## 2021-09-07 DIAGNOSIS — I71.40 AAA (ABDOMINAL AORTIC ANEURYSM) WITHOUT RUPTURE: ICD-10-CM

## 2021-09-07 DIAGNOSIS — I73.9 PAD (PERIPHERAL ARTERY DISEASE) (HCC): Primary | ICD-10-CM

## 2021-09-08 ENCOUNTER — HOSPITAL ENCOUNTER (OUTPATIENT)
Dept: VASCULAR LAB | Age: 61
Discharge: HOME | End: 2021-09-08
Payer: COMMERCIAL

## 2021-09-08 DIAGNOSIS — I73.9 PAD (PERIPHERAL ARTERY DISEASE) (HCC): ICD-10-CM

## 2021-09-08 DIAGNOSIS — I71.40 ABDOMINAL AORTIC ANEURYSM (AAA) WITHOUT RUPTURE: ICD-10-CM

## 2021-09-08 PROCEDURE — 93923 UPR/LXTR ART STDY 3+ LVLS: CPT

## 2021-09-08 PROCEDURE — 76706 US ABDL AORTA SCREEN AAA: CPT

## 2021-09-13 ENCOUNTER — OFFICE VISIT (OUTPATIENT)
Dept: VASCULAR SURGERY | Age: 61
End: 2021-09-13
Payer: COMMERCIAL

## 2021-09-13 VITALS
HEIGHT: 66 IN | HEART RATE: 102 BPM | WEIGHT: 168 LBS | RESPIRATION RATE: 18 BRPM | DIASTOLIC BLOOD PRESSURE: 82 MMHG | OXYGEN SATURATION: 97 % | TEMPERATURE: 97 F | BODY MASS INDEX: 27 KG/M2 | SYSTOLIC BLOOD PRESSURE: 152 MMHG

## 2021-09-13 DIAGNOSIS — I71.40 ABDOMINAL AORTIC ANEURYSM (AAA) WITHOUT RUPTURE: ICD-10-CM

## 2021-09-13 DIAGNOSIS — I73.9 PAD (PERIPHERAL ARTERY DISEASE) (HCC): Primary | ICD-10-CM

## 2021-09-13 PROCEDURE — 99214 OFFICE O/P EST MOD 30 MIN: CPT | Performed by: SURGERY

## 2021-09-13 RX ORDER — GABAPENTIN 100 MG/1
100 CAPSULE ORAL 3 TIMES DAILY
Qty: 270 CAPSULE | Refills: 3 | Status: SHIPPED | OUTPATIENT
Start: 2021-09-13 | End: 2021-10-17 | Stop reason: SDUPTHER

## 2021-09-13 NOTE — PROGRESS NOTES
Division of Vascular Surgery        Follow Up      AAA  S/p EVAR, right to left fem-fem bypass    Chief Complaint:      PAD, claudication    History of Present Illness:      Pallavi Decker is a 59 y.o. woman who presents for follow up regarding her peripheral arterial disease and aneurysm. She underwent endovascular repair several years ago and required fem-fem bypass due to occlusion of one of her limbs. She continues to have significant neuropathy in her feet. Her claudication symptoms are mainly on the left side mainly in her hip and thigh. It does not seem to limit her too much, improves with rest.  She also describes arthritis type pain in her left hip. She does continue to smoke. She denies symptoms suggestive of ischemic rest pain, no open wounds or sores on her feet. Denies any severe abdominal or back pain.     Medical History:     Past Medical History:   Diagnosis Date    AA (aortic aneurysm) ([facility])     MILD SUPRA-RENAL    Anxiety     Blood clot in vein     Bronchitis     Cervical cancer ([facility])     Cervix no chemo or radiation    CKD (chronic kidney disease) stage 3, GFR 30-59 ml/min ([facility])     Depression     Hx of blood clots     Hyperlipidemia     Hyperlipidemia     Hypertension     Nephrolith     Peripheral vascular disease ([facility]     Prediabetes     Suicide attempt ([facility]     2012 x2 with pills       Surgical History:     Past Surgical History:   Procedure Laterality Date    ANGIOPLASTY      RT. FEMORAL ARTERY/BILATERAL ILIAC ARTERY    CATARACT REMOVAL  09/26/2019    CATARACT REMOVAL WITH IMPLANT  09/12/2019    CHOLECYSTECTOMY      EMBOLECTOMY  Oct 16 2013     BILATERAL ILIAC    HYSTERECTOMY  1995    TOTAL/CERVICAL CANCER    KNEE ARTHROSCOPY      TOE AMPUTATION Left 12/21/15    5TH PARTIAL-baby toe    TUBAL LIGATION      VASCULAR SURGERY      fem-fem       Family History:     Family History   Problem Relation Age of Onset    Diabetes Brother     High Blood Pressure has been smoking cigarettes. She has a 30.00 pack-year smoking history. She has never used smokeless tobacco.  Alcohol:      reports no history of alcohol use. Drug Use:  reports no history of drug use. Review of Systems:     Review of Systems   Constitutional: Negative for chills and fever. HENT: Negative for congestion. Eyes: Negative for visual disturbance. Respiratory: Negative for chest tightness and shortness of breath. Cardiovascular: Negative for chest pain and leg swelling. Gastrointestinal: Negative for abdominal pain. Endocrine: Negative. Genitourinary: Negative. Musculoskeletal: Positive for arthralgias. Negative for back pain. Skin: Negative for color change and wound. Allergic/Immunologic: Negative. Neurological: Positive for numbness (neuropathy in both feet). Negative for facial asymmetry, speech difficulty and weakness. Hematological: Negative. Psychiatric/Behavioral: Negative. Physical Exam:     Vitals:  BP (!) 152/82 (Site: Left Upper Arm, Position: Sitting, Cuff Size: Large Adult) Comment: Manual  Pulse 102   Temp 97 °F (36.1 °C) (Temporal)   Resp 18   Ht 5' 6\" (1.676 m)   Wt 168 lb (76.2 kg)   LMP 01/01/1996   SpO2 97%   BMI 27.12 kg/m²     Physical Exam  Constitutional:       Appearance: She is well-developed and well-groomed. Eyes:      Extraocular Movements: Extraocular movements intact. Conjunctiva/sclera: Conjunctivae normal.   Neck:      Vascular: No carotid bruit. Cardiovascular:      Rate and Rhythm: Normal rate and regular rhythm. Pulses:           Dorsalis pedis pulses are detected w/ Doppler on the right side and detected w/ Doppler on the left side. Posterior tibial pulses are detected w/ Doppler on the right side and detected w/ Doppler on the left side. Pulmonary:      Effort: Pulmonary effort is normal. No respiratory distress. Abdominal:      Palpations: Abdomen is soft. There is no pulsatile mass. Tenderness: There is no abdominal tenderness. Musculoskeletal:      Cervical back: Full passive range of motion without pain. Right lower leg: No swelling or tenderness. No edema. Left lower leg: No swelling or tenderness. No edema. Right foot: Normal capillary refill. No swelling or tenderness. Left foot: Normal capillary refill. No swelling or tenderness. Feet:      Right foot:      Skin integrity: No ulcer or skin breakdown. Left foot:      Skin integrity: No ulcer or skin breakdown. Skin:     General: Skin is warm. Capillary Refill: Capillary refill takes less than 2 seconds. Neurological:      Mental Status: She is alert and oriented to person, place, and time. GCS: GCS eye subscore is 4. GCS verbal subscore is 5. GCS motor subscore is 6. Sensory: Sensation is intact. Motor: Motor function is intact. Psychiatric:         Mood and Affect: Mood normal.         Speech: Speech normal.         Behavior: Behavior normal.       Imaging/Labs:             Assessment and Plan:     AAA, PAD  · Optimal medical therapy with aspirin and statins  · Daily exercise to improve overall cardiovascular health  · Needs to work on quitting smoking  · Next surveillance at 6 months with CTA aorta with runoff to evaluate extent of PAD and evaluate previous work, sooner if symptoms get worse  · Will start her on Neurontin to see if it helps with her neuropathy    Electronically signed by Lamar Burt MD on 9/13/21 at 9:53 AM [address]: [phone]  C: [phone]  Email: [email or web address]

## 2021-09-20 ASSESSMENT — ENCOUNTER SYMPTOMS
SHORTNESS OF BREATH: 0
ALLERGIC/IMMUNOLOGIC NEGATIVE: 1
CHEST TIGHTNESS: 0
ABDOMINAL PAIN: 0
COLOR CHANGE: 0
BACK PAIN: 0

## 2021-09-27 ENCOUNTER — HOSPITAL ENCOUNTER (OUTPATIENT)
Age: 61
Discharge: HOME | End: 2021-09-27
Payer: COMMERCIAL

## 2021-09-27 DIAGNOSIS — I12.9 BENIGN HYPERTENSION WITH CKD (CHRONIC KIDNEY DISEASE), STAGE II: ICD-10-CM

## 2021-09-27 DIAGNOSIS — N18.30 SECONDARY DIABETES MELLITUS WITH STAGE 3 CHRONIC KIDNEY DISEASE (HCC): ICD-10-CM

## 2021-09-27 DIAGNOSIS — E13.22 SECONDARY DIABETES MELLITUS WITH STAGE 3 CHRONIC KIDNEY DISEASE (HCC): ICD-10-CM

## 2021-09-27 DIAGNOSIS — E13.22 SECONDARY DIABETES MELLITUS WITH STAGE 2 CHRONIC KIDNEY DISEASE (HCC): ICD-10-CM

## 2021-09-27 DIAGNOSIS — N18.2 BENIGN HYPERTENSION WITH CKD (CHRONIC KIDNEY DISEASE), STAGE II: ICD-10-CM

## 2021-09-27 DIAGNOSIS — N18.2 CKD (CHRONIC KIDNEY DISEASE), STAGE II: ICD-10-CM

## 2021-09-27 DIAGNOSIS — N18.2 SECONDARY DIABETES MELLITUS WITH STAGE 2 CHRONIC KIDNEY DISEASE (HCC): ICD-10-CM

## 2021-09-27 DIAGNOSIS — I10 ESSENTIAL HYPERTENSION: ICD-10-CM

## 2021-09-27 DIAGNOSIS — I73.9 PERIPHERAL VASCULAR DISEASE, UNSPECIFIED (HCC): ICD-10-CM

## 2021-09-27 DIAGNOSIS — N20.0 KIDNEY STONE: ICD-10-CM

## 2021-09-27 LAB
-: NORMAL
ABSOLUTE EOS #: 0.2 K/UL (ref 0–0.4)
ABSOLUTE IMMATURE GRANULOCYTE: ABNORMAL K/UL (ref 0–0.3)
ABSOLUTE LYMPH #: 2.5 K/UL (ref 1–4.8)
ABSOLUTE MONO #: 0.7 K/UL (ref 0.1–1.3)
ALBUMIN SERPL-MCNC: 4.2 G/DL (ref 3.5–5.2)
ALBUMIN/GLOBULIN RATIO: ABNORMAL (ref 1–2.5)
ALP BLD-CCNC: 106 U/L (ref 35–104)
ALT SERPL-CCNC: 14 U/L (ref 5–33)
AMORPHOUS: NORMAL
ANION GAP SERPL CALCULATED.3IONS-SCNC: 12 MMOL/L (ref 9–17)
AST SERPL-CCNC: 14 U/L
BACTERIA: NORMAL
BASOPHILS # BLD: 1 % (ref 0–2)
BASOPHILS ABSOLUTE: 0.1 K/UL (ref 0–0.2)
BILIRUB SERPL-MCNC: 0.15 MG/DL (ref 0.3–1.2)
BILIRUBIN DIRECT: 0.08 MG/DL
BILIRUBIN URINE: NEGATIVE
BILIRUBIN, INDIRECT: 0.07 MG/DL (ref 0–1)
BUN BLDV-MCNC: 18 MG/DL (ref 8–23)
BUN/CREAT BLD: ABNORMAL (ref 9–20)
CALCIUM SERPL-MCNC: 9.7 MG/DL (ref 8.6–10.4)
CASTS UA: NORMAL /LPF
CHLORIDE BLD-SCNC: 102 MMOL/L (ref 98–107)
CHOLESTEROL/HDL RATIO: 5
CHOLESTEROL: 150 MG/DL
CO2: 23 MMOL/L (ref 20–31)
COLOR: YELLOW
COMMENT UA: ABNORMAL
CREAT SERPL-MCNC: 0.94 MG/DL (ref 0.5–0.9)
CRYSTALS, UA: NORMAL /HPF
DIFFERENTIAL TYPE: ABNORMAL
EOSINOPHILS RELATIVE PERCENT: 3 % (ref 0–4)
EPITHELIAL CELLS UA: NORMAL /HPF
GFR AFRICAN AMERICAN: >60 ML/MIN
GFR NON-AFRICAN AMERICAN: >60 ML/MIN
GFR SERPL CREATININE-BSD FRML MDRD: ABNORMAL ML/MIN/{1.73_M2}
GFR SERPL CREATININE-BSD FRML MDRD: ABNORMAL ML/MIN/{1.73_M2}
GLOBULIN: ABNORMAL G/DL (ref 1.5–3.8)
GLUCOSE BLD-MCNC: 105 MG/DL (ref 70–99)
GLUCOSE URINE: NEGATIVE
HCT VFR BLD CALC: 41.3 % (ref 36–46)
HDLC SERPL-MCNC: 30 MG/DL
HEMOGLOBIN: 13.8 G/DL (ref 12–16)
IMMATURE GRANULOCYTES: ABNORMAL %
KETONES, URINE: NEGATIVE
LDL CHOLESTEROL: 70 MG/DL (ref 0–130)
LEUKOCYTE ESTERASE, URINE: NEGATIVE
LYMPHOCYTES # BLD: 28 % (ref 24–44)
MAGNESIUM: 2.1 MG/DL (ref 1.6–2.6)
MCH RBC QN AUTO: 30.8 PG (ref 26–34)
MCHC RBC AUTO-ENTMCNC: 33.4 G/DL (ref 31–37)
MCV RBC AUTO: 92.2 FL (ref 80–100)
MONOCYTES # BLD: 8 % (ref 1–7)
MUCUS: NORMAL
NITRITE, URINE: NEGATIVE
NRBC AUTOMATED: ABNORMAL PER 100 WBC
OTHER OBSERVATIONS UA: NORMAL
PDW BLD-RTO: 14.9 % (ref 11.5–14.9)
PH UA: 5.5 (ref 5–8)
PHOSPHORUS: 3.2 MG/DL (ref 2.6–4.5)
PLATELET # BLD: 238 K/UL (ref 150–450)
PLATELET ESTIMATE: ABNORMAL
PMV BLD AUTO: 7.9 FL (ref 6–12)
POTASSIUM SERPL-SCNC: 4.3 MMOL/L (ref 3.7–5.3)
PROTEIN UA: ABNORMAL
RBC # BLD: 4.48 M/UL (ref 4–5.2)
RBC # BLD: ABNORMAL 10*6/UL
RBC UA: NORMAL /HPF
RENAL EPITHELIAL, UA: NORMAL /HPF
SEG NEUTROPHILS: 60 % (ref 36–66)
SEGMENTED NEUTROPHILS ABSOLUTE COUNT: 5.4 K/UL (ref 1.3–9.1)
SODIUM BLD-SCNC: 137 MMOL/L (ref 135–144)
SPECIFIC GRAVITY UA: 1.01 (ref 1–1.03)
TOTAL PROTEIN: 7.9 G/DL (ref 6.4–8.3)
TRICHOMONAS: NORMAL
TRIGL SERPL-MCNC: 250 MG/DL
TURBIDITY: CLEAR
URINE HGB: NEGATIVE
UROBILINOGEN, URINE: NORMAL
VLDLC SERPL CALC-MCNC: ABNORMAL MG/DL (ref 1–30)
WBC # BLD: 8.9 K/UL (ref 3.5–11)
WBC # BLD: ABNORMAL 10*3/UL
WBC UA: NORMAL /HPF
YEAST: NORMAL

## 2021-09-27 PROCEDURE — 81001 URINALYSIS AUTO W/SCOPE: CPT

## 2021-09-27 PROCEDURE — 80048 BASIC METABOLIC PNL TOTAL CA: CPT

## 2021-09-27 PROCEDURE — 85025 COMPLETE CBC W/AUTO DIFF WBC: CPT

## 2021-09-27 PROCEDURE — 36415 COLL VENOUS BLD VENIPUNCTURE: CPT

## 2021-09-27 PROCEDURE — 83735 ASSAY OF MAGNESIUM: CPT

## 2021-09-27 PROCEDURE — 80076 HEPATIC FUNCTION PANEL: CPT

## 2021-09-27 PROCEDURE — 84100 ASSAY OF PHOSPHORUS: CPT

## 2021-09-27 PROCEDURE — 80061 LIPID PANEL: CPT

## 2021-10-07 PROBLEM — I65.23 BILATERAL CAROTID ARTERY STENOSIS: Status: ACTIVE | Noted: 2017-07-03

## 2021-11-13 ENCOUNTER — HOSPITAL ENCOUNTER (EMERGENCY)
Age: 61
Discharge: HOME | End: 2021-11-13
Attending: EMERGENCY MEDICINE
Payer: COMMERCIAL

## 2021-11-13 VITALS
BODY MASS INDEX: 27 KG/M2 | RESPIRATION RATE: 16 BRPM | HEART RATE: 95 BPM | SYSTOLIC BLOOD PRESSURE: 136 MMHG | WEIGHT: 168 LBS | HEIGHT: 66 IN | DIASTOLIC BLOOD PRESSURE: 56 MMHG | OXYGEN SATURATION: 98 % | TEMPERATURE: 98.2 F

## 2021-11-13 DIAGNOSIS — R59.0 SUBMANDIBULAR LYMPHADENOPATHY: Primary | ICD-10-CM

## 2021-11-13 PROCEDURE — 99283 EMERGENCY DEPT VISIT LOW MDM: CPT

## 2021-11-13 ASSESSMENT — ENCOUNTER SYMPTOMS
DIARRHEA: 0
SORE THROAT: 0
SHORTNESS OF BREATH: 0
VOMITING: 0
COUGH: 0
ABDOMINAL PAIN: 0
CONSTIPATION: 0
NAUSEA: 0

## 2021-11-13 ASSESSMENT — PAIN DESCRIPTION - LOCATION: LOCATION: THROAT

## 2021-11-13 ASSESSMENT — PAIN DESCRIPTION - PAIN TYPE: TYPE: ACUTE PAIN

## 2021-11-13 ASSESSMENT — PAIN SCALES - GENERAL: PAINLEVEL_OUTOF10: 7

## 2021-11-13 NOTE — ED TRIAGE NOTES
Mode of arrival (squad #, walk in, police, etc) : Walk in        Chief complaint(s): Lump on mass, sore throat        Arrival Note (brief scenario, treatment PTA, etc). : Pt arrives to ED c/o sore throat and mass on throat. Patient states that she went to an urgent care on the 28th and started on seven days of antibiotics. Patient states that the mass has gone down but it is still there and she still has pain. C= \"Have you ever felt that you should Cut down on your drinking? \"  No  A= \"Have people Annoyed you by criticizing your drinking? \"  No  G= \"Have you ever felt bad or Guilty about your drinking? \"  No  E= \"Have you ever had a drink as an Eye-opener first thing in the morning to steady your nerves or to help a hangover? \"  No      Deferred []      Reason for deferring: N/A    *If yes to two or more: probable alcohol abuse. *

## 2021-11-13 NOTE — ED PROVIDER NOTES
[address]  eMERGENCY dEPARTMENT eNCOUnter      Pt Name: Donte Thrasher  MRN: [id number]  [place] 1960  Date of evaluation: 11/13/21      CHIEF COMPLAINT       Chief Complaint   Patient presents with    Pharyngitis    Mass         HISTORY OF PRESENT ILLNESS    Donte Thrasher is a 59 y.o. female who presents complaining of mass. Location/Symptom: Left submandibular mass  Timing/Onset: 3 weeks  Context/Setting: Patient was having ear problems went to urgent care was treated with antibiotics for an ear infection and noticed that she had swelling under her jaw on the left. Patient states that she finished antibiotics otherwise feels well but the mass has not gone down and she cannot get into her family doctor for another 2 months  Quality: Aching  Duration: Constant  Modifying Factors: Nothing  Severity: Moderate      REVIEW OF SYSTEMS       Review of Systems   Constitutional: Negative for chills and fever. HENT: Negative for congestion, ear pain and sore throat. Swelling under the left jaw   Respiratory: Negative for cough and shortness of breath. Cardiovascular: Negative for chest pain and palpitations. Gastrointestinal: Negative for abdominal pain, constipation, diarrhea, nausea and vomiting. Neurological: Negative for dizziness, seizures and headaches.        PAST MEDICAL HISTORY     Past Medical History:   Diagnosis Date    AA (aortic aneurysm) (HCC)     MILD SUPRA-RENAL    Anxiety     Blood clot in vein     Bronchitis     Cervical cancer (HCC)     Cervix no chemo or radiation    CKD (chronic kidney disease) stage 3, GFR 30-59 ml/min (HCC)     Depression     Hx of blood clots     Hyperlipidemia     Hyperlipidemia     Hypertension     Nephrolith     Peripheral vascular disease ([facility]     Prediabetes     Suicide attempt ([facility]     2012 x2 with pills       SURGICAL HISTORY       Past Surgical History:   Procedure Laterality Date    ANGIOPLASTY      RT. FEMORAL ARTERY/BILATERAL ILIAC ARTERY    CATARACT REMOVAL  09/26/2019    CATARACT REMOVAL WITH IMPLANT  09/12/2019    CHOLECYSTECTOMY      EMBOLECTOMY  Oct 16 2013     BILATERAL ILIAC    HYSTERECTOMY  1995    TOTAL/CERVICAL CANCER    KNEE ARTHROSCOPY      TOE AMPUTATION Left 12/21/15    5TH PARTIAL-baby toe    TUBAL LIGATION      VASCULAR SURGERY      fem-fem       CURRENT MEDICATIONS       Previous Medications    ACETAMINOPHEN (TYLENOL) 500 MG TABLET    Take 500 mg by mouth as needed for Pain    ARIPIPRAZOLE (ABILIFY) 10 MG TABLET    aripiprazole 10 mg tablet    ARIPIPRAZOLE (ABILIFY) 5 MG TABLET        ASPIRIN 325 MG TABLET    every 24 hours    ASPIRIN EC 81 MG EC TABLET    Take 1 tablet by mouth daily    FAMOTIDINE (PEPCID) 20 MG TABLET    TAKE ONE TABLET BY MOUTH ONCE NIGHTLY    FERROUS SULFATE (IRON 325) 325 (65 FE) MG TABLET    ferrous sulfate 325 mg (65 mg iron) tablet    FETZIMA 120 MG CP24 ER CAPSULE    Take 120 mg by mouth daily     FLUOCINONIDE (LIDEX) 0.05 % OINTMENT    Apply topically 2 times daily Apply topically 2 times daily. GABAPENTIN (NEURONTIN) 100 MG CAPSULE    Take 1 capsule by mouth 3 times daily for 180 days. GLIMEPIRIDE (AMARYL) 4 MG TABLET    TAKE ONE TABLET BY MOUTH EVERY MORNING AT BREAKFAST AND TAKE ONE TABLET BY MOUTH DAILY WITH DINNER    INCRUSE ELLIPTA 62.5 MCG/INH AEPB    INHALE ONE PUFF BY MOUTH DAILY    LOSARTAN (COZAAR) 50 MG TABLET    TAKE ONE TABLET BY MOUTH DAILY    METOPROLOL SUCCINATE (TOPROL XL) 25 MG EXTENDED RELEASE TABLET    TAKE ONE TABLET BY MOUTH DAILY    ONE TOUCH ULTRA TEST STRIP    USE ONE STRIP TO TEST TWICE A DAY    ONETOUCH DELICA LANCETS 72W MISC    Check BS bid and prn    SIMVASTATIN (ZOCOR) 40 MG TABLET    TAKE ONE TABLET BY MOUTH ONCE NIGHTLY    TRAZODONE (DESYREL) 100 MG TABLET    Take 100 mg by mouth nightly. Indications: 2 tabs nightly       ALLERGIES     has No Known Allergies. SOCIAL HISTORY      reports that she has been smoking cigarettes. She has a 30.00 pack-year smoking history. She has never used smokeless tobacco. She reports that she does not drink alcohol and does not use drugs. PHYSICAL EXAM     INITIAL VITALS: BP (!) 136/56   Pulse 95   Temp 98.2 °F (36.8 °C) (Oral)   Resp 16   Ht 5' 6\" (1.676 m)   Wt 168 lb (76.2 kg)   LMP 01/01/1996   SpO2 98%   BMI 27.12 kg/m²      Physical Exam  Vitals and nursing note reviewed. Constitutional:       General: She is not in acute distress. Appearance: She is well-developed. She is not diaphoretic. HENT:      Head: Normocephalic and atraumatic. Eyes:      General: No scleral icterus. Right eye: No discharge. Left eye: No discharge. Conjunctiva/sclera: Conjunctivae normal.      Pupils: Pupils are equal, round, and reactive to light. Neck:      Comments: 1-1/2 cm fixed mass left submandibular  Pulmonary:      Effort: Pulmonary effort is normal. No respiratory distress. Skin:     General: Skin is warm and dry. Coloration: Skin is not pale. Findings: No erythema or rash. Neurological:      Mental Status: She is alert and oriented to person, place, and time. Psychiatric:         Behavior: Behavior normal.         Thought Content: Thought content normal.         Judgment: Judgment normal.         DIAGNOSTIC RESULTS     RADIOLOGY:All plain film, CT,MRI, and formal ultrasound images (except ED bedside ultrasound) are read by the radiologist and the interpretations are directly viewed by the emergency physician. LABS: All lab results were reviewed by myself, and all abnormals are listed below. Labs Reviewed - No data to display      MEDICAL DECISION MAKING:     At this point time I do not see any signs of infection and she was already on antibiotics I think this is a swollen lymph node possibly a submandibular salivary gland blocks but it looks more like a lymph node and will have her get an ultrasound.       EMERGENCY DEPARTMENT COURSE:   Vitals: Vitals:    11/13/21 1103   BP: (!) 136/56   Pulse: 95   Resp: 16   Temp: 98.2 °F (36.8 °C)   TempSrc: Oral   SpO2: 98%   Weight: 168 lb (76.2 kg)   Height: 5' 6\" (1.676 m)       The patient was given the following medications while in the emergency department:  No orders of the defined types were placed in this encounter. -------------------------      CONSULTS:  None    PROCEDURES:  None    FINAL IMPRESSION      1.  Submandibular lymphadenopathy          DISPOSITION/PLAN   DISPOSITION Decision To Discharge 11/13/2021 11:17:37 AM      PATIENT REFERREDTO:  STELLA Talavera - CNP  [address] [place] [address]  [phone]    In 1 week      [facility] ED  [place] [id number] [phone]    If symptoms worsen      DISCHARGEMEDICATIONS:  New Prescriptions    No medications on file       (Please note that portions of this note were completed with a voice recognition program.  Efforts were made to edit thedictations but occasionally words are mis-transcribed.)    Henna Atkinson MD  Attending Emergency Physician                        Henna Atkinson MD  11/13/21 0382

## 2021-11-13 NOTE — ED NOTES
Pt complains of sore throat, runny nose. Pt has noted swelling in the left cervical lymphnode. She states she was in the ER x1 month ago and was placed on antibiotics for the same issue.       Liya Patel RN  11/13/21 5902

## 2021-11-19 ENCOUNTER — HOSPITAL ENCOUNTER (OUTPATIENT)
Dept: ULTRASOUND IMAGING | Age: 61
Discharge: HOME | End: 2021-11-21
Payer: COMMERCIAL

## 2021-11-19 DIAGNOSIS — R59.0 SUBMANDIBULAR LYMPHADENOPATHY: ICD-10-CM

## 2021-11-19 PROCEDURE — 76536 US EXAM OF HEAD AND NECK: CPT

## 2021-12-08 ENCOUNTER — HOSPITAL ENCOUNTER (OUTPATIENT)
Dept: CT IMAGING | Age: 61
Discharge: HOME | End: 2021-12-10
Payer: COMMERCIAL

## 2021-12-08 DIAGNOSIS — R22.0 MASS OF LEFT SUBMANDIBULAR REGION: ICD-10-CM

## 2021-12-08 DIAGNOSIS — N18.30 STAGE 3 CHRONIC KIDNEY DISEASE, UNSPECIFIED WHETHER STAGE 3A OR 3B CKD (HCC): ICD-10-CM

## 2021-12-08 DIAGNOSIS — R93.89 ABNORMAL ULTRASOUND OF NECK: ICD-10-CM

## 2021-12-08 DIAGNOSIS — R22.1 NECK MASS: ICD-10-CM

## 2021-12-08 PROCEDURE — 70490 CT SOFT TISSUE NECK W/O DYE: CPT

## 2022-03-04 ENCOUNTER — TELEPHONE (OUTPATIENT)
Dept: VASCULAR SURGERY | Age: 62
End: 2022-03-04

## 2022-03-04 NOTE — TELEPHONE ENCOUNTER
The patient called in regards to her test that is scheduled 3/11 saying that they want a clearance from her nephrologist before the test.    The writer called her nephrologist's Dr. Booker Fernandes office in regards to this and the assistant there stated that she would get an encounter sent out to him and let us know when this is completed or if they will give clearance.

## 2022-03-11 ENCOUNTER — HOSPITAL ENCOUNTER (OUTPATIENT)
Age: 62
Discharge: HOME | End: 2022-03-11
Payer: COMMERCIAL

## 2022-03-11 ENCOUNTER — HOSPITAL ENCOUNTER (OUTPATIENT)
Dept: CT IMAGING | Age: 62
Discharge: HOME | End: 2022-03-13
Payer: COMMERCIAL

## 2022-03-11 DIAGNOSIS — Z11.4 ENCOUNTER FOR SCREENING FOR HIV: ICD-10-CM

## 2022-03-11 DIAGNOSIS — I10 ESSENTIAL HYPERTENSION: ICD-10-CM

## 2022-03-11 DIAGNOSIS — E55.9 VITAMIN D INSUFFICIENCY: ICD-10-CM

## 2022-03-11 DIAGNOSIS — I73.9 PAD (PERIPHERAL ARTERY DISEASE) (HCC): ICD-10-CM

## 2022-03-11 DIAGNOSIS — I65.23 BILATERAL CAROTID ARTERY OCCLUSION: ICD-10-CM

## 2022-03-11 DIAGNOSIS — I65.23 BILATERAL CAROTID ARTERY STENOSIS: ICD-10-CM

## 2022-03-11 DIAGNOSIS — R22.0 MASS OF LEFT SUBMANDIBULAR REGION: ICD-10-CM

## 2022-03-11 DIAGNOSIS — Z13.29 THYROID DISORDER SCREENING: ICD-10-CM

## 2022-03-11 DIAGNOSIS — F17.200 SMOKER: ICD-10-CM

## 2022-03-11 DIAGNOSIS — E78.5 HYPERLIPIDEMIA, UNSPECIFIED HYPERLIPIDEMIA TYPE: ICD-10-CM

## 2022-03-11 DIAGNOSIS — I71.40 ABDOMINAL AORTIC ANEURYSM (AAA) WITHOUT RUPTURE: ICD-10-CM

## 2022-03-11 DIAGNOSIS — E13.22 SECONDARY DIABETES MELLITUS WITH STAGE 3 CHRONIC KIDNEY DISEASE (HCC): ICD-10-CM

## 2022-03-11 DIAGNOSIS — K21.9 GASTROESOPHAGEAL REFLUX DISEASE WITHOUT ESOPHAGITIS: ICD-10-CM

## 2022-03-11 DIAGNOSIS — Z11.59 ENCOUNTER FOR HEPATITIS C SCREENING TEST FOR LOW RISK PATIENT: ICD-10-CM

## 2022-03-11 DIAGNOSIS — N18.30 SECONDARY DIABETES MELLITUS WITH STAGE 3 CHRONIC KIDNEY DISEASE (HCC): ICD-10-CM

## 2022-03-11 DIAGNOSIS — N18.30 STAGE 3 CHRONIC KIDNEY DISEASE, UNSPECIFIED WHETHER STAGE 3A OR 3B CKD (HCC): ICD-10-CM

## 2022-03-11 LAB
ABSOLUTE EOS #: 0.2 K/UL (ref 0–0.4)
ABSOLUTE LYMPH #: 2.5 K/UL (ref 1–4.8)
ABSOLUTE MONO #: 0.9 K/UL (ref 0.1–1.3)
ALBUMIN SERPL-MCNC: 4.4 G/DL (ref 3.5–5.2)
ALP BLD-CCNC: 111 U/L (ref 35–104)
ALT SERPL-CCNC: 13 U/L (ref 5–33)
ANION GAP SERPL CALCULATED.3IONS-SCNC: 12 MMOL/L (ref 9–17)
AST SERPL-CCNC: 17 U/L
BASOPHILS # BLD: 1 % (ref 0–2)
BASOPHILS ABSOLUTE: 0.1 K/UL (ref 0–0.2)
BILIRUB SERPL-MCNC: 0.18 MG/DL (ref 0.3–1.2)
BUN BLDV-MCNC: 19 MG/DL (ref 8–23)
CALCIUM SERPL-MCNC: 9.4 MG/DL (ref 8.6–10.4)
CHLORIDE BLD-SCNC: 99 MMOL/L (ref 98–107)
CHOLESTEROL, FASTING: 160 MG/DL
CHOLESTEROL/HDL RATIO: 5
CO2: 24 MMOL/L (ref 20–31)
CREAT SERPL-MCNC: 1.02 MG/DL (ref 0.5–0.9)
EOSINOPHILS RELATIVE PERCENT: 3 % (ref 0–4)
GFR AFRICAN AMERICAN: >60 ML/MIN
GFR NON-AFRICAN AMERICAN: 55 ML/MIN
GFR NON-AFRICAN AMERICAN: 59 ML/MIN
GFR SERPL CREATININE-BSD FRML MDRD: >60 ML/MIN
GFR SERPL CREATININE-BSD FRML MDRD: ABNORMAL ML/MIN/{1.73_M2}
GFR SERPL CREATININE-BSD FRML MDRD: ABNORMAL ML/MIN/{1.73_M2}
GLUCOSE BLD-MCNC: 108 MG/DL (ref 70–99)
HCT VFR BLD CALC: 43.2 % (ref 36–46)
HDLC SERPL-MCNC: 32 MG/DL
HEMOGLOBIN: 14.7 G/DL (ref 12–16)
HEPATITIS C ANTIBODY: NONREACTIVE
HIV AG/AB: NONREACTIVE
LDL CHOLESTEROL: 67 MG/DL (ref 0–130)
LYMPHOCYTES # BLD: 29 % (ref 24–44)
MCH RBC QN AUTO: 30.8 PG (ref 26–34)
MCHC RBC AUTO-ENTMCNC: 34 G/DL (ref 31–37)
MCV RBC AUTO: 90.8 FL (ref 80–100)
MONOCYTES # BLD: 10 % (ref 1–7)
PDW BLD-RTO: 14.6 % (ref 11.5–14.9)
PLATELET # BLD: 216 K/UL (ref 150–450)
PMV BLD AUTO: 8.7 FL (ref 6–12)
POC CREATININE: 0.95 MG/DL (ref 0.51–1.19)
POTASSIUM SERPL-SCNC: 5 MMOL/L (ref 3.7–5.3)
RBC # BLD: 4.75 M/UL (ref 4–5.2)
SEG NEUTROPHILS: 57 % (ref 36–66)
SEGMENTED NEUTROPHILS ABSOLUTE COUNT: 4.9 K/UL (ref 1.3–9.1)
SODIUM BLD-SCNC: 135 MMOL/L (ref 135–144)
TOTAL PROTEIN: 7.2 G/DL (ref 6.4–8.3)
TRIGLYCERIDE, FASTING: 304 MG/DL
TSH SERPL DL<=0.05 MIU/L-ACNC: 2.46 MIU/L (ref 0.3–5)
VITAMIN D 25-HYDROXY: 12.3 NG/ML
WBC # BLD: 8.6 K/UL (ref 3.5–11)

## 2022-03-11 PROCEDURE — 80053 COMPREHEN METABOLIC PANEL: CPT

## 2022-03-11 PROCEDURE — 75635 CT ANGIO ABDOMINAL ARTERIES: CPT

## 2022-03-11 PROCEDURE — 85025 COMPLETE CBC W/AUTO DIFF WBC: CPT

## 2022-03-11 PROCEDURE — 2580000003 HC RX 258: Performed by: SURGERY

## 2022-03-11 PROCEDURE — 84443 ASSAY THYROID STIM HORMONE: CPT

## 2022-03-11 PROCEDURE — 80061 LIPID PANEL: CPT

## 2022-03-11 PROCEDURE — 86803 HEPATITIS C AB TEST: CPT

## 2022-03-11 PROCEDURE — 6360000004 HC RX CONTRAST MEDICATION: Performed by: SURGERY

## 2022-03-11 PROCEDURE — 82565 ASSAY OF CREATININE: CPT

## 2022-03-11 PROCEDURE — 87389 HIV-1 AG W/HIV-1&-2 AB AG IA: CPT

## 2022-03-11 PROCEDURE — 36415 COLL VENOUS BLD VENIPUNCTURE: CPT

## 2022-03-11 PROCEDURE — 82306 VITAMIN D 25 HYDROXY: CPT

## 2022-03-11 RX ORDER — 0.9 % SODIUM CHLORIDE 0.9 %
80 INTRAVENOUS SOLUTION INTRAVENOUS ONCE
Status: COMPLETED | OUTPATIENT
Start: 2022-03-11 | End: 2022-03-11

## 2022-03-11 RX ORDER — SODIUM CHLORIDE 0.9 % (FLUSH) 0.9 %
10 SYRINGE (ML) INJECTION PRN
Status: DISCONTINUED | OUTPATIENT
Start: 2022-03-11 | End: 2022-03-14 | Stop reason: HOSPADM

## 2022-03-11 RX ADMIN — SODIUM CHLORIDE, PRESERVATIVE FREE 10 ML: 5 INJECTION INTRAVENOUS at 10:03

## 2022-03-11 RX ADMIN — IOPAMIDOL 100 ML: 755 INJECTION, SOLUTION INTRAVENOUS at 10:02

## 2022-03-11 RX ADMIN — SODIUM CHLORIDE 80 ML: 9 INJECTION, SOLUTION INTRAVENOUS at 10:03

## 2022-03-14 ENCOUNTER — TELEPHONE (OUTPATIENT)
Dept: VASCULAR SURGERY | Age: 62
End: 2022-03-14

## 2022-03-14 NOTE — TELEPHONE ENCOUNTER
[facility] stating appointment today ahs been cancelled due to Dr Winslow Nurse having a emergency.

## 2022-04-19 ENCOUNTER — TELEPHONE (OUTPATIENT)
Dept: ONCOLOGY | Age: 62
End: 2022-04-19

## 2022-04-19 NOTE — LETTER
4/19/2022        Brianna Shahid  [address]    Dear Brianna Shahid:    Your healthcare provider has ordered a low dose CT scan of the chest for lung cancer screening. You will find enclosed, information about CT lung screening. Please review the statement of understanding, you will be asked to sign a copy of this at the time of your CT scan    If you have not already been contacted to make the appointment for your scan, please call our scheduling department at [phone]    Keep in mind that CT lung screening does not take the place of smoking cessation. If you are a current smoker, you will find enclosed smoking cessation resources. Please do not hesitate to contact me if you have any questions or concerns.     [facility],      [facility] Screening [facility]  [phone]

## 2022-04-26 ENCOUNTER — HOSPITAL ENCOUNTER (OUTPATIENT)
Dept: CT IMAGING | Age: 62
Discharge: HOME | End: 2022-04-28
Payer: COMMERCIAL

## 2022-04-26 ENCOUNTER — HOSPITAL ENCOUNTER (OUTPATIENT)
Dept: WOMENS IMAGING | Age: 62
Discharge: HOME | End: 2022-04-28
Payer: COMMERCIAL

## 2022-04-26 DIAGNOSIS — Z87.891 PERSONAL HISTORY OF TOBACCO USE: ICD-10-CM

## 2022-04-26 DIAGNOSIS — Z12.31 ENCOUNTER FOR SCREENING MAMMOGRAM FOR BREAST CANCER: ICD-10-CM

## 2022-04-26 PROCEDURE — 77063 BREAST TOMOSYNTHESIS BI: CPT

## 2022-04-26 PROCEDURE — 71271 CT THORAX LUNG CANCER SCR C-: CPT

## 2022-05-16 ENCOUNTER — OFFICE VISIT (OUTPATIENT)
Dept: VASCULAR SURGERY | Age: 62
End: 2022-05-16
Payer: COMMERCIAL

## 2022-05-16 VITALS
WEIGHT: 174 LBS | HEART RATE: 103 BPM | DIASTOLIC BLOOD PRESSURE: 82 MMHG | OXYGEN SATURATION: 92 % | RESPIRATION RATE: 18 BRPM | SYSTOLIC BLOOD PRESSURE: 158 MMHG | HEIGHT: 66 IN | BODY MASS INDEX: 27.97 KG/M2 | TEMPERATURE: 97.3 F

## 2022-05-16 DIAGNOSIS — I70.213 ATHEROSCLEROSIS OF NATIVE ARTERY OF BOTH LOWER EXTREMITIES WITH INTERMITTENT CLAUDICATION (HCC): Primary | ICD-10-CM

## 2022-05-16 PROCEDURE — 99213 OFFICE O/P EST LOW 20 MIN: CPT | Performed by: SURGERY

## 2022-05-16 ASSESSMENT — ENCOUNTER SYMPTOMS
ABDOMINAL DISTENTION: 0
TROUBLE SWALLOWING: 0
COUGH: 0
VOICE CHANGE: 0
VOMITING: 0
CHEST TIGHTNESS: 0
COLOR CHANGE: 0
SHORTNESS OF BREATH: 0
ABDOMINAL PAIN: 0
EYE PAIN: 0

## 2022-05-16 NOTE — PROGRESS NOTES
[address] AND VASCULAR  [address] [facility] [address]  Dept: [phone]     Patient: Samaria Dinero  : 1960  MRN: [id number]  DOS: 2022            HPI:  Samaria Dinero is a 59 y.o. female who returns to the office regarding her aortic endograft placed 8 years ago in  by Dr. Lilliana Shukla. This endograft had the unfortunate outcome of having the left limb occluded requiring a femorofemoral bypass. I do not know when the femorofemoral bypass was made. The bypass has been off and she is rendered with claudication in both lower extremities at the level of the calves. She has not had a femorofemoral bypass graft duplex. She had a CTA of the abdomen and pelvis revealing the occluded left limb with a patent right limb of the aortobiiliac endograft and also a processes going on in the right common femoral artery consistent with dissection versus atherosclerotic disease causing significant stenosis. The femorofemoral bypass graft actually comes off of the proximal SFA at the proximal anastomosis. It is the common femoral that is problematic which affects the inflow not only to the right but the left leg as well. I worry that if the common femoral causes thrombotic complications that the external iliac and entirety of the aortobiiliac endograft would occlude as well as the femorofemoral bypass. This would render her lower extremities likely with rest pain and a limb salvage situation. Review of Systems   Constitutional: Negative for activity change, fever and unexpected weight change. HENT: Negative for trouble swallowing and voice change. Eyes: Negative for pain and visual disturbance. Respiratory: Negative for cough, chest tightness and shortness of breath. Cardiovascular: Negative for chest pain and palpitations. Gastrointestinal: Negative for abdominal distention, abdominal pain and vomiting.    Endocrine: Negative for cold intolerance and heat intolerance. Genitourinary: Negative for dysuria, flank pain and hematuria. Musculoskeletal: Negative for joint swelling and neck pain. Skin: Negative for color change and rash. Allergic/Immunologic: Negative for immunocompromised state. Neurological: Negative for syncope, speech difficulty, weakness, numbness and headaches. Hematological: Negative for adenopathy. Psychiatric/Behavioral: Negative for behavioral problems and suicidal ideas. Vitals:    05/16/22 0917 05/16/22 0918   BP: (!) 165/88 (!) 158/82   Site: Left Upper Arm Right Upper Arm   Position: Sitting Sitting   Cuff Size: Large Adult Large Adult   Pulse: 103    Resp: 18    Temp: 97.3 °F (36.3 °C)    TempSrc: Temporal    SpO2: 92%    Weight: 174 lb (78.9 kg)    Height: 5' 6\" (1.676 m)           Physical Exam  Constitutional:       General: She is not in acute distress. HENT:      Mouth/Throat:      Mouth: Mucous membranes are moist.      Pharynx: Oropharynx is clear. Eyes:      General: No scleral icterus. Extraocular Movements: Extraocular movements intact. Conjunctiva/sclera: Conjunctivae normal.   Neck:      Thyroid: No thyroid mass or thyromegaly. Cardiovascular:      Rate and Rhythm: Normal rate and regular rhythm. Heart sounds: No murmur heard. Comments: She has very weak femoral pulses bilaterally. The right groin has an easily audible bruit. There is a very faint thrill at that location as well. She has no popliteal dorsalis pedis or posterior tibial pulses. She has no ulceration or gangrene. She does have dependent rubor bilaterally. Pulmonary:      Effort: No respiratory distress. Breath sounds: No rales. Abdominal:      General: There is no distension. Palpations: There is no mass. Tenderness: There is no abdominal tenderness. There is no guarding. Musculoskeletal:      Cervical back: No rigidity or tenderness.    Lymphadenopathy: Cervical: No cervical adenopathy. Skin:     Coloration: Skin is not jaundiced. Findings: No rash. Neurological:      General: No focal deficit present. Mental Status: She is alert and oriented to person, place, and time. Cranial Nerves: No cranial nerve deficit. Psychiatric:         Mood and Affect: Mood normal.         Assessment:  1. Atherosclerosis of native artery of both lower extremities with intermittent claudication ([address]          Plan: At this point I am very concerned that if the common femoral on the right which is the donor side for the femorofemoral bypass graft goes down, she will be left with a significant problem which will be a limb salvage issue. She understands and we will have her back after a femorofemoral bypass graft duplex principally to look at the right groin and to see the velocities in that groin. 18 months ago or just over 18 months ago there was an elevated velocity in the common femoral artery on the right. The velocities were over 400 cm/s on that side. If they have increased and or are the same then I would probably go ahead and repair that groin so that we would preserve the entire system. Her ALEXANDRA at the same time was quite poor on the right at 0.2-0.5 and on the left 0.5. We will see her in 2 weeks with the above-mentioned studies.     Electronically signed by:  Chriss Libman, MD

## 2022-06-06 ENCOUNTER — HOSPITAL ENCOUNTER (OUTPATIENT)
Dept: VASCULAR LAB | Age: 62
Discharge: HOME | End: 2022-06-06
Payer: COMMERCIAL

## 2022-06-06 DIAGNOSIS — I70.213 ATHEROSCLEROSIS OF NATIVE ARTERY OF BOTH LOWER EXTREMITIES WITH INTERMITTENT CLAUDICATION (HCC): ICD-10-CM

## 2022-06-06 PROCEDURE — 93925 LOWER EXTREMITY STUDY: CPT

## 2022-06-06 PROCEDURE — 93923 UPR/LXTR ART STDY 3+ LVLS: CPT

## 2022-06-13 ENCOUNTER — OFFICE VISIT (OUTPATIENT)
Dept: VASCULAR SURGERY | Age: 62
End: 2022-06-13
Payer: COMMERCIAL

## 2022-06-13 VITALS
OXYGEN SATURATION: 98 % | RESPIRATION RATE: 18 BRPM | SYSTOLIC BLOOD PRESSURE: 138 MMHG | BODY MASS INDEX: 27.64 KG/M2 | TEMPERATURE: 98.1 F | DIASTOLIC BLOOD PRESSURE: 82 MMHG | HEART RATE: 96 BPM | WEIGHT: 172 LBS | HEIGHT: 66 IN

## 2022-06-13 DIAGNOSIS — Z01.818 PRE-OP TESTING: ICD-10-CM

## 2022-06-13 DIAGNOSIS — F17.210 SMOKING GREATER THAN 40 PACK YEARS: ICD-10-CM

## 2022-06-13 DIAGNOSIS — I70.213 ATHEROSCLER OF NATIVE ARTERY OF BOTH LEGS WITH INTERMIT CLAUDICATION (HCC): Primary | ICD-10-CM

## 2022-06-13 PROCEDURE — 99214 OFFICE O/P EST MOD 30 MIN: CPT | Performed by: SURGERY

## 2022-06-13 ASSESSMENT — ENCOUNTER SYMPTOMS
COLOR CHANGE: 0
VOMITING: 0
EYE PAIN: 0
SHORTNESS OF BREATH: 0
TROUBLE SWALLOWING: 0
VOICE CHANGE: 0
CHEST TIGHTNESS: 0
ABDOMINAL DISTENTION: 0
COUGH: 0
ABDOMINAL PAIN: 0

## 2022-06-13 NOTE — H&P (VIEW-ONLY)
[address] AND VASCULAR  [facility]  Dept: [phone]     Patient: Ofelia Hogan  : 1960  MRN: [id number]  DOS: 2022    Referring provider:  No ref. provider found         HPI:  Ofelia Hogan is a 57 y.o. female who returns to the office for bilateral lower extremity short distance claudication. Recall that she had an aortoiliac bypass with an endograft for aneurysmal disease. The left limb of that graft went down in  requiring a femorofemoral bypass graft. The femorofemoral bypass graft has worked to my knowledge well but recently she is developing some narrowing in the right groin which is the donor artery. She has an extremely elevated velocity on that side measuring approximately 5 to 600 cm/s just proximal to the takeoff of the femorofemoral bypass graft. The anastomosis is not narrowed. It also appears to be dissected. The left side is widely patent. The iliac limb of the aortobiiliac endograft appears to be widely patent. She does have a palpable pulse in the right common femoral artery above the level of the femorofemoral bypass graft. The patient has never had ulceration. She denies rest pain. She continues to smoke cigarettes despite warnings to the contrary. She used to smoke about 2 packs/day and now she is smoking approximately three quarters of a pack. Her  has just quit smoking 3 weeks ago. She had an ALEXANDRA measuring pressures corresponding to 0.6 and 0.7 bilaterally. She is diabetic. She does have high blood pressure and high cholesterol.   Past Medical History:   Diagnosis Date    AA [facility]aortic aneurysm) ([facility])     MILD SUPRA-RENAL    Anxiety     Blood clot in vein     Bronchitis     Cervical cancer (HCC)     Cervix no chemo or radiation    CKD (chronic kidney disease) stage 3, GFR 30-59 ml/min ([facility])     Depression     Hx of blood clots     Hyperlipidemia  Active Member of Clubs or Organizations: Not on file    Attends Club or Organization Meetings: Not on file    Marital Status: Not on file   Intimate Partner Violence:     Fear of Current or Ex-Partner: Not on file    Emotionally Abused: Not on file    Physically Abused: Not on file    Sexually Abused: Not on file   Housing Stability:     Unable to Pay for Housing in the Last Year: Not on file    Number of [place] in the Last Year: Not on file    Unstable Housing in the Last Year: Not on file      Past Surgical History:   Procedure Laterality Date    ANGIOPLASTY      RT. FEMORAL ARTERY/BILATERAL ILIAC ARTERY    CATARACT REMOVAL  09/26/2019    CATARACT REMOVAL WITH IMPLANT  09/12/2019    CHOLECYSTECTOMY      EMBOLECTOMY  Oct 16 2013     BILATERAL ILIAC    HYSTERECTOMY (CERVIX STATUS UNKNOWN)  1995    TOTAL/CERVICAL CANCER    KNEE ARTHROSCOPY      OVARY REMOVAL      TOE AMPUTATION Left 12/21/15    5TH PARTIAL-baby toe    TUBAL LIGATION      VASCULAR SURGERY      fem-fem      Review of Systems   Constitutional: Negative for activity change, fever and unexpected weight change. HENT: Negative for trouble swallowing and voice change. Eyes: Negative for pain and visual disturbance. Respiratory: Negative for cough, chest tightness and shortness of breath. Cardiovascular: Negative for chest pain and palpitations. Gastrointestinal: Negative for abdominal distention, abdominal pain and vomiting. Endocrine: Negative for cold intolerance and heat intolerance. Genitourinary: Negative for dysuria, flank pain and hematuria. Musculoskeletal: Negative for joint swelling and neck pain. Skin: Negative for color change and rash. Allergic/Immunologic: Negative for immunocompromised state. Neurological: Negative for syncope, speech difficulty, weakness, numbness and headaches. Hematological: Negative for adenopathy.    Psychiatric/Behavioral: Negative for behavioral problems and suicidal ideas. Vitals:    06/13/22 0928 06/13/22 0929   BP: (!) 141/85 138/82   Site: Left Upper Arm Right Upper Arm   Position: Sitting Sitting   Cuff Size: Large Adult Large Adult   Pulse: 96    Resp: 18    Temp: 98.1 °F (36.7 °C)    TempSrc: Temporal    SpO2: 98%    Weight: 172 lb (78 kg)    Height: 5' 6\" (1.676 m)           Physical Exam  Constitutional:       General: She is not in acute distress. HENT:      Mouth/Throat:      Mouth: Mucous membranes are moist.      Pharynx: Oropharynx is clear. Eyes:      General: No scleral icterus. Extraocular Movements: Extraocular movements intact. Conjunctiva/sclera: Conjunctivae normal.   Neck:      Thyroid: No thyroid mass or thyromegaly. Cardiovascular:      Rate and Rhythm: Normal rate and regular rhythm. Heart sounds: No murmur heard. Comments: The patient has a weakly palpable femoral pulse on the right. There is no femoral pulse on the left. There is no pulse distal to the bypass graft on the right. Her feet are warm and adequately perfused without ulceration or gangrene. She has minimal dependent rubor. She has no hair growth on the toes. She has no popliteal dorsalis pedis or posterior tibial pulses on either side. Her abdomen is soft nontender nondistended. She has no carotid bruits. She has a normal rate and rhythm. There are no precordial murmurs. Pulmonary:      Effort: No respiratory distress. Breath sounds: No rales. Abdominal:      General: There is no distension. Palpations: There is no mass. Tenderness: There is no abdominal tenderness. There is no guarding. Musculoskeletal:      Cervical back: No rigidity or tenderness. Lymphadenopathy:      Cervical: No cervical adenopathy. Skin:     Coloration: Skin is not jaundiced. Findings: No rash. Neurological:      General: No focal deficit present. Mental Status: She is alert and oriented to person, place, and time.       Cranial Nerves: No cranial nerve deficit. Psychiatric:         Mood and Affect: Mood normal.         Assessment:  1. Atheroscler of native artery of both legs with intermit claudication ([address]    2. Smoking greater than 40 pack years    3. Chest pain due to CAD ([address]    4. Pre-op testing          Plan: At this point I think the best option is to go ahead with right common femoral reconstruction with removal of the london of the graft and replacement of the london of the femorofemoral bypass graft after endarterectomy. I think that there is a dissected piece of plaque in that location causing significant flow disturbances and significant velocity elevations. She has no femoral pulse on the left side despite widely patent anastomoses bilaterally. The worst scenario would be for this graft to go down due to a common femoral stenosis at which point I think that the aortobiiliac bypass graft would also thrombose given its very poor outflow. At that point she would have significant lower extremity problems probably including rest pain and this would be a limb salvage issue. She understands and agrees and also understands that there are risks to these procedures including but not limited to bleeding, infection, hematoma, nerve damage, permanent neurologic disability, limb loss, renal failure or insufficiency, stroke, heart attack and death. She agrees to proceed. I think that she should have an EKG as well as a comprehensive metabolic panel, CBC with differential, PT/INR and type and screen. In addition I think she should have an echocardiogram to see what her ejection fraction is and cardiac function. The diagnosis above of chest pain due to coronary artery disease is not correct. She does not have chest pain with exertion however she cannot walk very far. Her walking distance is approximately 50 feet.   We will get the studies and see her in the operating room for right groin reconstruction in the near future.     Electronically signed by:  Alisia Roberto MD 111.9

## 2022-06-13 NOTE — PROGRESS NOTES
[address] [facility] VASCULAR  [facility]  Dept: [phone]     Patient: Dewayne Sanchez  : 1960  MRN: [id number]  DOS: 2022    Referring provider:  No ref. provider found         HPI:  Dewayne Sanchez is a 57 y.o. female who returns to the office for bilateral lower extremity short distance claudication. Recall that she had an aortoiliac bypass with an endograft for aneurysmal disease. The left limb of that graft went down in  requiring a femorofemoral bypass graft. The femorofemoral bypass graft has worked to my knowledge well but recently she is developing some narrowing in the right groin which is the donor artery. She has an extremely elevated velocity on that side measuring approximately 5 to 600 cm/s just proximal to the takeoff of the femorofemoral bypass graft. The anastomosis is not narrowed. It also appears to be dissected. The left side is widely patent. The iliac limb of the aortobiiliac endograft appears to be widely patent. She does have a palpable pulse in the right common femoral artery above the level of the femorofemoral bypass graft. The patient has never had ulceration. She denies rest pain. She continues to smoke cigarettes despite warnings to the contrary. She used to smoke about 2 packs/day and now she is smoking approximately three quarters of a pack. Her  has just quit smoking 3 weeks ago. She had an ALEXANDRA measuring pressures corresponding to 0.6 and 0.7 bilaterally. She is diabetic. She does have high blood pressure and high cholesterol.   Past Medical History:   Diagnosis Date    AA (aortic aneurysm) (HCC)     MILD SUPRA-RENAL    Anxiety     Blood clot in vein     Bronchitis     Cervical cancer (HCC)     Cervix no chemo or radiation    CKD (chronic kidney disease) stage 3, GFR 30-59 ml/min ([facility])     Depression     Hx of blood clots     Hyperlipidemia  Hyperlipidemia     Hypertension     Nephrolith     Peripheral vascular disease ([facility]     Prediabetes     Suicide attempt ([facility]     2012 x2 with pills     Family History   Problem Relation Age of Onset    Diabetes Brother     High Blood Pressure Brother     Coronary Art Dis Brother     Seizures Brother     Other Mother         renal failure,thyroidectomy    Diabetes Mother     Heart Disease Father     Hearing Loss Brother       Social History     Socioeconomic History    Marital status:      Spouse name: Not on file    Number of children: Not on file    Years of education: Not on file    Highest education level: Not on file   Occupational History    Occupation: disability   Tobacco Use    Smoking status: Current Every Day Smoker     Packs/day: 1.50     Years: 20.00     Pack years: 30.00     Types: Cigarettes    Smokeless tobacco: Never Used   Vaping Use    Vaping Use: Some days    Substances: Never   Substance and Sexual Activity    Alcohol use: No     Alcohol/week: 0.0 standard drinks     Comment:   quit 8-30-13    Drug use: No    Sexual activity: Not Currently   Other Topics Concern    Not on file   Social History Narrative    Not on file     Social Determinants of Health     Financial Resource Strain:     Difficulty of Paying Living Expenses: Not on file   Food Insecurity:     Worried About Running Out of Food in the Last Year: Not on file    Radha of Food in the Last Year: Not on file   Transportation Needs:     Lack of Transportation (Medical): Not on file    Lack of Transportation (Non-Medical):  Not on file   Physical Activity: Inactive    Days of Exercise per Week: 0 days    Minutes of Exercise per Session: 0 min   Stress:     Feeling of Stress : Not on file   Social Connections:     Frequency of Communication with Friends and Family: Not on file    Frequency of Social Gatherings with Friends and Family: Not on file    Attends [employer] Services: Not on file  Active Member of Clubs or Organizations: Not on file    Attends Club or Organization Meetings: Not on file    Marital Status: Not on file   Intimate Partner Violence:     Fear of Current or Ex-Partner: Not on file    Emotionally Abused: Not on file    Physically Abused: Not on file    Sexually Abused: Not on file   Housing Stability:     Unable to Pay for Housing in the Last Year: Not on file    Number of [place] in the Last Year: Not on file    Unstable Housing in the Last Year: Not on file      Past Surgical History:   Procedure Laterality Date    ANGIOPLASTY      RT. FEMORAL ARTERY/BILATERAL ILIAC ARTERY    CATARACT REMOVAL  09/26/2019    CATARACT REMOVAL WITH IMPLANT  09/12/2019    CHOLECYSTECTOMY      EMBOLECTOMY  Oct 16 2013     BILATERAL ILIAC    HYSTERECTOMY (CERVIX STATUS UNKNOWN)  1995    TOTAL/CERVICAL CANCER    KNEE ARTHROSCOPY      OVARY REMOVAL      TOE AMPUTATION Left 12/21/15    5TH PARTIAL-baby toe    TUBAL LIGATION      VASCULAR SURGERY      [id number]      Review of Systems   Constitutional: Negative for activity change, fever and unexpected weight change. HENT: Negative for trouble swallowing and voice change. Eyes: Negative for pain and visual disturbance. Respiratory: Negative for cough, chest tightness and shortness of breath. Cardiovascular: Negative for chest pain and palpitations. Gastrointestinal: Negative for abdominal distention, abdominal pain and vomiting. Endocrine: Negative for cold intolerance and heat intolerance. Genitourinary: Negative for dysuria, flank pain and hematuria. Musculoskeletal: Negative for joint swelling and neck pain. Skin: Negative for color change and rash. Allergic/Immunologic: Negative for immunocompromised state. Neurological: Negative for syncope, speech difficulty, weakness, numbness and headaches. Hematological: Negative for adenopathy.    Psychiatric/Behavioral: Negative for behavioral problems and suicidal ideas. Vitals:    06/13/22 0928 06/13/22 0929   BP: (!) 141/85 138/82   Site: Left Upper Arm Right Upper Arm   Position: Sitting Sitting   Cuff Size: Large Adult Large Adult   Pulse: 96    Resp: 18    Temp: 98.1 °F (36.7 °C)    TempSrc: Temporal    SpO2: 98%    Weight: 172 lb (78 kg)    Height: 5' 6\" (1.676 m)           Physical Exam  Constitutional:       General: She is not in acute distress. HENT:      Mouth/Throat:      Mouth: Mucous membranes are moist.      Pharynx: Oropharynx is clear. Eyes:      General: No scleral icterus. Extraocular Movements: Extraocular movements intact. Conjunctiva/sclera: Conjunctivae normal.   Neck:      Thyroid: No thyroid mass or thyromegaly. Cardiovascular:      Rate and Rhythm: Normal rate and regular rhythm. Heart sounds: No murmur heard. Comments: The patient has a weakly palpable femoral pulse on the right. There is no femoral pulse on the left. There is no pulse distal to the bypass graft on the right. Her feet are warm and adequately perfused without ulceration or gangrene. She has minimal dependent rubor. She has no hair growth on the toes. She has no popliteal dorsalis pedis or posterior tibial pulses on either side. Her abdomen is soft nontender nondistended. She has no carotid bruits. She has a normal rate and rhythm. There are no precordial murmurs. Pulmonary:      Effort: No respiratory distress. Breath sounds: No rales. Abdominal:      General: There is no distension. Palpations: There is no mass. Tenderness: There is no abdominal tenderness. There is no guarding. Musculoskeletal:      Cervical back: No rigidity or tenderness. Lymphadenopathy:      Cervical: No cervical adenopathy. Skin:     Coloration: Skin is not jaundiced. Findings: No rash. Neurological:      General: No focal deficit present. Mental Status: She is alert and oriented to person, place, and time.       Cranial Nerves: No cranial nerve deficit. Psychiatric:         Mood and Affect: Mood normal.         Assessment:  1. Atheroscler of native artery of both legs with intermit claudication ([address]    2. Smoking greater than 40 pack years    3. Chest pain due to CAD ([address]    4. Pre-op testing          Plan: At this point I think the best option is to go ahead with right common femoral reconstruction with removal of the london of the graft and replacement of the londno of the femorofemoral bypass graft after endarterectomy. I think that there is a dissected piece of plaque in that location causing significant flow disturbances and significant velocity elevations. She has no femoral pulse on the left side despite widely patent anastomoses bilaterally. The worst scenario would be for this graft to go down due to a common femoral stenosis at which point I think that the aortobiiliac bypass graft would also thrombose given its very poor outflow. At that point she would have significant lower extremity problems probably including rest pain and this would be a limb salvage issue. She understands and agrees and also understands that there are risks to these procedures including but not limited to bleeding, infection, hematoma, nerve damage, permanent neurologic disability, limb loss, renal failure or insufficiency, stroke, heart attack and death. She agrees to proceed. I think that she should have an EKG as well as a comprehensive metabolic panel, CBC with differential, PT/INR and type and screen. In addition I think she should have an echocardiogram to see what her ejection fraction is and cardiac function. The diagnosis above of chest pain due to coronary artery disease is not correct. She does not have chest pain with exertion however she cannot walk very far. Her walking distance is approximately 50 feet.   We will get the studies and see her in the operating room for right groin reconstruction in the near future.     Electronically signed by:  Jessica Mcdowell MD

## 2022-06-16 ENCOUNTER — HOSPITAL ENCOUNTER (OUTPATIENT)
Dept: PREADMISSION TESTING | Age: 62
Discharge: HOME | End: 2022-06-20
Payer: COMMERCIAL

## 2022-06-16 VITALS
TEMPERATURE: 97.9 F | BODY MASS INDEX: 27.64 KG/M2 | DIASTOLIC BLOOD PRESSURE: 70 MMHG | HEART RATE: 100 BPM | OXYGEN SATURATION: 95 % | RESPIRATION RATE: 20 BRPM | WEIGHT: 172 LBS | HEIGHT: 66 IN | SYSTOLIC BLOOD PRESSURE: 138 MMHG

## 2022-06-16 DIAGNOSIS — Z01.818 PREOP EXAMINATION: ICD-10-CM

## 2022-06-16 PROBLEM — R27.0 ATAXIA: Status: ACTIVE | Noted: 2022-06-16

## 2022-06-16 PROBLEM — F17.200 NICOTINE DEPENDENCE: Status: ACTIVE | Noted: 2022-06-16

## 2022-06-16 PROBLEM — I63.9 CEREBRAL INFARCTION (HCC): Status: ACTIVE | Noted: 2022-06-16

## 2022-06-16 LAB
ABO/RH: NORMAL
ABSOLUTE EOS #: 0.2 K/UL (ref 0–0.4)
ABSOLUTE LYMPH #: 2.4 K/UL (ref 1–4.8)
ABSOLUTE MONO #: 0.5 K/UL (ref 0.1–1.3)
ALBUMIN SERPL-MCNC: 4.2 G/DL (ref 3.5–5.2)
ALP BLD-CCNC: 95 U/L (ref 35–104)
ALT SERPL-CCNC: 15 U/L (ref 5–33)
ANION GAP SERPL CALCULATED.3IONS-SCNC: 11 MMOL/L (ref 9–17)
ANTIBODY SCREEN: NEGATIVE
ARM BAND NUMBER: NORMAL
AST SERPL-CCNC: 18 U/L
BASOPHILS # BLD: 1 % (ref 0–2)
BASOPHILS ABSOLUTE: 0.1 K/UL (ref 0–0.2)
BILIRUB SERPL-MCNC: 0.16 MG/DL (ref 0.3–1.2)
BUN BLDV-MCNC: 19 MG/DL (ref 8–23)
CALCIUM SERPL-MCNC: 9.7 MG/DL (ref 8.6–10.4)
CHLORIDE BLD-SCNC: 101 MMOL/L (ref 98–107)
CO2: 25 MMOL/L (ref 20–31)
CREAT SERPL-MCNC: 1.04 MG/DL (ref 0.5–0.9)
EOSINOPHILS RELATIVE PERCENT: 3 % (ref 0–4)
EXPIRATION DATE: NORMAL
GFR AFRICAN AMERICAN: >60 ML/MIN
GFR NON-AFRICAN AMERICAN: 53 ML/MIN
GFR SERPL CREATININE-BSD FRML MDRD: ABNORMAL ML/MIN/{1.73_M2}
GLUCOSE BLD-MCNC: 207 MG/DL (ref 70–99)
HCT VFR BLD CALC: 42.3 % (ref 36–46)
HEMOGLOBIN: 14.2 G/DL (ref 12–16)
INR BLD: 0.9
LYMPHOCYTES # BLD: 25 % (ref 24–44)
MCH RBC QN AUTO: 30.7 PG (ref 26–34)
MCHC RBC AUTO-ENTMCNC: 33.6 G/DL (ref 31–37)
MCV RBC AUTO: 91.4 FL (ref 80–100)
MONOCYTES # BLD: 6 % (ref 1–7)
PARTIAL THROMBOPLASTIN TIME: 25.7 SEC (ref 24–36)
PDW BLD-RTO: 14.7 % (ref 11.5–14.9)
PLATELET # BLD: 188 K/UL (ref 150–450)
PMV BLD AUTO: 8.6 FL (ref 6–12)
POTASSIUM SERPL-SCNC: 4.4 MMOL/L (ref 3.7–5.3)
PROTHROMBIN TIME: 11.8 SEC (ref 11.8–14.6)
RBC # BLD: 4.63 M/UL (ref 4–5.2)
SEG NEUTROPHILS: 65 % (ref 36–66)
SEGMENTED NEUTROPHILS ABSOLUTE COUNT: 6.3 K/UL (ref 1.3–9.1)
SODIUM BLD-SCNC: 137 MMOL/L (ref 135–144)
TOTAL PROTEIN: 7.4 G/DL (ref 6.4–8.3)
WBC # BLD: 9.7 K/UL (ref 3.5–11)

## 2022-06-16 PROCEDURE — 85610 PROTHROMBIN TIME: CPT

## 2022-06-16 PROCEDURE — 36415 COLL VENOUS BLD VENIPUNCTURE: CPT

## 2022-06-16 PROCEDURE — 85730 THROMBOPLASTIN TIME PARTIAL: CPT

## 2022-06-16 PROCEDURE — 80053 COMPREHEN METABOLIC PANEL: CPT

## 2022-06-16 PROCEDURE — APPSS45 APP SPLIT SHARED TIME 31-45 MINUTES: Performed by: NURSE PRACTITIONER

## 2022-06-16 PROCEDURE — 86850 RBC ANTIBODY SCREEN: CPT

## 2022-06-16 PROCEDURE — 86901 BLOOD TYPING SEROLOGIC RH(D): CPT

## 2022-06-16 PROCEDURE — 93005 ELECTROCARDIOGRAM TRACING: CPT | Performed by: SURGERY

## 2022-06-16 PROCEDURE — 86900 BLOOD TYPING SEROLOGIC ABO: CPT

## 2022-06-16 PROCEDURE — 85025 COMPLETE CBC W/AUTO DIFF WBC: CPT

## 2022-06-16 RX ORDER — AMOXICILLIN 500 MG
CAPSULE ORAL DAILY
COMMUNITY

## 2022-06-16 ASSESSMENT — ENCOUNTER SYMPTOMS
RHINORRHEA: 0
COUGH: 1
WHEEZING: 0
ANAL BLEEDING: 0
NAUSEA: 0
COLOR CHANGE: 0
SHORTNESS OF BREATH: 1
VOMITING: 0
BLOOD IN STOOL: 0
CONSTIPATION: 0
APNEA: 1
DIARRHEA: 0
ABDOMINAL PAIN: 0
TROUBLE SWALLOWING: 0
SORE THROAT: 0

## 2022-06-16 NOTE — H&P (VIEW-ONLY)
HISTORY and Treinta HANANE [address]       NAME:  Krystle East  MRN: [id number]   YOB: 1960   Date: 6/17/2022   Age: 57 y.o. Gender: female     COMPLAINT AND PRESENT HISTORY:   Krystle East is 57 y.o.,  female, presents for pre-anesthesia/admission testing for RIGHT GROIN RECONSTRUCTION OF FEMORAL TO FEMORAL BYPASS W/ ENDARTECTOMY per Dr. Dallas Gates. Primary dx: BILATERAL LOWER EXTREMITY CLAUDICATION. HPI:  SEE PORTION OF NOTE BELOW PER DR. Aguilar, 6-13-22 (REVIEWED):  HPI:  Krystle East is a 57 y.o. female who returns to the office for bilateral lower extremity short distance claudication. Recall that she had an aortoiliac bypass with an endograft for aneurysmal disease. The left limb of that graft went down in 2014 requiring a femorofemoral bypass graft. The femorofemoral bypass graft has worked to my knowledge well but recently she is developing some narrowing in the right groin which is the donor artery. She has an extremely elevated velocity on that side measuring approximately 5 to 600 cm/s just proximal to the takeoff of the femorofemoral bypass graft. The anastomosis is not narrowed. It also appears to be dissected. The left side is widely patent. The iliac limb of the aortobiiliac endograft appears to be widely patent. She does have a palpable pulse in the right common femoral artery above the level of the femorofemoral bypass graft. The patient has never had ulceration. She denies rest pain. She continues to smoke cigarettes despite warnings to the contrary. She used to smoke about 2 packs/day and now she is smoking approximately three quarters of a pack. Her  has just quit smoking 3 weeks ago. She had an ALEXANDRA measuring pressures corresponding to 0.6 and 0.7 bilaterally. She is diabetic. She does have high blood pressure and high cholesterol. Plan:   At this point I think the best option is to go ahead with right common femoral reconstruction with removal of the london of the graft and replacement of the london of the femorofemoral bypass graft after endarterectomy. I think that there is a dissected piece of plaque in that location causing significant flow disturbances and significant velocity elevations. She has no femoral pulse on the left side despite widely patent anastomoses bilaterally. The worst scenario would be for this graft to go down due to a common femoral stenosis at which point I think that the aortobiiliac bypass graft would also thrombose given its very poor outflow. At that point she would have significant lower extremity problems probably including rest pain and this would be a limb salvage issue. She understands and agrees and also understands that there are risks to these procedures including but not limited to bleeding, infection, hematoma, nerve damage, permanent neurologic disability, limb loss, renal failure or insufficiency, stroke, heart attack and death. She agrees to proceed. I think that she should have an EKG as well as a comprehensive metabolic panel, CBC with differential, PT/INR and type and screen. In addition I think she should have an echocardiogram to see what her ejection fraction is and cardiac function. The diagnosis above of chest pain due to coronary artery disease is not correct. She does not have chest pain with exertion however she cannot walk very far. Her walking distance is approximately 50 feet. We will get the studies and see her in the operating room for right groin reconstruction in the near future. Patient with significant vascular surgery, first dating back to 2010 which was done at [facility] per prior chart review.  Prior vascular records were reviewed from [facility], as well as [facility] (available in media)- prior vascular surgeries updated w/detail and exact dates as available (patient called post-PAT appointment on 6-16-22 to review updated vascular surgeries and confirm). Copied below (also noted in surgical hx):  VASCULAR SURGERY  01/10/2010 B/L ILIAC EMBOLECTOMY / BALLOON ANGIOPLASTY AND STENTING OF B/L COMMON ILIAC ARTERY / PATCH ANGIOPLASTY OF RIGHT COMMON FEMORAL ARTERY / COMPLETION ANGIOGRAM (DR. Yao [facility] 5106)    VASCULAR SURGERY  09/01/2013 BILATERAL ILIAC ARTERY THROMBECTOMY / AORTOILIAC ANGIOGRAM / ANGIOPLASTY AND STENTING OF ABD AORTA AND KISSING AND THE B/L COMMON ILIAC ARTERY STENTING / ANGIOPLASTY AND STENTING OF THE LEFT COMMON ILIAC ARTERY ([facility]- DR. JACKSON)    VASCULAR SURGERY  10/16/2013 [id number] ANGIOGRAM / DEPLOYMENT OF ENDURANT [id number] STENT GRAFT / EMBOLIZATION OF LEFT COMMON ILIAC ARTERY W/AMPLATZER PLUG / FEMORAL TO FEMORAL BYPASS GRAFT USING 8 MM RINGED GRAFT / SELECTIVE RIGHT LOWER EXTREMITY ANGIOGRAM / ACCESS OF THE LEFT BRACHIAL ARTERY / STENTING OF RIGHT EXTERNAL ILIAC ARTERY ([address] [facility]- DR. JACKSON)      Testing completed r/t condition: (See chart for additional testing)  Narrative       [address] [place] [address]    Vascular Lower Arterial Plethysmography Procedure        Patient Name Jovanny Pandey     Date of Study           06/06/2022                  SARAH WOOYD        Date of Birth 1960  Gender                  Female        Age           62 year(s)  Race                            Room Number        Corporate ID  [id number]    #        Patient Acct [de-identified]    #        MR #          [id number]      Sonographer             Yoahnnes Miller        Accession #   [id number]  Interpreting Physician  Hugo Aguilar        Referring                 Referring Physician     Elen Stallworth MD    Nurse    Practitioner       Procedure   Type of Study:        Extremities Arteries: Lower Arterial Plethysmography, PVR Lower.       Indications for Study:Claudication.       Patient Status:Out Patient.    Technical Quality:Adequate visualization.        Conclusions        Summary        Bilateral:    Abnormal ALEXANDRA consistent with mild to moderate arterial disease.    The PVR waveform is consistent with a patent femoropopliteal system    bilaterally. Suspect inflow disease.        Signature        ----------------------------------------------------------------    Electronically signed by Chetan Kern(Sonographer) on    06/06/2022 11:16 AM    ----------------------------------------------------------------        ----------------------------------------------------------------    Electronically signed by Katarzyna Vázquez physician)   Fernando Pedro 06/06/2022 12:46 PM    ----------------------------------------------------------------       Findings:        Right Impression:                    Left Impression:    Moderately abnormal PVR waveform     Moderately abnormal PVR waveform    contour at the level of the thigh,   contour at the level of the thigh,    calf, and ankle.                     calf, and ankle.        Calf waveform augmentation noted.    Calf waveform augmentation noted.       Velocities are measured in cm/s ; Diameters are measured in cm       Pressures   +--------------------------------------++--------+-----+----+--------+-----+   !                                      ! ! Right   !     !Left!        !     !   +--------------------------------------++--------+-----+----+--------+-----+   ! Location                              ! ! Pressure! Ratio!    !Pressure! Ratio! +--------------------------------------++--------+-----+----+--------+-----+   ! Calf                                  !!89      !0.66 !    !62      !0.69 !   +--------------------------------------++--------+-----+----+--------+-----+   ! Ankle PT                              !!94      !0.7  !   FiliLennox      !0.7  !   +--------------------------------------++--------+-----+----+--------+-----+   ! Ankle DP                              !!96      !0.71 !    !92      !0.68 !   +--------------------------------------++--------+-----+----+--------+-----+   ! Great Toe                             !!46      !0.39 !   Nita      !0.47 !   +--------------------------------------++--------+-----+----+--------+-----+         - Brachial Pressure:Right: 133. Left:135.         - ALEXANDRA:Right: 0.71. Left: 0.7.       Plethysmographic Digit Evaluation   +---------++--------+-----+---------------++--------+-----+----------------+   !         ! ! Right   !     ! Left           !!        !     !                !   +---------++--------+-----+---------------++--------+-----+----------------+   ! Location ! !Pressure! Ratio! PPG Wave Form  !!Pressure! Ratio! PPG Wave Form   !   +---------++--------+-----+---------------++--------+-----+----------------+   ! Great Toe!!52      !0.39 !               !!61      !0.47 !                !   +---------++--------+-----+---------------++--------+-----+----------------+         [place]       [address] [place] [address]    Vascular Lower Extremities Arterial Duplex Procedure        Patient Name Jovanny Pandey     Date of Study           06/06/2022                  SARAH WOODY        Date of Birth 1960  Gender                  Female        Age           62 year(s)  Race                            Room Number        Corporate ID  [id number]    #        Patient Acct [de-identified]    #        MR #          [id number]      Sonographer             Mo Monsivais        Accession #   [id number]  Interpreting Physician  Hugo Aguilar        Referring                 Referring Physician     Gauri Barber., MD    Nurse    Practitioner       Procedure   Type of Study:        Extremities Arteries: Grafts Survey, Arterial Bypass Graft Lwr Bilat,    Lower Extremities Arterial Duplex, Arterial Scan Lower Right.       Indications for Study:Follow-up of arterial bypass graft.       Patient Status:Out Patient.    Technical Quality:Adequate visualization.       Comments:Patent right to left fem - fem bypass graft        Conclusions        Summary        The bypass graft is seeming to originate from the Proximal SFA which is    diseased and has a very high velocity consistent with significant    stenosis. It is possible that it is originating from the common femoral    which is dissected causing this flow disturbance. Clinical correlation is    mandatory. The bypass is widely patent without significant stenosis.        Signature        ----------------------------------------------------------------    Electronically signed by Chetan Mcleod(Sonographer) on    06/06/2022 11:11 AM    ----------------------------------------------------------------        ----------------------------------------------------------------    Electronically signed by Angeline [place] )   Walter Hare 06/06/2022 12:52 PM    ----------------------------------------------------------------       Findings:        Right Impression:    Proximal SFA ( pre proximal anastamosis ) with plaque and significant    color flow turbulence.        P1 - 468/70 Inflow SFA    P2 - 150/19    A1 - 227/24 Prox anastomosis    A2 - 125/19 Right    G1 - 66/13    G2 - 61/18 Graft    G3 - 75/13    A3 - 79/15 Distal anastomosis    A4 - 164/30 Left    D1 - 71/0    D2 - 74/0 Outflow             Review of additional significant medical hx:  AA, HLD, HTN, PVD: She does not see a cardiologist. Denies current/recent chest pain, palpitations, leg swelling. + SOB depending on activity, intermittently. + dizzy spells- intermittent (denies currently, states she believes her PCP is aware). States right foot is swollen at times, otherwise denies leg swelling. States she is getting a h/a currently, states very mild- not worst h/a ever. Per prior record review, patient underwent a normal stress test at [facility] in 2013 (placed on surgery chart).  Patient is scheduled for her ECHO this Saturday at [facility].  Current medications r/t condition: Losartan, Toprol XL, simvastatin, ASA    HOLTER MONITOR, 11-8-16:  Test type:  24-hour Holter Monitor     Indication: PALPITATIONS     Interpretation:     The prevailing rhythm was sinus tachycardia with an average heart rate of  107bpm and a maximum heart rate of 149bpm.  There were intermittent  episodes of sinus tachycardia. There were no premature ventricular  contractions. There was one single premature atrial contraction. Sinus  rhythm and sinus tachycardia were present during various complaints of  \"dizzy, slight flutter in chest.\"  Sinus rhythm with a premature atrial  contraction was present when patient complained of \"heart flutter\".     Impression:        1. SINUS TACHYCARDIA. 2. SINUS RHYTHM. 3. PREMATURE ATRIAL CONTRACTION, ONLY ONE ISOLATED PREMATURE ATRIAL     CONTRACTION. TTE procedure:2D Echocardiogram, M-Mode, Doppler, Color Doppler. Procedure Date  Date: 09/24/2015 Start: 07:42 AM     Study Location: [facility]  Technical Quality: Poor visualization due to lung interference.     Indications:Dizziness.     History / Tech. Comments:  smoker     Patient Status: Outpatient     Height: 66 inches Weight: 174 pounds BSA: 1.89 m^2 BMI: 28.08 kg/m^2     Rhythm: Sinus tachycardia HR: 108 bpm     CONCLUSIONS     Summary  Technically difficult study  Left ventricle is normal in size and wall thickness. Global left ventricular systolic function is normal.  Estimated ejection fraction is 65 % . No significant wall motion abnormality seen. RIght side is mildly dilated. Mild tricuspid regurgitation. Estimated right ventricular systolic pressure is 36 mmHg. No significant pericardial effusion is seen.     Signature  ----------------------------------------------------------------------------   Electronically signed by Cassie Melchor(Sonographer) on 09/24/2015 08:32   AM    Hx of blood clots: Patient states she thinks she has a hx of blood clots to b/l LE's- unsure if DVT's or superficial she's not sure when the last one was- possibly r/t last vascular surgery.  Patient denies any known personal/family hx of clotting d/o. Current medications r/t condition: ASA    Prediabetes: States she only checks her blood sugar when she \"feels bad\", excessively shaking/sweating. Current medications r/t condition: Glimepiride  Lab Results   Component Value Date    LABA1C 6.6 01/18/2022     Lab Results   Component Value Date     06/15/2020     Stroke: Patient states she believes she had several mini strokes in the past (2017)- denies any known deficits. No longer following w/neurology (previously following w/ [facility]). COPD, MAMIE: She does not f/u with pulmonology. She is a long term smoker- decreased recently to 3 quarters of a pack. + SOB intermittently, + \"smoker's cough\"- productive/phlegm (not new), denies wheezing. States machine was never recommended for sleep apnea. Patient denies any hx of utilizing home O2. Current medications r/t condition: Incruse    CKD: Following w/nephrology- Dr. Sara Nixon.  Lab Results   Component Value Date    CREATININE 1.04 (H) 06/16/2022    BUN 19 06/16/2022     06/16/2022    K 4.4 06/16/2022     06/16/2022    CO2 25 06/16/2022     Activity level: Does not do any routine exercise, but she is active. Functional Capacity per patient:              1. Patient IS NOT able to walk 2 city blocks on level ground without SOB. 2. Patient IS NOT able to climb 2 flights of stairs without SOB. Denies hx of MRSA infection. Denies hx of any personal or family hx of complications w/anesthesia. PAST MEDICAL HISTORY     Past Medical History:   Diagnosis Date    AA (aortic aneurysm) ([address]     MILD SUPRA-RENAL    Anxiety     Atherosclerosis of native artery of both lower extremities with intermittent claudication ([address] 05/16/2022    Blood clot in vein     Bronchitis     Cerebral infarction ([address]     several \"mini strokes\" on Feb 14 (about 2017? )- previously saw Dr. Efrain Enriquez (neurology)    Cervical cancer [facility])     Cervix no chemo or radiation    CKD (chronic kidney disease) stage 3, GFR 30-59 ml/min ([facility])     Dr. April Story- nephrologist    COPD [facility] disease) [facility]     Depression     Diabetes mellitus ([facility]     Dizziness     Eczema     Hx of blood clots     Hyperlipidemia     Hyperlipidemia     Hypertension     Insomnia     Limb ischemia     Loose, teeth     Nephrolith     Patient denies    MAMIE (obstructive sleep apnea)     \"years ago had sleep study, never used machine\"    [facility]     Stenosis of carotid artery 07/03/2017    Suicide attempt ([facility]     2012 x2 with pills (per prior record review, was admitted to [facility] 9-1-13 w/suicide attempt)    Wears dentures     Bottom       SURGICAL HISTORY       Past Surgical History:   Procedure Laterality Date    APPENDECTOMY      CATARACT REMOVAL  09/26/2019    CATARACT REMOVAL WITH IMPLANT  09/12/2019    CHOLECYSTECTOMY      HYSTERECTOMY (CERVIX STATUS UNKNOWN)  1995    TOTAL/CERVICAL CANCER    KNEE ARTHROSCOPY Left     OVARY REMOVAL      SKIN BIOPSY      TOE AMPUTATION Left 12/21/2015    5TH PARTIAL-baby toe    VASCULAR SURGERY  09/01/2013    BILATERAL ILIAC ARTERY THROMBECTOMY / AORTOILIAC ANGIOGRAM / ANGIOPLASTY AND STENTING OF ABD AORTA AND KISSING AND THE B/L COMMON ILIAC ARTERY STENTING / ANGIOPLASTY AND STENTING OF THE LEFT COMMON ILIAC ARTERY ([facility]- DR. JACKSON)    VASCULAR SURGERY  10/16/2013    [id number] ANGIOGRAM / DEPLOYMENT OF ENDURANT [id number] STENT GRAFT / EMBOLIZATION OF LEFT COMMON ILIAC ARTERY W/AMPLATZER PLUG / FEMORAL TO FEMORAL BYPASS GRAFT USING 8 MM RINGED GRAFT / SELECTIVE RIGHT LOWER EXTREMITY ANGIOGRAM / ACCESS OF THE LEFT BRACHIAL ARTERY / STENTING OF RIGHT EXTERNAL ILIAC ARTERY ([facility]- DR. JACKSON)    VASCULAR SURGERY  01/10/2010    B/L ILIAC EMBOLECTOMY / BALLOON ANGIOPLASTY AND STENTING OF B/L COMMON ILIAC ARTERY / PATCH ANGIOPLASTY OF RIGHT COMMON FEMORAL ARTERY / COMPLETION ANGIOGRAM ( [address])       SOCIAL HISTORY       Social History     Socioeconomic History    Marital status:      Spouse name: None    Number of children: None    Years of education: None    Highest education level: None   Occupational History    Occupation: disability   Tobacco Use    Smoking status: Current Every Day Smoker     Packs/day: 1.50     Years: 20.00     Pack years: 30.00     Types: Cigarettes     Start date: 1977    Smokeless tobacco: Never Used    Tobacco comment: \"Trying to quit\"- down to less than 3 quarters of a pack as of 6-16-22   Vaping Use    Vaping Use: Some days    Substances: Never   Substance and Sexual Activity    Alcohol use: Not Currently     Comment: Quit 8-30-13    Drug use: No    Sexual activity: Not Currently   Other Topics Concern    None   Social History Narrative    None     Social Determinants of Health     Financial Resource Strain:     Difficulty of Paying Living Expenses: Not on file   Food Insecurity:     Worried About Running Out of Food in the Last Year: Not on file    Radha of Food in the Last Year: Not on file   Transportation Needs:     Lack of Transportation (Medical): Not on file    Lack of Transportation (Non-Medical):  Not on file   Physical Activity: Inactive    Days of Exercise per Week: 0 days    Minutes of Exercise per Session: 0 min   Stress:     Feeling of Stress : Not on file   Social Connections:     Frequency of Communication with Friends and Family: Not on file    Frequency of Social Gatherings with Friends and Family: Not on file    Attends Restorationism Services: Not on file    Active Member of Clubs or Organizations: Not on file    Attends Club or Organization Meetings: Not on file    Marital Status: Not on file   Intimate Partner Violence:     Fear of Current or Ex-Partner: Not on file    Emotionally Abused: Not on file    Physically Abused: Not on file    Sexually Abused: Not on file   Housing Stability:  Unable to Pay for Housing in the Last Year: Not on file    Number of Places Lived in the Last Year: Not on file    Unstable Housing in the Last Year: Not on file       REVIEW OF SYSTEMS    No Known Allergies    Current Outpatient Medications on File Prior to Encounter   Medication Sig Dispense Refill    Omega-3 Fatty Acids ([address]) 1200 MG CAPS Take by mouth daily      vitamin D (ERGOCALCIFEROL) 1.25 MG (64591 UT) CAPS capsule Take 1 capsule by mouth once a week 4 capsule 5    losartan (COZAAR) 50 MG tablet TAKE ONE TABLET BY MOUTH DAILY (Patient taking differently: at bedtime TAKE ONE TABLET BY MOUTH DAILY) 90 tablet 1    famotidine (PEPCID) 20 MG tablet TAKE ONE TABLET BY MOUTH ONCE NIGHTLY 90 tablet 1    metoprolol succinate (TOPROL XL) 25 MG extended release tablet TAKE ONE TABLET BY MOUTH DAILY (Patient taking differently: at bedtime TAKE ONE TABLET BY MOUTH DAILY) 90 tablet 2    glimepiride (AMARYL) 4 MG tablet TAKE ONE TABLET BY MOUTH EVERY MORNING AT BREAKFAST AND TAKE ONE TABLET BY MOUTH DAILY WITH DINNER 180 tablet 2    Umeclidinium Bromide (INCRUSE ELLIPTA) 62.5 MCG/INH AEPB INHALE ONE PUFF BY MOUTH DAILY 3 each 3    simvastatin (ZOCOR) 40 MG tablet TAKE ONE TABLET BY MOUTH ONCE NIGHTLY 90 tablet 2    gabapentin (NEURONTIN) 100 MG capsule Take 1 capsule by mouth 3 times daily for 180 days. 270 capsule 1    [DISCONTINUED] simvastatin (ZOCOR) 40 MG tablet TAKE ONE TABLET BY MOUTH ONCE NIGHTLY 90 tablet 0    [DISCONTINUED] glimepiride (AMARYL) 4 MG tablet TAKE ONE TABLET BY MOUTH EVERY MORNING AT BREAKFAST AND TAKE 1 TABLET BY MOUTH DAILY AT DINNER 180 tablet 0    [DISCONTINUED] losartan (COZAAR) 50 MG tablet TAKE ONE TABLET BY MOUTH DAILY 90 tablet 0    fluocinonide (LIDEX) 0.05 % ointment Apply topically 2 times daily Apply topically 2 times daily.       acetaminophen (TYLENOL) 500 MG tablet Take 500 mg by mouth as needed for Pain      ARIPiprazole (ABILIFY) 5 MG tablet 5 mg daily  ONETOUCH DELICA LANCETS 05E MISC Check BS bid and prn 100 each 5    ONE TOUCH ULTRA TEST strip USE ONE STRIP TO TEST TWICE A  strip 1    aspirin EC 81 MG EC tablet Take 1 tablet by mouth daily 128 tablet 0    FETZIMA 120 MG CP24 ER capsule Take 120 mg by mouth daily       traZODone (DESYREL) 100 MG tablet Take 100 mg by mouth nightly. Indications: 2 tabs nightly       No current facility-administered medications on file prior to encounter. Review of Systems   Constitutional: Negative for chills and fever. HENT: Negative for congestion, ear pain, rhinorrhea, sore throat and trouble swallowing. Respiratory: Positive for apnea (Hx of sleep apnea), cough and shortness of breath. Negative for wheezing. Cardiovascular: Negative for chest pain, palpitations and leg swelling (+ Right foot swelling). Gastrointestinal: Negative for abdominal pain, anal bleeding, blood in stool, constipation, diarrhea, nausea and vomiting. Genitourinary: Negative for dysuria and frequency. Musculoskeletal: Positive for gait problem. Skin: Positive for rash (Hx of eczema per patient). Negative for color change (States b/l feet become cold, but do not change color) and wound (Denies any open wounds, admits to some minor injuries to b/l arms r/t cats- see physical exam). Neurological: Positive for dizziness (Denies currently, hx of), numbness (Left thigh since last vascular surgery- states known issue) and headaches. Negative for seizures and syncope. Hematological: Bruises/bleeds easily (+ bruising easily- more on arms). GENERAL PHYSICAL EXAM     Vitals: /70   Pulse 100 Comment: Per auscultation  Temp 97.9 °F (36.6 °C)   Resp 20   Ht 5' 6\" (1.676 m)   Wt 172 lb (78 kg)   LMP 01/01/1996   SpO2 95%   BMI 27.76 kg/m²               Physical Exam  Vitals reviewed. Constitutional:       General: She is not in acute distress. Appearance: She is well-developed.  She is not ill-appearing, toxic-appearing or diaphoretic. HENT:      Head: Normocephalic. Right Ear: External ear normal.      Left Ear: External ear normal.      Nose: Nose normal.      Mouth/Throat:      Dentition: Abnormal dentition (States teeth loose on top). Has dentures (Bottom). Dental caries present. Pharynx: No oropharyngeal exudate or posterior oropharyngeal erythema. Tonsils: No tonsillar abscesses. Eyes:      General:         Right eye: No discharge. Left eye: No discharge. Conjunctiva/sclera: Conjunctivae normal.      Pupils: Pupils are equal, round, and reactive to light. Neck:      Vascular: No carotid bruit. Cardiovascular:      Rate and Rhythm: Regular rhythm. Tachycardia present. Pulses:           Radial pulses are 2+ on the right side and 2+ on the left side. Femoral pulses are 1+ on the right side and 0 on the left side. Dorsalis pedis pulses are 1+ on the right side and 1+ on the left side. Posterior tibial pulses are 0 on the right side and 0 on the left side. Heart sounds: Normal heart sounds. Pulmonary:      Effort: Pulmonary effort is normal. No accessory muscle usage or respiratory distress. Breath sounds: Normal breath sounds. No decreased breath sounds, wheezing, rhonchi or rales. Comments: Occasional, harsh cough noted. Abdominal:      General: Bowel sounds are normal. There is no distension or abdominal bruit. Palpations: Abdomen is soft. There is no mass or pulsatile mass. Tenderness: There is no abdominal tenderness. There is no guarding or rebound. Musculoskeletal:      Right lower leg: No swelling or tenderness. No edema. Left lower leg: No swelling or tenderness. No edema. Right foot: Normal capillary refill. No swelling. Left foot: Normal capillary refill. Deformity (Partial amputation left 5th toe) present. No swelling. Comments: Negative Catrachita's sign b/l.    Feet:      Left foot:      Skin integrity: Callus present. Comments: Her feet are warm and adequately perfused without ulceration or gangrene. She has minimal dependent rubor. She has no hair growth on the toes. Sensation intact to light touch to b/l feet. Lymphadenopathy:      Cervical: No cervical adenopathy. Skin:     General: Skin is warm and dry. Findings: Bruising (Right f/a) present. Comments: Scabbing to b/l forearms- patient states r/t her cat. Linear scaring to left forearm. Arnaldo Falling, irregular shaped, papular lesion to left upper cheek- approximately half dollar sized (states hx of bx, benign). Scattered well healed surgical scaring to ABD. Neurological:      Mental Status: She is alert and oriented to person, place, and time. Psychiatric:         Behavior: Behavior normal.       LAB REVIEW     Lab Results   Component Value Date     06/16/2022    K 4.4 06/16/2022     06/16/2022    CO2 25 06/16/2022    BUN 19 06/16/2022    CREATININE 1.04 (H) 06/16/2022    GLUCOSE 207 (H) 06/16/2022    CALCIUM 9.7 06/16/2022    PROT 7.4 06/16/2022    LABALBU 4.2 06/16/2022    BILITOT 0.16 (L) 06/16/2022    ALKPHOS 95 06/16/2022    AST 18 06/16/2022    ALT 15 06/16/2022    LABGLOM 53 (L) 06/16/2022    GFRAA >60 06/16/2022    GLOB NOT REPORTED 09/27/2021     Lab Results   Component Value Date    WBC 9.7 06/16/2022    HGB 14.2 06/16/2022    HCT 42.3 06/16/2022    MCV 91.4 06/16/2022     06/16/2022     Lab Results   Component Value Date    APTT 25.7 06/16/2022     Lab Results   Component Value Date    INR 0.9 06/16/2022    INR 1.0 02/14/2016    PROTIME 11.8 06/16/2022    PROTIME 10.0 02/14/2016     Lab Results   Component Value Date    MG 2.1 09/27/2021     PRELIMINARY EKG REVIEW, DATE: 6-16-22     SINUS TACHYCARDIA  RIGHT ATRIAL ENLARGEMENT  BORDERLINE ECG  105 BPM    Advised patient to discuss EKG findings w/PCP.   SURGERY / PROVISIONAL DIAGNOSES:      RIGHT GROIN RECONSTRUCTION OF FEMORAL TO FEMORAL BYPASS W/ ENDARTECTOMY    BILATERAL LOWER EXTREMITY CLAUDICATION    Patient Active Problem List    Diagnosis Date Noted    Preop examination 06/16/2022    Ataxia 06/16/2022    Cerebral infarction ([facility] 06/16/2022    Nicotine dependence 06/16/2022    Atherosclerosis of native artery of both lower extremities with intermittent claudication ([address] 05/16/2022    Lumbar facet arthropathy     Claudication ([address] 08/07/2019    Secondary diabetes mellitus with stage 3 chronic kidney disease ([address] 03/12/2019    Chronic kidney disease, stage III (moderate) ([address] 01/17/2019    History of amputation 01/17/2019    Poor circulation 01/17/2019    Kidney stone 01/17/2019    History of suicide attempt 07/02/2018    Secondary diabetes mellitus ([address] 05/17/2018    Stenosis of carotid artery 07/03/2017    Bilateral carotid artery occlusion 07/03/2017    Bilateral carotid artery stenosis 07/03/2017    Low back pain potentially associated with radiculopathy 08/16/2016    Hyperlipidemia 03/08/2016    CKD (chronic kidney disease) stage 3, GFR 30-59 ml/min ([facility])     Aortic aneurysm ([address]     Nephrolith     Essential hypertension 09/17/2013    Depressive disorder 09/17/2013    Major depression in partial remission ([address] 09/17/2013    Suicidal thoughts 09/17/2013         CLEARANCE: Dr. Fiona Church, anesthesia, was contacted and informed of patient's complex medical history and planned surgery. CARDIAC clearance ONLY requested. Surgery scheduling will notify Dr. Ian Lunsford office who will be responsible for making sure the clearance is obtained and is in the chart for surgery. I did personally call Sharmin Garcia PA-C's office to request she place an order for cardiology referral, advised patient to f/u as well.     Total time spent on encounter- PAT provider minutes: 31-40 minutes     STELLA Gonzalez CNP on 6/17/2022 at 7:11 AM

## 2022-06-16 NOTE — H&P
HISTORY and Treshalonda Tang [id number]       NAME:  Marilyn Cordero  MRN: [id number]   YOB: 1960   Date: 6/17/2022   Age: 57 y.o. Gender: female     COMPLAINT AND PRESENT HISTORY:   Marilyn Cordeor is 57 y.o.,  female, presents for pre-anesthesia/admission testing for RIGHT GROIN RECONSTRUCTION OF FEMORAL TO FEMORAL BYPASS W/ ENDARTECTOMY per Dr. Ferd Lennox. Primary dx: BILATERAL LOWER EXTREMITY CLAUDICATION. HPI:  SEE PORTION OF NOTE BELOW PER DR. Aguilar, 6-13-22 (REVIEWED):  HPI:  Marilyn Cordero is a 57 y.o. female who returns to the office for bilateral lower extremity short distance claudication. Recall that she had an aortoiliac bypass with an endograft for aneurysmal disease. The left limb of that graft went down in 2014 requiring a femorofemoral bypass graft. The femorofemoral bypass graft has worked to my knowledge well but recently she is developing some narrowing in the right groin which is the donor artery. She has an extremely elevated velocity on that side measuring approximately 5 to 600 cm/s just proximal to the takeoff of the femorofemoral bypass graft. The anastomosis is not narrowed. It also appears to be dissected. The left side is widely patent. The iliac limb of the aortobiiliac endograft appears to be widely patent. She does have a palpable pulse in the right common femoral artery above the level of the femorofemoral bypass graft. The patient has never had ulceration. She denies rest pain. She continues to smoke cigarettes despite warnings to the contrary. She used to smoke about 2 packs/day and now she is smoking approximately three quarters of a pack. Her  has just quit smoking 3 weeks ago. She had an ALEXANDRA measuring pressures corresponding to 0.6 and 0.7 bilaterally. She is diabetic. She does have high blood pressure and high cholesterol. Plan:   At this point I think the best option is to go ahead with right common femoral reconstruction with removal of the london of the graft and replacement of the london of the femorofemoral bypass graft after endarterectomy. I think that there is a dissected piece of plaque in that location causing significant flow disturbances and significant velocity elevations. She has no femoral pulse on the left side despite widely patent anastomoses bilaterally. The worst scenario would be for this graft to go down due to a common femoral stenosis at which point I think that the aortobiiliac bypass graft would also thrombose given its very poor outflow. At that point she would have significant lower extremity problems probably including rest pain and this would be a limb salvage issue. She understands and agrees and also understands that there are risks to these procedures including but not limited to bleeding, infection, hematoma, nerve damage, permanent neurologic disability, limb loss, renal failure or insufficiency, stroke, heart attack and death. She agrees to proceed. I think that she should have an EKG as well as a comprehensive metabolic panel, CBC with differential, PT/INR and type and screen. In addition I think she should have an echocardiogram to see what her ejection fraction is and cardiac function. The diagnosis above of chest pain due to coronary artery disease is not correct. She does not have chest pain with exertion however she cannot walk very far. Her walking distance is approximately 50 feet. We will get the studies and see her in the operating room for right groin reconstruction in the near future. Patient with significant vascular surgery, first dating back to 2010 which was done at [facility] per prior chart review.  Prior vascular records were reviewed from [facility], as well as [facility] (available in media)- prior vascular surgeries updated w/detail and exact dates as available (patient called post-PAT appointment on 6-16-22 to review updated vascular surgeries and confirm). Copied below (also noted in surgical hx):  VASCULAR SURGERY  01/10/2010 B/L ILIAC EMBOLECTOMY / BALLOON ANGIOPLASTY AND STENTING OF B/L COMMON ILIAC ARTERY / PATCH ANGIOPLASTY OF RIGHT COMMON FEMORAL ARTERY / COMPLETION ANGIOGRAM (DR. Yao [facility] 8368)    VASCULAR SURGERY  09/01/2013 BILATERAL ILIAC ARTERY THROMBECTOMY / AORTOILIAC ANGIOGRAM / ANGIOPLASTY AND STENTING OF ABD AORTA AND KISSING AND THE B/L COMMON ILIAC ARTERY STENTING / ANGIOPLASTY AND STENTING OF THE LEFT COMMON ILIAC ARTERY ([facility]- DR. JACKSON)    VASCULAR SURGERY  10/16/2013 [id number] ANGIOGRAM / DEPLOYMENT OF ENDURANT [id number] STENT GRAFT / EMBOLIZATION OF LEFT COMMON ILIAC ARTERY W/AMPLATZER PLUG / FEMORAL TO FEMORAL BYPASS GRAFT USING 8 MM RINGED GRAFT / SELECTIVE RIGHT LOWER EXTREMITY ANGIOGRAM / ACCESS OF THE LEFT BRACHIAL ARTERY / STENTING OF RIGHT EXTERNAL ILIAC ARTERY ([facility]- DR. JACKSON)      Testing completed r/t condition: (See chart for additional testing)  Narrative       [address] [place] [address]    Vascular Lower Arterial Plethysmography Procedure        Patient Name Jovanny Pandey     Date of Study           06/06/2022                  SARAH WOODY        Date of Birth 1960  Gender                  Female        Age           62 year(s)  Race                            Room Number        Corporate ID  [id number]    #        Patient Acct [de-identified]    #        MR #          [id number]      Sonographer             Carlos Alberto Vital        Accession #   [id number]  Interpreting Physician  Hugo Aguilar        Referring                 Referring Physician     Ha Bryant., MD    Nurse    Practitioner       Procedure   Type of Study:        Extremities Arteries: Lower Arterial Plethysmography, PVR Lower.       Indications for Study:Claudication.       Patient Status:Out Patient.    Technical Quality:Adequate visualization.        Conclusions        Summary        Bilateral:    Abnormal ALEXANDRA consistent with mild to moderate arterial disease.    The PVR waveform is consistent with a patent femoropopliteal system    bilaterally. Suspect inflow disease.        Signature        ----------------------------------------------------------------    Electronically signed by Chetan Kaiser(Sonographer) on    06/06/2022 11:16 AM    ----------------------------------------------------------------        ----------------------------------------------------------------    Electronically signed by Rohnda March physician)   Dick Becerril 06/06/2022 12:46 PM    ----------------------------------------------------------------       Findings:        Right Impression:                    Left Impression:    Moderately abnormal PVR waveform     Moderately abnormal PVR waveform    contour at the level of the thigh,   contour at the level of the thigh,    calf, and ankle.                     calf, and ankle.        Calf waveform augmentation noted.    Calf waveform augmentation noted.       Velocities are measured in cm/s ; Diameters are measured in cm       Pressures   +--------------------------------------++--------+-----+----+--------+-----+   !                                      ! ! Right   !     !Left!        !     !   +--------------------------------------++--------+-----+----+--------+-----+   ! Location                              ! ! Pressure! Ratio!    !Pressure! Ratio! +--------------------------------------++--------+-----+----+--------+-----+   ! Calf                                  !!89      !0.66 !    !97      !0.69 !   +--------------------------------------++--------+-----+----+--------+-----+   ! Ankle PT                              !!94      !0.7  !   Juliane      !0.7  !   +--------------------------------------++--------+-----+----+--------+-----+   ! Ankle DP                              !!96      !0.71 !    !92      !0.68 !   +--------------------------------------++--------+-----+----+--------+-----+   ! Great Toe                             !!46      !0.39 !   Porshabrittanyreyna      !0.47 !   +--------------------------------------++--------+-----+----+--------+-----+         - Brachial Pressure:Right: 133. Left:135.         - ALEXANDRA:Right: 0.71. Left: 0.7.       Plethysmographic Digit Evaluation   +---------++--------+-----+---------------++--------+-----+----------------+   !         ! ! Right   !     ! Left           !!        !     !                !   +---------++--------+-----+---------------++--------+-----+----------------+   ! Location ! !Pressure! Ratio! PPG Wave Form  !!Pressure! Ratio! PPG Wave Form   !   +---------++--------+-----+---------------++--------+-----+----------------+   ! Great Toe!!52      !0.39 !               !!61      !0.47 !                !   +---------++--------+-----+---------------++--------+-----+----------------+         Dioni       [address] [place] [address] Lower Extremities Arterial Duplex Procedure        Patient Name Jovanny Pandey     Date of Study           06/06/2022                  SARAH WOODY        Date of Birth 1960  Gender                  Female        Age           62 year(s)  Race                            Room Number        Corporate ID  [id number]    #        Patient Acct [de-identified]    #        MR #          [id number]      Sonographer             Christopher Presume        Accession #   [id number]  Interpreting Physician  Hugo Aguilar        Referring                 Referring Physician     Benny Byers MD    Nurse    Practitioner       Procedure   Type of Study:        Extremities Arteries: Grafts Survey, Arterial Bypass Graft Lwr Bilat,    Lower Extremities Arterial Duplex, Arterial Scan Lower Right.       Indications for Study:Follow-up of arterial bypass graft.       Patient Status:Out Patient.    Technical Quality:Adequate visualization.       Comments:Patent right to left fem - fem bypass graft        Conclusions        Summary        The bypass graft is seeming to originate from the Proximal SFA which is    diseased and has a very high velocity consistent with significant    stenosis. It is possible that it is originating from the common femoral    which is dissected causing this flow disturbance. Clinical correlation is    mandatory. The bypass is widely patent without significant stenosis.        Signature        ----------------------------------------------------------------    Electronically signed by Chetan Jones(Sonographer) on    06/06/2022 11:11 AM    ----------------------------------------------------------------        ----------------------------------------------------------------    Electronically signed by Jeannine Michel physician)   Marta Barlow 06/06/2022 12:52 PM    ----------------------------------------------------------------       Findings:        Right Impression:    Proximal SFA ( pre proximal anastamosis ) with plaque and significant    color flow turbulence.        P1 - 468/70 Inflow SFA    P2 - 150/19    A1 - 227/24 Prox anastomosis    A2 - 125/19 Right    G1 - 66/13    G2 - 61/18 Graft    G3 - 75/13    A3 - 79/15 Distal anastomosis    A4 - 164/30 Left    D1 - 71/0    D2 - 74/0 Outflow             Review of additional significant medical hx:  AA, HLD, HTN, PVD: She does not see a cardiologist. Denies current/recent chest pain, palpitations, leg swelling. + SOB depending on activity, intermittently. + dizzy spells- intermittent (denies currently, states she believes her PCP is aware). States right foot is swollen at times, otherwise denies leg swelling. States she is getting a h/a currently, states very mild- not worst h/a ever. Per prior record review, patient underwent a normal stress test at [facility] in 2013 (placed on surgery chart).  Patient is scheduled for her ECHO this Saturday at [facility].  Current medications r/t condition: Losartan, Toprol XL, simvastatin, ASA    HOLTER MONITOR, 11-8-16:  Test type:  24-hour Holter Monitor     Indication: PALPITATIONS     Interpretation:     The prevailing rhythm was sinus tachycardia with an average heart rate of  107bpm and a maximum heart rate of 149bpm.  There were intermittent  episodes of sinus tachycardia. There were no premature ventricular  contractions. There was one single premature atrial contraction. Sinus  rhythm and sinus tachycardia were present during various complaints of  \"dizzy, slight flutter in chest.\"  Sinus rhythm with a premature atrial  contraction was present when patient complained of \"heart flutter\".     Impression:        1. SINUS TACHYCARDIA. 2. SINUS RHYTHM. 3. PREMATURE ATRIAL CONTRACTION, ONLY ONE ISOLATED PREMATURE ATRIAL     CONTRACTION. TTE procedure:2D Echocardiogram, M-Mode, Doppler, Color Doppler. Procedure Date  Date: 09/24/2015 Start: 07:42 AM     Study Location: [address] [place] [address]  Technical Quality: Poor visualization due to lung interference.     Indications:Dizziness.     History / Tech. Comments:  smoker     Patient Status: Outpatient     Height: 66 inches Weight: 174 pounds BSA: 1.89 m^2 BMI: 28.08 kg/m^2     Rhythm: Sinus tachycardia HR: 108 bpm     CONCLUSIONS     Summary  Technically difficult study  Left ventricle is normal in size and wall thickness. Global left ventricular systolic function is normal.  Estimated ejection fraction is 65 % . No significant wall motion abnormality seen. RIght side is mildly dilated. Mild tricuspid regurgitation. Estimated right ventricular systolic pressure is 36 mmHg. No significant pericardial effusion is seen.     Signature  ----------------------------------------------------------------------------   Electronically signed by Cassie Melchor(Sonographer) on 09/24/2015 08:32   AM    Hx of blood clots: Patient states she thinks she has a hx of blood clots to b/l LE's- unsure if DVT's or superficial she's not sure when the last one was- possibly r/t last vascular surgery.  Patient denies any known personal/family hx of clotting d/o. Current medications r/t condition: ASA    Prediabetes: States she only checks her blood sugar when she \"feels bad\", excessively shaking/sweating. Current medications r/t condition: Glimepiride  Lab Results   Component Value Date    LABA1C 6.6 01/18/2022     Lab Results   Component Value Date     06/15/2020     Stroke: Patient states she believes she had several mini strokes in the past (2017)- denies any known deficits. No longer following w/neurology (previously following w/ [facility]). COPD, MAMIE: She does not f/u with pulmonology. She is a long term smoker- decreased recently to 3 quarters of a pack. + SOB intermittently, + \"smoker's cough\"- productive/phlegm (not new), denies wheezing. States machine was never recommended for sleep apnea. Patient denies any hx of utilizing home O2. Current medications r/t condition: Incruse    CKD: Following w/nephrology- Dr. Dione Rose.  Lab Results   Component Value Date    CREATININE 1.04 (H) 06/16/2022    BUN 19 06/16/2022     06/16/2022    K 4.4 06/16/2022     06/16/2022    CO2 25 06/16/2022     Activity level: Does not do any routine exercise, but she is active. Functional Capacity per patient:              1. Patient IS NOT able to walk 2 city blocks on level ground without SOB. 2. Patient IS NOT able to climb 2 flights of stairs without SOB. Denies hx of MRSA infection. Denies hx of any personal or family hx of complications w/anesthesia. PAST MEDICAL HISTORY     Past Medical History:   Diagnosis Date    AA (aortic aneurysm) ([address]     MILD SUPRA-RENAL    Anxiety     Atherosclerosis of native artery of both lower extremities with intermittent claudication ([address] 05/16/2022    Blood clot in vein     Bronchitis     Cerebral infarction ([address]     several \"mini strokes\" on Feb 14 (about 2017? )- previously saw Dr. Marino Boykin (neurology)    Cervical cancer [facility])     Cervix no chemo or radiation    CKD (chronic kidney disease) stage 3, GFR 30-59 ml/min ([facility])     Dr. Raymondo Galeazzi- nephrologist    COPD (chronic obstructive pulmonary disease) ([facility]     Depression     Diabetes mellitus ([facility]     Dizziness     Eczema     Hx of blood clots     Hyperlipidemia     Hyperlipidemia     Hypertension     Insomnia     Limb ischemia     Loose, teeth     Nephrolith     Patient denies    MAMIE (obstructive sleep apnea)     \"years ago had sleep study, never used machine\"    Peripheral vascular disease ([facility]     Stenosis of carotid artery 07/03/2017    Suicide attempt ([facility]     2012 x2 with pills (per prior record review, was admitted to [facility] 9-1-13 w/suicide attempt)    Wears dentures     Bottom       SURGICAL HISTORY       Past Surgical History:   Procedure Laterality Date    APPENDECTOMY      CATARACT REMOVAL  09/26/2019    CATARACT REMOVAL WITH IMPLANT  09/12/2019    CHOLECYSTECTOMY      HYSTERECTOMY (CERVIX STATUS UNKNOWN)  1995    TOTAL/CERVICAL CANCER    KNEE ARTHROSCOPY Left     OVARY REMOVAL      SKIN BIOPSY      TOE AMPUTATION Left 12/21/2015    5TH PARTIAL-baby toe    VASCULAR SURGERY  09/01/2013    BILATERAL ILIAC ARTERY THROMBECTOMY / AORTOILIAC ANGIOGRAM / ANGIOPLASTY AND STENTING OF ABD AORTA AND KISSING AND THE B/L COMMON ILIAC ARTERY STENTING / ANGIOPLASTY AND STENTING OF THE LEFT COMMON ILIAC ARTERY ([facility]- DR. JACKSON)    VASCULAR SURGERY  10/16/2013    [id number] ANGIOGRAM / DEPLOYMENT OF ENDURANT [id number] STENT GRAFT / EMBOLIZATION OF LEFT COMMON ILIAC ARTERY W/AMPLATZER PLUG / FEMORAL TO FEMORAL BYPASS GRAFT USING 8 MM RINGED GRAFT / SELECTIVE RIGHT LOWER EXTREMITY ANGIOGRAM / ACCESS OF THE LEFT BRACHIAL ARTERY / STENTING OF RIGHT EXTERNAL ILIAC ARTERY ([facility]- DR. JACKSON)    VASCULAR SURGERY  01/10/2010    B/L ILIAC EMBOLECTOMY / BALLOON ANGIOPLASTY AND STENTING OF B/L COMMON ILIAC ARTERY / PATCH ANGIOPLASTY OF RIGHT COMMON FEMORAL ARTERY / COMPLETION ANGIOGRAM ( [address])       SOCIAL HISTORY       Social History     Socioeconomic History    Marital status:      Spouse name: None    Number of children: None    Years of education: None    Highest education level: None   Occupational History    Occupation: disability   Tobacco Use    Smoking status: Current Every Day Smoker     Packs/day: 1.50     Years: 20.00     Pack years: 30.00     Types: Cigarettes     Start date: 1977    Smokeless tobacco: Never Used    Tobacco comment: \"Trying to quit\"- down to less than 3 quarters of a pack as of 6-16-22   Vaping Use    Vaping Use: Some days    Substances: Never   Substance and Sexual Activity    Alcohol use: Not Currently     Comment: Quit 8-30-13    Drug use: No    Sexual activity: Not Currently   Other Topics Concern    None   Social History Narrative    None     Social Determinants of Health     Financial Resource Strain:     Difficulty of Paying Living Expenses: Not on file   Food Insecurity:     Worried About Running Out of Food in the Last Year: Not on file    Radha of Food in the Last Year: Not on file   Transportation Needs:     Lack of Transportation (Medical): Not on file    Lack of Transportation (Non-Medical):  Not on file   Physical Activity: Inactive    Days of Exercise per Week: 0 days    Minutes of Exercise per Session: 0 min   Stress:     Feeling of Stress : Not on file   Social Connections:     Frequency of Communication with Friends and Family: Not on file    Frequency of Social Gatherings with Friends and Family: Not on file    Attends Yarsani Services: Not on file    Active Member of Clubs or Organizations: Not on file    Attends Club or Organization Meetings: Not on file    Marital Status: Not on file   Intimate Partner Violence:     Fear of Current or Ex-Partner: Not on file    Emotionally Abused: Not on file    Physically Abused: Not on file    Sexually Abused: Not on file   Housing Stability:  Unable to Pay for Housing in the Last Year: Not on file    Number of Places Lived in the Last Year: Not on file    Unstable Housing in the Last Year: Not on file       REVIEW OF SYSTEMS    No Known Allergies    Current Outpatient Medications on File Prior to Encounter   Medication Sig Dispense Refill    Omega-3 Fatty Acids ([address]) 1200 MG CAPS Take by mouth daily      vitamin D (ERGOCALCIFEROL) 1.25 MG ([address] UT) CAPS capsule Take 1 capsule by mouth once a week 4 capsule 5    losartan (COZAAR) 50 MG tablet TAKE ONE TABLET BY MOUTH DAILY (Patient taking differently: at bedtime TAKE ONE TABLET BY MOUTH DAILY) 90 tablet 1    famotidine (PEPCID) 20 MG tablet TAKE ONE TABLET BY MOUTH ONCE NIGHTLY 90 tablet 1    metoprolol succinate (TOPROL XL) 25 MG extended release tablet TAKE ONE TABLET BY MOUTH DAILY (Patient taking differently: at bedtime TAKE ONE TABLET BY MOUTH DAILY) 90 tablet 2    glimepiride (AMARYL) 4 MG tablet TAKE ONE TABLET BY MOUTH EVERY MORNING AT BREAKFAST AND TAKE ONE TABLET BY MOUTH DAILY WITH DINNER 180 tablet 2    Umeclidinium Bromide (INCRUSE ELLIPTA) 62.5 MCG/INH AEPB INHALE ONE PUFF BY MOUTH DAILY 3 each 3    simvastatin (ZOCOR) 40 MG tablet TAKE ONE TABLET BY MOUTH ONCE NIGHTLY 90 tablet 2    gabapentin (NEURONTIN) 100 MG capsule Take 1 capsule by mouth 3 times daily for 180 days. 270 capsule 1    [DISCONTINUED] simvastatin (ZOCOR) 40 MG tablet TAKE ONE TABLET BY MOUTH ONCE NIGHTLY 90 tablet 0    [DISCONTINUED] glimepiride (AMARYL) 4 MG tablet TAKE ONE TABLET BY MOUTH EVERY MORNING AT BREAKFAST AND TAKE 1 TABLET BY MOUTH DAILY AT DINNER 180 tablet 0    [DISCONTINUED] losartan (COZAAR) 50 MG tablet TAKE ONE TABLET BY MOUTH DAILY 90 tablet 0    fluocinonide (LIDEX) 0.05 % ointment Apply topically 2 times daily Apply topically 2 times daily.       acetaminophen (TYLENOL) 500 MG tablet Take 500 mg by mouth as needed for Pain      ARIPiprazole (ABILIFY) 5 MG tablet 5 mg daily  ONETOUCH DELICA LANCETS 90W MISC Check BS bid and prn 100 each 5    ONE TOUCH ULTRA TEST strip USE ONE STRIP TO TEST TWICE A  strip 1    aspirin EC 81 MG EC tablet Take 1 tablet by mouth daily 128 tablet 0    FETZIMA 120 MG CP24 ER capsule Take 120 mg by mouth daily       traZODone (DESYREL) 100 MG tablet Take 100 mg by mouth nightly. Indications: 2 tabs nightly       No current facility-administered medications on file prior to encounter. Review of Systems   Constitutional: Negative for chills and fever. HENT: Negative for congestion, ear pain, rhinorrhea, sore throat and trouble swallowing. Respiratory: Positive for apnea (Hx of sleep apnea), cough and shortness of breath. Negative for wheezing. Cardiovascular: Negative for chest pain, palpitations and leg swelling (+ Right foot swelling). Gastrointestinal: Negative for abdominal pain, anal bleeding, blood in stool, constipation, diarrhea, nausea and vomiting. Genitourinary: Negative for dysuria and frequency. Musculoskeletal: Positive for gait problem. Skin: Positive for rash (Hx of eczema per patient). Negative for color change (States b/l feet become cold, but do not change color) and wound (Denies any open wounds, admits to some minor injuries to b/l arms r/t cats- see physical exam). Neurological: Positive for dizziness (Denies currently, hx of), numbness (Left thigh since last vascular surgery- states known issue) and headaches. Negative for seizures and syncope. Hematological: Bruises/bleeds easily (+ bruising easily- more on arms). GENERAL PHYSICAL EXAM     Vitals: /70   Pulse 100 Comment: Per auscultation  Temp 97.9 °F (36.6 °C)   Resp 20   Ht 5' 6\" (1.676 m)   Wt 172 lb (78 kg)   LMP 01/01/1996   SpO2 95%   BMI 27.76 kg/m²               Physical Exam  Vitals reviewed. Constitutional:       General: She is not in acute distress. Appearance: She is well-developed.  She is not ill-appearing, toxic-appearing or diaphoretic. HENT:      Head: Normocephalic. Right Ear: External ear normal.      Left Ear: External ear normal.      Nose: Nose normal.      Mouth/Throat:      Dentition: Abnormal dentition (States teeth loose on top). Has dentures (Bottom). Dental caries present. Pharynx: No oropharyngeal exudate or posterior oropharyngeal erythema. Tonsils: No tonsillar abscesses. Eyes:      General:         Right eye: No discharge. Left eye: No discharge. Conjunctiva/sclera: Conjunctivae normal.      Pupils: Pupils are equal, round, and reactive to light. Neck:      Vascular: No carotid bruit. Cardiovascular:      Rate and Rhythm: Regular rhythm. Tachycardia present. Pulses:           Radial pulses are 2+ on the right side and 2+ on the left side. Femoral pulses are 1+ on the right side and 0 on the left side. Dorsalis pedis pulses are 1+ on the right side and 1+ on the left side. Posterior tibial pulses are 0 on the right side and 0 on the left side. Heart sounds: Normal heart sounds. Pulmonary:      Effort: Pulmonary effort is normal. No accessory muscle usage or respiratory distress. Breath sounds: Normal breath sounds. No decreased breath sounds, wheezing, rhonchi or rales. Comments: Occasional, harsh cough noted. Abdominal:      General: Bowel sounds are normal. There is no distension or abdominal bruit. Palpations: Abdomen is soft. There is no mass or pulsatile mass. Tenderness: There is no abdominal tenderness. There is no guarding or rebound. Musculoskeletal:      Right lower leg: No swelling or tenderness. No edema. Left lower leg: No swelling or tenderness. No edema. Right foot: Normal capillary refill. No swelling. Left foot: Normal capillary refill. Deformity (Partial amputation left 5th toe) present. No swelling. Comments: Negative Catrachita's sign b/l.    Feet:      Left foot:      Skin integrity: Callus present. Comments: Her feet are warm and adequately perfused without ulceration or gangrene. She has minimal dependent rubor. She has no hair growth on the toes. Sensation intact to light touch to b/l feet. Lymphadenopathy:      Cervical: No cervical adenopathy. Skin:     General: Skin is warm and dry. Findings: Bruising (Right f/a) present. Comments: Scabbing to b/l forearms- patient states r/t her cat. Linear scaring to left forearm. Doris Sable, irregular shaped, papular lesion to left upper cheek- approximately half dollar sized (states hx of bx, benign). Scattered well healed surgical scaring to ABD. Neurological:      Mental Status: She is alert and oriented to person, place, and time. Psychiatric:         Behavior: Behavior normal.       LAB REVIEW     Lab Results   Component Value Date     06/16/2022    K 4.4 06/16/2022     06/16/2022    CO2 25 06/16/2022    BUN 19 06/16/2022    CREATININE 1.04 (H) 06/16/2022    GLUCOSE 207 (H) 06/16/2022    CALCIUM 9.7 06/16/2022    PROT 7.4 06/16/2022    LABALBU 4.2 06/16/2022    BILITOT 0.16 (L) 06/16/2022    ALKPHOS 95 06/16/2022    AST 18 06/16/2022    ALT 15 06/16/2022    LABGLOM 53 (L) 06/16/2022    GFRAA >60 06/16/2022    GLOB NOT REPORTED 09/27/2021     Lab Results   Component Value Date    WBC 9.7 06/16/2022    HGB 14.2 06/16/2022    HCT 42.3 06/16/2022    MCV 91.4 06/16/2022     06/16/2022     Lab Results   Component Value Date    APTT 25.7 06/16/2022     Lab Results   Component Value Date    INR 0.9 06/16/2022    INR 1.0 02/14/2016    PROTIME 11.8 06/16/2022    PROTIME 10.0 02/14/2016     Lab Results   Component Value Date    MG 2.1 09/27/2021     PRELIMINARY EKG REVIEW, DATE: 6-16-22     SINUS TACHYCARDIA  RIGHT ATRIAL ENLARGEMENT  BORDERLINE ECG  105 BPM    Advised patient to discuss EKG findings w/PCP.   SURGERY / PROVISIONAL DIAGNOSES:      RIGHT GROIN RECONSTRUCTION OF FEMORAL TO FEMORAL BYPASS W/ ENDARTECTOMY    BILATERAL LOWER EXTREMITY CLAUDICATION    Patient Active Problem List    Diagnosis Date Noted    Preop examination 06/16/2022    Ataxia 06/16/2022    Cerebral infarction ([address] 06/16/2022    Nicotine dependence 06/16/2022    Atherosclerosis of native artery of both lower extremities with intermittent claudication ([address] 05/16/2022    Lumbar facet arthropathy     Claudication ([address] 08/07/2019    Secondary diabetes mellitus with stage 3 chronic kidney disease ([address] 03/12/2019    Chronic kidney disease, stage III (moderate) ([address] 01/17/2019    History of amputation 01/17/2019    Poor circulation 01/17/2019    Kidney stone 01/17/2019    History of suicide attempt 07/02/2018    Secondary diabetes mellitus ([address] 05/17/2018    Stenosis of carotid artery 07/03/2017    Bilateral carotid artery occlusion 07/03/2017    Bilateral carotid artery stenosis 07/03/2017    Low back pain potentially associated with radiculopathy 08/16/2016    Hyperlipidemia 03/08/2016    CKD (chronic kidney disease) stage 3, GFR 30-59 ml/min ([facility])     Aortic aneurysm ([address]     Nephrolith     Essential hypertension 09/17/2013    Depressive disorder 09/17/2013    Major depression in partial remission ([address] 09/17/2013    Suicidal thoughts 09/17/2013         CLEARANCE: Dr. Torito Jacobs, anesthesia, was contacted and informed of patient's complex medical history and planned surgery. CARDIAC clearance ONLY requested. Surgery scheduling will notify Dr. J Carlos Muller office who will be responsible for making sure the clearance is obtained and is in the chart for surgery. I did personally call Lanette Espinosa PA-C's office to request she place an order for cardiology referral, advised patient to f/u as well.     Total time spent on encounter- PAT provider minutes: 31-40 minutes     STELLA Mendoza CNP on 6/17/2022 at 7:11 AM

## 2022-06-17 ENCOUNTER — TELEPHONE (OUTPATIENT)
Dept: VASCULAR SURGERY | Age: 62
End: 2022-06-17

## 2022-06-17 LAB
EKG ATRIAL RATE: 105 BPM
EKG P AXIS: 79 DEGREES
EKG P-R INTERVAL: 168 MS
EKG Q-T INTERVAL: 360 MS
EKG QRS DURATION: 86 MS
EKG QTC CALCULATION (BAZETT): 475 MS
EKG R AXIS: 88 DEGREES
EKG T AXIS: 81 DEGREES
EKG VENTRICULAR RATE: 105 BPM

## 2022-06-17 PROCEDURE — 93010 ELECTROCARDIOGRAM REPORT: CPT | Performed by: INTERNAL MEDICINE

## 2022-06-17 NOTE — TELEPHONE ENCOUNTER
Patient called to cancel surgery due to noting being able to get into cardiology until 6/30/22.  Patient said that she would call the office back if she was able to et in earlier

## 2022-06-18 ENCOUNTER — HOSPITAL ENCOUNTER (OUTPATIENT)
Dept: NON INVASIVE DIAGNOSTICS | Age: 62
Discharge: HOME | End: 2022-06-18
Payer: COMMERCIAL

## 2022-06-18 DIAGNOSIS — Z01.818 PRE-OP TESTING: ICD-10-CM

## 2022-06-18 LAB
LV EF: 63 %
LVEF MODALITY: NORMAL

## 2022-06-18 PROCEDURE — 93306 TTE W/DOPPLER COMPLETE: CPT

## 2022-06-20 ENCOUNTER — TELEPHONE (OUTPATIENT)
Dept: VASCULAR SURGERY | Age: 62
End: 2022-06-20

## 2022-06-20 NOTE — PROGRESS NOTES
Patient had PAT 6-17-22. Cardiac clearance requested. Patient not established with cardiologist. Pt unable to see cardiology until 6/30/22. Patient's surgery is 6-22-22. Case discussed with Dr. Angy Greco including, labs, EKG, Echo, PMH medications and other information. Dr. Angy Greco reports case can proceed without cardiac clearance.

## 2022-06-20 NOTE — TELEPHONE ENCOUNTER
Spoke with Franky at [address] [place] [address] scheduling to tomasz patients appt for 7/6/22 at 9:00 with an arrival tie of 7    Per patient she asked for a time after 7/3, patient was notified that surgery is now on 7/6/22 per her request. Patient expressed understanding.

## 2022-07-05 ENCOUNTER — ANESTHESIA EVENT (OUTPATIENT)
Dept: OPERATING ROOM | Age: 62
DRG: 252 | End: 2022-07-05
Payer: COMMERCIAL

## 2022-07-06 ENCOUNTER — ANESTHESIA (OUTPATIENT)
Dept: OPERATING ROOM | Age: 62
DRG: 252 | End: 2022-07-06
Payer: COMMERCIAL

## 2022-07-06 ENCOUNTER — HOSPITAL ENCOUNTER (INPATIENT)
Age: 62
LOS: 2 days | Discharge: HOME | DRG: 252 | End: 2022-07-08
Attending: SURGERY | Admitting: SURGERY
Payer: COMMERCIAL

## 2022-07-06 DIAGNOSIS — I70.213 ATHEROSCLEROSIS OF NATIVE ARTERY OF BOTH LOWER EXTREMITIES WITH INTERMITTENT CLAUDICATION (HCC): Primary | ICD-10-CM

## 2022-07-06 DIAGNOSIS — I73.9 CLAUDICATION OF LOWER EXTREMITY (HCC): ICD-10-CM

## 2022-07-06 LAB
GLUCOSE BLD-MCNC: 153 MG/DL (ref 65–105)
GLUCOSE BLD-MCNC: 161 MG/DL (ref 65–105)
GLUCOSE BLD-MCNC: 171 MG/DL (ref 65–105)
GLUCOSE BLD-MCNC: 184 MG/DL (ref 65–105)

## 2022-07-06 PROCEDURE — 88304 TISSUE EXAM BY PATHOLOGIST: CPT

## 2022-07-06 PROCEDURE — 3600000012 HC SURGERY LEVEL 2 ADDTL 15MIN: Performed by: SURGERY

## 2022-07-06 PROCEDURE — 6370000000 HC RX 637 (ALT 250 FOR IP)

## 2022-07-06 PROCEDURE — 2709999900 HC NON-CHARGEABLE SUPPLY: Performed by: SURGERY

## 2022-07-06 PROCEDURE — 3700000000 HC ANESTHESIA ATTENDED CARE: Performed by: SURGERY

## 2022-07-06 PROCEDURE — 7100000000 HC PACU RECOVERY - FIRST 15 MIN: Performed by: SURGERY

## 2022-07-06 PROCEDURE — 82947 ASSAY GLUCOSE BLOOD QUANT: CPT

## 2022-07-06 PROCEDURE — 6360000002 HC RX W HCPCS: Performed by: SURGERY

## 2022-07-06 PROCEDURE — 3700000001 HC ADD 15 MINUTES (ANESTHESIA): Performed by: SURGERY

## 2022-07-06 PROCEDURE — 04UK0JZ SUPPLEMENT RIGHT FEMORAL ARTERY WITH SYNTHETIC SUBSTITUTE, OPEN APPROACH: ICD-10-PCS | Performed by: SURGERY

## 2022-07-06 PROCEDURE — 04CK0ZZ EXTIRPATION OF MATTER FROM RIGHT FEMORAL ARTERY, OPEN APPROACH: ICD-10-PCS | Performed by: SURGERY

## 2022-07-06 PROCEDURE — 94640 AIRWAY INHALATION TREATMENT: CPT

## 2022-07-06 PROCEDURE — 2500000003 HC RX 250 WO HCPCS: Performed by: ANESTHESIOLOGY

## 2022-07-06 PROCEDURE — 2000000000 HC ICU R&B

## 2022-07-06 PROCEDURE — 3600000002 HC SURGERY LEVEL 2 BASE: Performed by: SURGERY

## 2022-07-06 PROCEDURE — 6370000000 HC RX 637 (ALT 250 FOR IP): Performed by: SURGERY

## 2022-07-06 PROCEDURE — 6360000002 HC RX W HCPCS: Performed by: ANESTHESIOLOGY

## 2022-07-06 PROCEDURE — 2580000003 HC RX 258: Performed by: ANESTHESIOLOGY

## 2022-07-06 PROCEDURE — 6370000000 HC RX 637 (ALT 250 FOR IP): Performed by: ANESTHESIOLOGY

## 2022-07-06 PROCEDURE — 94664 DEMO&/EVAL PT USE INHALER: CPT

## 2022-07-06 PROCEDURE — 99222 1ST HOSP IP/OBS MODERATE 55: CPT | Performed by: INTERNAL MEDICINE

## 2022-07-06 PROCEDURE — 2580000003 HC RX 258: Performed by: SURGERY

## 2022-07-06 PROCEDURE — 35661 BPG FEMORAL-FEMORAL: CPT | Performed by: SURGERY

## 2022-07-06 PROCEDURE — 7100000001 HC PACU RECOVERY - ADDTL 15 MIN: Performed by: SURGERY

## 2022-07-06 PROCEDURE — C1768 GRAFT, VASCULAR: HCPCS | Performed by: SURGERY

## 2022-07-06 PROCEDURE — 041K0ZH BYPASS RIGHT FEMORAL ARTERY TO RIGHT FEMORAL ARTERY, OPEN APPROACH: ICD-10-PCS | Performed by: SURGERY

## 2022-07-06 DEVICE — XENOSURE BIOLOGIC PATCH, 0.8CM X 8CM, EIFU
Type: IMPLANTABLE DEVICE | Site: GROIN | Status: FUNCTIONAL
Brand: XENOSURE BIOLOGIC PATCH

## 2022-07-06 DEVICE — GRAFT VASC L40CM DIA8MM PTFE CBAS HEP SURF THN WALLED REM: Type: IMPLANTABLE DEVICE | Site: GROIN | Status: FUNCTIONAL

## 2022-07-06 RX ORDER — SODIUM CHLORIDE 9 MG/ML
INJECTION, SOLUTION INTRAVENOUS PRN
Status: DISCONTINUED | OUTPATIENT
Start: 2022-07-06 | End: 2022-07-08 | Stop reason: HOSPADM

## 2022-07-06 RX ORDER — METOPROLOL SUCCINATE 25 MG/1
25 TABLET, EXTENDED RELEASE ORAL DAILY
Status: DISCONTINUED | OUTPATIENT
Start: 2022-07-07 | End: 2022-07-08 | Stop reason: HOSPADM

## 2022-07-06 RX ORDER — DEXAMETHASONE SODIUM PHOSPHATE 4 MG/ML
INJECTION, SOLUTION INTRA-ARTICULAR; INTRALESIONAL; INTRAMUSCULAR; INTRAVENOUS; SOFT TISSUE PRN
Status: DISCONTINUED | OUTPATIENT
Start: 2022-07-06 | End: 2022-07-06 | Stop reason: SDUPTHER

## 2022-07-06 RX ORDER — FENTANYL CITRATE 50 UG/ML
INJECTION, SOLUTION INTRAMUSCULAR; INTRAVENOUS PRN
Status: DISCONTINUED | OUTPATIENT
Start: 2022-07-06 | End: 2022-07-06 | Stop reason: SDUPTHER

## 2022-07-06 RX ORDER — GLIPIZIDE 5 MG/1
5 TABLET ORAL
Status: DISCONTINUED | OUTPATIENT
Start: 2022-07-06 | End: 2022-07-08 | Stop reason: HOSPADM

## 2022-07-06 RX ORDER — ACETAMINOPHEN 500 MG
500 TABLET ORAL EVERY 6 HOURS PRN
Status: DISCONTINUED | OUTPATIENT
Start: 2022-07-06 | End: 2022-07-08 | Stop reason: HOSPADM

## 2022-07-06 RX ORDER — SODIUM CHLORIDE 9 MG/ML
INJECTION, SOLUTION INTRAVENOUS PRN
Status: DISCONTINUED | OUTPATIENT
Start: 2022-07-06 | End: 2022-07-06 | Stop reason: HOSPADM

## 2022-07-06 RX ORDER — ASPIRIN 81 MG/1
81 TABLET ORAL DAILY
Status: DISCONTINUED | OUTPATIENT
Start: 2022-07-06 | End: 2022-07-08 | Stop reason: HOSPADM

## 2022-07-06 RX ORDER — SODIUM CHLORIDE 9 MG/ML
INJECTION, SOLUTION INTRAVENOUS CONTINUOUS PRN
Status: DISCONTINUED | OUTPATIENT
Start: 2022-07-06 | End: 2022-07-06 | Stop reason: SDUPTHER

## 2022-07-06 RX ORDER — FAMOTIDINE 20 MG/1
20 TABLET, FILM COATED ORAL 2 TIMES DAILY
Status: DISCONTINUED | OUTPATIENT
Start: 2022-07-06 | End: 2022-07-08 | Stop reason: HOSPADM

## 2022-07-06 RX ORDER — ONDANSETRON 2 MG/ML
INJECTION INTRAMUSCULAR; INTRAVENOUS PRN
Status: DISCONTINUED | OUTPATIENT
Start: 2022-07-06 | End: 2022-07-06 | Stop reason: SDUPTHER

## 2022-07-06 RX ORDER — MIDAZOLAM HYDROCHLORIDE 1 MG/ML
INJECTION INTRAMUSCULAR; INTRAVENOUS PRN
Status: DISCONTINUED | OUTPATIENT
Start: 2022-07-06 | End: 2022-07-06 | Stop reason: SDUPTHER

## 2022-07-06 RX ORDER — SODIUM CHLORIDE 0.9 % (FLUSH) 0.9 %
5-40 SYRINGE (ML) INJECTION PRN
Status: DISCONTINUED | OUTPATIENT
Start: 2022-07-06 | End: 2022-07-06 | Stop reason: HOSPADM

## 2022-07-06 RX ORDER — OXYCODONE HYDROCHLORIDE 10 MG/1
10 TABLET ORAL EVERY 4 HOURS PRN
Status: DISCONTINUED | OUTPATIENT
Start: 2022-07-06 | End: 2022-07-08 | Stop reason: HOSPADM

## 2022-07-06 RX ORDER — FENTANYL CITRATE 50 UG/ML
25 INJECTION, SOLUTION INTRAMUSCULAR; INTRAVENOUS EVERY 5 MIN PRN
Status: DISCONTINUED | OUTPATIENT
Start: 2022-07-06 | End: 2022-07-06 | Stop reason: HOSPADM

## 2022-07-06 RX ORDER — DIPHENHYDRAMINE HYDROCHLORIDE 50 MG/ML
12.5 INJECTION INTRAMUSCULAR; INTRAVENOUS
Status: DISCONTINUED | OUTPATIENT
Start: 2022-07-06 | End: 2022-07-06 | Stop reason: HOSPADM

## 2022-07-06 RX ORDER — ONDANSETRON 2 MG/ML
4 INJECTION INTRAMUSCULAR; INTRAVENOUS EVERY 6 HOURS PRN
Status: DISCONTINUED | OUTPATIENT
Start: 2022-07-06 | End: 2022-07-08 | Stop reason: HOSPADM

## 2022-07-06 RX ORDER — LIDOCAINE HYDROCHLORIDE 10 MG/ML
1 INJECTION, SOLUTION EPIDURAL; INFILTRATION; INTRACAUDAL; PERINEURAL
Status: DISCONTINUED | OUTPATIENT
Start: 2022-07-06 | End: 2022-07-06 | Stop reason: HOSPADM

## 2022-07-06 RX ORDER — SODIUM CHLORIDE 0.9 % (FLUSH) 0.9 %
5-40 SYRINGE (ML) INJECTION EVERY 12 HOURS SCHEDULED
Status: DISCONTINUED | OUTPATIENT
Start: 2022-07-06 | End: 2022-07-06 | Stop reason: HOSPADM

## 2022-07-06 RX ORDER — SODIUM CHLORIDE AND POTASSIUM CHLORIDE .9; .15 G/100ML; G/100ML
SOLUTION INTRAVENOUS CONTINUOUS
Status: DISCONTINUED | OUTPATIENT
Start: 2022-07-06 | End: 2022-07-07

## 2022-07-06 RX ORDER — ONDANSETRON 2 MG/ML
4 INJECTION INTRAMUSCULAR; INTRAVENOUS
Status: DISCONTINUED | OUTPATIENT
Start: 2022-07-06 | End: 2022-07-06 | Stop reason: HOSPADM

## 2022-07-06 RX ORDER — SODIUM CHLORIDE 0.9 % (FLUSH) 0.9 %
5-40 SYRINGE (ML) INJECTION EVERY 12 HOURS SCHEDULED
Status: DISCONTINUED | OUTPATIENT
Start: 2022-07-06 | End: 2022-07-08 | Stop reason: HOSPADM

## 2022-07-06 RX ORDER — SODIUM CHLORIDE, SODIUM LACTATE, POTASSIUM CHLORIDE, CALCIUM CHLORIDE 600; 310; 30; 20 MG/100ML; MG/100ML; MG/100ML; MG/100ML
INJECTION, SOLUTION INTRAVENOUS CONTINUOUS
Status: DISCONTINUED | OUTPATIENT
Start: 2022-07-06 | End: 2022-07-06 | Stop reason: HOSPADM

## 2022-07-06 RX ORDER — DEXTROSE MONOHYDRATE 50 MG/ML
100 INJECTION, SOLUTION INTRAVENOUS PRN
Status: DISCONTINUED | OUTPATIENT
Start: 2022-07-06 | End: 2022-07-08 | Stop reason: HOSPADM

## 2022-07-06 RX ORDER — LOSARTAN POTASSIUM 50 MG/1
50 TABLET ORAL DAILY
Status: DISCONTINUED | OUTPATIENT
Start: 2022-07-06 | End: 2022-07-08 | Stop reason: HOSPADM

## 2022-07-06 RX ORDER — METOPROLOL SUCCINATE 25 MG/1
25 TABLET, EXTENDED RELEASE ORAL DAILY
Status: DISCONTINUED | OUTPATIENT
Start: 2022-07-06 | End: 2022-07-06

## 2022-07-06 RX ORDER — HYDROMORPHONE HCL 110MG/55ML
PATIENT CONTROLLED ANALGESIA SYRINGE INTRAVENOUS PRN
Status: DISCONTINUED | OUTPATIENT
Start: 2022-07-06 | End: 2022-07-06 | Stop reason: SDUPTHER

## 2022-07-06 RX ORDER — HYDRALAZINE HYDROCHLORIDE 20 MG/ML
10 INJECTION INTRAMUSCULAR; INTRAVENOUS EVERY 6 HOURS PRN
Status: DISCONTINUED | OUTPATIENT
Start: 2022-07-06 | End: 2022-07-08 | Stop reason: HOSPADM

## 2022-07-06 RX ORDER — OXYCODONE HYDROCHLORIDE 5 MG/1
5 TABLET ORAL EVERY 4 HOURS PRN
Status: DISCONTINUED | OUTPATIENT
Start: 2022-07-06 | End: 2022-07-08 | Stop reason: HOSPADM

## 2022-07-06 RX ORDER — ROCURONIUM BROMIDE 10 MG/ML
INJECTION, SOLUTION INTRAVENOUS PRN
Status: DISCONTINUED | OUTPATIENT
Start: 2022-07-06 | End: 2022-07-06 | Stop reason: SDUPTHER

## 2022-07-06 RX ORDER — IPRATROPIUM BROMIDE AND ALBUTEROL SULFATE 2.5; .5 MG/3ML; MG/3ML
1 SOLUTION RESPIRATORY (INHALATION) ONCE
Status: COMPLETED | OUTPATIENT
Start: 2022-07-06 | End: 2022-07-06

## 2022-07-06 RX ORDER — ATORVASTATIN CALCIUM 40 MG/1
40 TABLET, FILM COATED ORAL DAILY
Status: DISCONTINUED | OUTPATIENT
Start: 2022-07-06 | End: 2022-07-08 | Stop reason: HOSPADM

## 2022-07-06 RX ORDER — TRAZODONE HYDROCHLORIDE 100 MG/1
100 TABLET ORAL NIGHTLY
Status: DISCONTINUED | OUTPATIENT
Start: 2022-07-06 | End: 2022-07-08 | Stop reason: HOSPADM

## 2022-07-06 RX ORDER — INSULIN LISPRO 100 [IU]/ML
0-3 INJECTION, SOLUTION INTRAVENOUS; SUBCUTANEOUS NIGHTLY
Status: DISCONTINUED | OUTPATIENT
Start: 2022-07-06 | End: 2022-07-08 | Stop reason: HOSPADM

## 2022-07-06 RX ORDER — PROTAMINE SULFATE 10 MG/ML
INJECTION, SOLUTION INTRAVENOUS PRN
Status: DISCONTINUED | OUTPATIENT
Start: 2022-07-06 | End: 2022-07-06 | Stop reason: SDUPTHER

## 2022-07-06 RX ORDER — SODIUM CHLORIDE 0.9 % (FLUSH) 0.9 %
5-40 SYRINGE (ML) INJECTION PRN
Status: DISCONTINUED | OUTPATIENT
Start: 2022-07-06 | End: 2022-07-08 | Stop reason: HOSPADM

## 2022-07-06 RX ORDER — PHENYLEPHRINE HYDROCHLORIDE 10 MG/ML
INJECTION INTRAVENOUS PRN
Status: DISCONTINUED | OUTPATIENT
Start: 2022-07-06 | End: 2022-07-06 | Stop reason: SDUPTHER

## 2022-07-06 RX ORDER — PROPOFOL 10 MG/ML
INJECTION, EMULSION INTRAVENOUS PRN
Status: DISCONTINUED | OUTPATIENT
Start: 2022-07-06 | End: 2022-07-06 | Stop reason: SDUPTHER

## 2022-07-06 RX ORDER — INSULIN LISPRO 100 [IU]/ML
0-6 INJECTION, SOLUTION INTRAVENOUS; SUBCUTANEOUS
Status: DISCONTINUED | OUTPATIENT
Start: 2022-07-06 | End: 2022-07-08 | Stop reason: HOSPADM

## 2022-07-06 RX ORDER — METOCLOPRAMIDE HYDROCHLORIDE 5 MG/ML
10 INJECTION INTRAMUSCULAR; INTRAVENOUS
Status: DISCONTINUED | OUTPATIENT
Start: 2022-07-06 | End: 2022-07-06 | Stop reason: HOSPADM

## 2022-07-06 RX ORDER — ONDANSETRON 4 MG/1
4 TABLET, ORALLY DISINTEGRATING ORAL EVERY 8 HOURS PRN
Status: DISCONTINUED | OUTPATIENT
Start: 2022-07-06 | End: 2022-07-08 | Stop reason: HOSPADM

## 2022-07-06 RX ORDER — HEPARIN SODIUM 1000 [USP'U]/ML
INJECTION, SOLUTION INTRAVENOUS; SUBCUTANEOUS PRN
Status: DISCONTINUED | OUTPATIENT
Start: 2022-07-06 | End: 2022-07-06 | Stop reason: SDUPTHER

## 2022-07-06 RX ADMIN — PHENYLEPHRINE HYDROCHLORIDE 50 MCG: 10 INJECTION INTRAVENOUS at 11:58

## 2022-07-06 RX ADMIN — SODIUM CHLORIDE, PRESERVATIVE FREE 10 ML: 5 INJECTION INTRAVENOUS at 20:10

## 2022-07-06 RX ADMIN — ROCURONIUM BROMIDE 50 MG: 50 INJECTION, SOLUTION INTRAVENOUS at 10:09

## 2022-07-06 RX ADMIN — LOSARTAN POTASSIUM 50 MG: 50 TABLET, FILM COATED ORAL at 17:11

## 2022-07-06 RX ADMIN — PHENYLEPHRINE HYDROCHLORIDE 50 MCG: 10 INJECTION INTRAVENOUS at 11:49

## 2022-07-06 RX ADMIN — HEPARIN SODIUM 2500 UNITS: 1000 INJECTION INTRAVENOUS; SUBCUTANEOUS at 12:16

## 2022-07-06 RX ADMIN — INSULIN LISPRO 1 UNITS: 100 INJECTION, SOLUTION INTRAVENOUS; SUBCUTANEOUS at 20:08

## 2022-07-06 RX ADMIN — DEXAMETHASONE SODIUM PHOSPHATE 4 MG: 4 INJECTION, SOLUTION INTRAMUSCULAR; INTRAVENOUS at 10:48

## 2022-07-06 RX ADMIN — PHENYLEPHRINE HYDROCHLORIDE 50 MCG: 10 INJECTION INTRAVENOUS at 13:04

## 2022-07-06 RX ADMIN — ONDANSETRON 4 MG: 2 INJECTION INTRAMUSCULAR; INTRAVENOUS at 13:36

## 2022-07-06 RX ADMIN — METOPROLOL SUCCINATE 25 MG: 25 TABLET, EXTENDED RELEASE ORAL at 17:11

## 2022-07-06 RX ADMIN — PROPOFOL 150 MG: 10 INJECTION, EMULSION INTRAVENOUS at 10:09

## 2022-07-06 RX ADMIN — GLIPIZIDE 5 MG: 5 TABLET ORAL at 17:11

## 2022-07-06 RX ADMIN — IPRATROPIUM BROMIDE AND ALBUTEROL SULFATE 1 AMPULE: .5; 2.5 SOLUTION RESPIRATORY (INHALATION) at 08:13

## 2022-07-06 RX ADMIN — PROTAMINE SULFATE 50 MG: 10 INJECTION, SOLUTION INTRAVENOUS at 13:02

## 2022-07-06 RX ADMIN — HYDROMORPHONE HYDROCHLORIDE 0.2 MG: 2 INJECTION, SOLUTION INTRAMUSCULAR; INTRAVENOUS; SUBCUTANEOUS at 13:45

## 2022-07-06 RX ADMIN — PHENYLEPHRINE HYDROCHLORIDE 50 MCG: 10 INJECTION INTRAVENOUS at 11:02

## 2022-07-06 RX ADMIN — OXYCODONE HYDROCHLORIDE 10 MG: 10 TABLET ORAL at 17:11

## 2022-07-06 RX ADMIN — SODIUM CHLORIDE, POTASSIUM CHLORIDE, SODIUM LACTATE AND CALCIUM CHLORIDE: 600; 310; 30; 20 INJECTION, SOLUTION INTRAVENOUS at 08:22

## 2022-07-06 RX ADMIN — MIDAZOLAM 2 MG: 1 INJECTION INTRAMUSCULAR; INTRAVENOUS at 10:02

## 2022-07-06 RX ADMIN — HYDROMORPHONE HYDROCHLORIDE 0.4 MG: 2 INJECTION, SOLUTION INTRAMUSCULAR; INTRAVENOUS; SUBCUTANEOUS at 13:25

## 2022-07-06 RX ADMIN — PROPOFOL 20 MG: 10 INJECTION, EMULSION INTRAVENOUS at 13:45

## 2022-07-06 RX ADMIN — HEPARIN SODIUM 8000 UNITS: 1000 INJECTION INTRAVENOUS; SUBCUTANEOUS at 11:31

## 2022-07-06 RX ADMIN — LIDOCAINE HYDROCHLORIDE 100 MG: 20 INJECTION, SOLUTION EPIDURAL; INFILTRATION; INTRACAUDAL; PERINEURAL at 10:09

## 2022-07-06 RX ADMIN — FAMOTIDINE 20 MG: 20 TABLET ORAL at 20:08

## 2022-07-06 RX ADMIN — ASPIRIN 81 MG: 81 TABLET, COATED ORAL at 17:11

## 2022-07-06 RX ADMIN — PHENYLEPHRINE HYDROCHLORIDE 25 MCG/MIN: 10 INJECTION INTRAVENOUS at 11:31

## 2022-07-06 RX ADMIN — HYDROMORPHONE HYDROCHLORIDE 0.4 MG: 2 INJECTION, SOLUTION INTRAMUSCULAR; INTRAVENOUS; SUBCUTANEOUS at 10:52

## 2022-07-06 RX ADMIN — HYDROMORPHONE HYDROCHLORIDE 0.6 MG: 2 INJECTION, SOLUTION INTRAMUSCULAR; INTRAVENOUS; SUBCUTANEOUS at 11:47

## 2022-07-06 RX ADMIN — INSULIN LISPRO 1 UNITS: 100 INJECTION, SOLUTION INTRAVENOUS; SUBCUTANEOUS at 17:55

## 2022-07-06 RX ADMIN — FENTANYL CITRATE 50 MCG: 50 INJECTION, SOLUTION INTRAMUSCULAR; INTRAVENOUS at 10:40

## 2022-07-06 RX ADMIN — SUGAMMADEX 200 MG: 100 INJECTION, SOLUTION INTRAVENOUS at 13:33

## 2022-07-06 RX ADMIN — SODIUM CHLORIDE: 9 INJECTION, SOLUTION INTRAVENOUS at 10:23

## 2022-07-06 RX ADMIN — FENTANYL CITRATE 50 MCG: 50 INJECTION, SOLUTION INTRAMUSCULAR; INTRAVENOUS at 10:09

## 2022-07-06 RX ADMIN — CEFAZOLIN SODIUM 2000 MG: 10 INJECTION, POWDER, FOR SOLUTION INTRAVENOUS at 10:24

## 2022-07-06 RX ADMIN — PHENYLEPHRINE HYDROCHLORIDE 100 MCG: 10 INJECTION INTRAVENOUS at 12:17

## 2022-07-06 RX ADMIN — ATORVASTATIN CALCIUM 40 MG: 40 TABLET, FILM COATED ORAL at 17:11

## 2022-07-06 RX ADMIN — POTASSIUM CHLORIDE AND SODIUM CHLORIDE: 900; 150 INJECTION, SOLUTION INTRAVENOUS at 17:17

## 2022-07-06 ASSESSMENT — PAIN DESCRIPTION - ORIENTATION: ORIENTATION: RIGHT

## 2022-07-06 ASSESSMENT — PAIN - FUNCTIONAL ASSESSMENT
PAIN_FUNCTIONAL_ASSESSMENT: PREVENTS OR INTERFERES SOME ACTIVE ACTIVITIES AND ADLS
PAIN_FUNCTIONAL_ASSESSMENT: 0-10

## 2022-07-06 ASSESSMENT — PAIN DESCRIPTION - DESCRIPTORS
DESCRIPTORS: SHARP;SHOOTING
DESCRIPTORS: SHARP;SHOOTING

## 2022-07-06 ASSESSMENT — PAIN DESCRIPTION - ONSET: ONSET: ON-GOING

## 2022-07-06 ASSESSMENT — LIFESTYLE VARIABLES: SMOKING_STATUS: 1

## 2022-07-06 ASSESSMENT — PAIN DESCRIPTION - LOCATION: LOCATION: GROIN

## 2022-07-06 ASSESSMENT — PAIN DESCRIPTION - FREQUENCY: FREQUENCY: CONTINUOUS

## 2022-07-06 ASSESSMENT — PAIN SCALES - GENERAL: PAINLEVEL_OUTOF10: 8

## 2022-07-06 ASSESSMENT — PAIN DESCRIPTION - PAIN TYPE: TYPE: SURGICAL PAIN

## 2022-07-06 NOTE — PROGRESS NOTES
Dr. Iman Gipson rounded and left his phone number if needed ([phone]). He stated that she can have a clear liquid diet tonight and advance as tolerated but did not recommend her ambulating tonight. Right femoral dressing can also be changed if saturated with guaze and a transparent cover.

## 2022-07-06 NOTE — ANESTHESIA PRE PROCEDURE
Department of Anesthesiology  Preprocedure Note       Name:  Miriam Hyde   Age:  57 y.o.  :  1960                                          MRN:  [id number]         Date:  2022      Surgeon: Obi Martinez):  Bryan Barnes MD    Procedure: Procedure(s):  RIGHT GROIN RECONSTRUCTION OF FEMORAL TO FEMORAL BYPASS W/ ENDARTECTOMY FEMORAL TO FEMORAL BYPASS    Medications prior to admission:   Prior to Admission medications    Medication Sig Start Date End Date Taking? Authorizing Provider   Omega-3 Fatty Acids (FISH OIL) 1200 MG CAPS Take by mouth daily    Historical Provider, MD   vitamin D (ERGOCALCIFEROL) 1.25 MG (99485 UT) CAPS capsule Take 1 capsule by mouth once a week 3/22/22   Halina Pruitt PA-C   losartan (COZAAR) 50 MG tablet TAKE ONE TABLET BY MOUTH DAILY  Patient taking differently: at bedtime TAKE ONE TABLET BY MOUTH DAILY 22   Halina Pruitt PA-C   famotidine (PEPCID) 20 MG tablet TAKE ONE TABLET BY MOUTH ONCE NIGHTLY 22   Halina Pruitt PA-C   metoprolol succinate (TOPROL XL) 25 MG extended release tablet TAKE ONE TABLET BY MOUTH DAILY  Patient taking differently: at bedtime TAKE ONE TABLET BY MOUTH DAILY 22   Halina Pruitt PA-C   glimepiride (AMARYL) 4 MG tablet TAKE ONE TABLET BY MOUTH EVERY MORNING AT BREAKFAST AND TAKE ONE TABLET BY MOUTH DAILY WITH DINNER 22   Halina Pruitt PA-C   Umeclidinium Bromide (INCRUSE ELLIPTA) 62.5 MCG/INH AEPB INHALE ONE PUFF BY MOUTH DAILY 22   Halina Pruitt PA-C   simvastatin (ZOCOR) 40 MG tablet TAKE ONE TABLET BY MOUTH ONCE NIGHTLY 22   Halina Pruitt PA-C   gabapentin (NEURONTIN) 100 MG capsule Take 1 capsule by mouth 3 times daily for 180 days.  10/17/21 4/15/22  Neil Bryant MD   simvastatin (ZOCOR) 40 MG tablet TAKE ONE TABLET BY MOUTH ONCE NIGHTLY 21   STELLA Cruz CNP   glimepiride (AMARYL) 4 MG tablet TAKE ONE TABLET BY MOUTH EVERY MORNING AT BREAKFAST AND TAKE 1 TABLET BY MOUTH DAILY AT [facility] 4/19/21   STELLA Cronin CNP   losartan (COZAAR) 50 MG tablet TAKE ONE TABLET BY MOUTH DAILY 3/4/21   STELLA Cronin CNP   fluocinonide (LIDEX) 0.05 % ointment Apply topically 2 times daily Apply topically 2 times daily. Historical Provider, MD   acetaminophen (TYLENOL) 500 MG tablet Take 500 mg by mouth as needed for Pain    Historical Provider, MD   ARIPiprazole (ABILIFY) 5 MG tablet 5 mg daily  9/9/19   Historical Provider, MD [address] MISC Check BS bid and prn 8/21/19   STELLA Cronin CNP   ONE TOUCH ULTRA TEST strip USE ONE STRIP TO TEST TWICE A DAY 2/19/18   Trung Lim MD   aspirin EC 81 MG EC tablet Take 1 tablet by mouth daily 9/8/17   Trung Lim MD   FETZIMA 120 MG CP24 ER capsule Take 120 mg by mouth daily  11/27/15   Historical Provider, MD   traZODone (DESYREL) 100 MG tablet Take 100 mg by mouth nightly.  Indications: 2 tabs nightly    Historical Provider, MD       Current medications:    Current Facility-Administered Medications   Medication Dose Route Frequency Provider Last Rate Last Admin    lidocaine PF 1 % injection 1 mL  1 mL IntraDERmal Once PRN Oh Nelson MD        lactated ringers infusion   IntraVENous Continuous Oh Nelson MD        sodium chloride flush 0.9 % injection 5-40 mL  5-40 mL IntraVENous 2 times per day Oh Nelson MD        sodium chloride flush 0.9 % injection 5-40 mL  5-40 mL IntraVENous PRN Oh Nelson MD        0.9 % sodium chloride infusion   IntraVENous PRN Oh Nelson MD           Allergies:  No Known Allergies    Problem List:    Patient Active Problem List   Diagnosis Code    CKD (chronic kidney disease) stage 3, GFR 30-59 ml/min ([facility]) N18.30    Aortic aneurysm (HCC) I71.9    Nephrolith N20.0    Essential hypertension I10    Hyperlipidemia E78.5    Low back pain potentially associated with radiculopathy M54.50    Secondary diabetes mellitus ([facility] E13.9    Depressive disorder F32.9  History of suicide attempt Z91.51    Major depression in partial remission ([facility] F32.4    Chronic kidney disease, stage III (moderate) ([facility]) N18.30    History of amputation Z89.9    Poor circulation R09.89    Kidney stone N20.0    Secondary diabetes mellitus with stage 3 chronic kidney disease ([facility]) E13.22, N18.30    Stenosis of carotid artery I65.29    Claudication ([facility]) I73.9    Suicidal thoughts R45.851    Lumbar facet arthropathy M47.816    Bilateral carotid artery occlusion I65.23    Bilateral carotid artery stenosis I65.23    Atherosclerosis of native artery of both lower extremities with intermittent claudication ([facility]) I70.213    Preop examination Z01.818    Ataxia R27.0    Cerebral infarction ([facility]) I63.9    Nicotine dependence F17.200       Past Medical History:        Diagnosis Date    AA (aortic aneurysm) ([facility])     MILD SUPRA-RENAL    Anxiety     Atherosclerosis of native artery of both lower extremities with intermittent claudication ([facility] 05/16/2022    Blood clot in vein     Bronchitis     Cerebral infarction ([facility]     several \"mini strokes\" on Feb 14 (about 2017? )- previously saw Dr. June Dye (neurology)    Cervical cancer [facility])     Cervix no chemo or radiation    CKD (chronic kidney disease) stage 3, GFR 30-59 ml/min ([facility])     Dr. Joanne Mondragon- nephrologist    COPD (chronic obstructive pulmonary disease) ([facility]     Depression     Diabetes mellitus ([address]     Dizziness     Eczema     Hx of blood clots     Hyperlipidemia     Hyperlipidemia     Hypertension     Insomnia     Limb ischemia     Loose, teeth     Nephrolith     Patient denies    MAMIE (obstructive sleep apnea)     \"years ago had sleep study, never used machine\"    Peripheral vascular disease ([facility]     Stenosis of carotid artery 07/03/2017    Suicide attempt ([facility]     2012 x2 with pills (per prior record review, was admitted to [facility] 9-1-13 w/suicide attempt)    Wears dentures     Bottom       Past Surgical History:        Procedure Laterality Date    APPENDECTOMY      CATARACT REMOVAL  09/26/2019    CATARACT REMOVAL WITH IMPLANT  09/12/2019    CHOLECYSTECTOMY      HYSTERECTOMY (CERVIX STATUS UNKNOWN)  1995    TOTAL/CERVICAL CANCER    KNEE ARTHROSCOPY Left     OVARY REMOVAL      SKIN BIOPSY      TOE AMPUTATION Left 12/21/2015    5TH PARTIAL-baby toe    VASCULAR SURGERY  09/01/2013    BILATERAL ILIAC ARTERY THROMBECTOMY / AORTOILIAC ANGIOGRAM / ANGIOPLASTY AND STENTING OF ABD AORTA AND KISSING AND THE B/L COMMON ILIAC ARTERY STENTING / ANGIOPLASTY AND STENTING OF THE LEFT COMMON ILIAC ARTERY ([facility]- [facility] JACKSON)    VASCULAR SURGERY  10/16/2013    [id number] ANGIOGRAM / DEPLOYMENT OF ENDURANT [id number] STENT GRAFT / EMBOLIZATION OF LEFT COMMON ILIAC ARTERY W/AMPLATZER PLUG / FEMORAL TO FEMORAL BYPASS GRAFT USING 8 MM RINGED GRAFT / SELECTIVE RIGHT LOWER EXTREMITY ANGIOGRAM / ACCESS OF THE LEFT BRACHIAL ARTERY / STENTING OF RIGHT EXTERNAL ILIAC ARTERY ([facility]- DR. JACKSON)    VASCULAR SURGERY  01/10/2010    B/L ILIAC EMBOLECTOMY / BALLOON ANGIOPLASTY AND STENTING OF B/L COMMON ILIAC ARTERY / PATCH ANGIOPLASTY OF RIGHT COMMON FEMORAL ARTERY / COMPLETION ANGIOGRAM (DR. Yao [facility]791)       Social History:    Social History     Tobacco Use    Smoking status: Current Every Day Smoker     Packs/day: 1.50     Years: 20.00     Pack years: 30.00     Types: Cigarettes     Start date: 1977    Smokeless tobacco: Never Used    Tobacco comment: \"Trying to quit\"- down to less than 3 quarters of a pack as of 6-16-22   Substance Use Topics    Alcohol use: Not Currently     Comment: Quit 8-30-13                                Ready to quit: Not Answered  Counseling given: Not Answered  Comment: \"Trying to quit\"- down to less than 3 quarters of a pack as of 6-16-22      Vital Signs (Current):   Vitals:    07/06/22 0733   BP: (!) 158/70   Pulse: (!) 117   Resp: 20   Temp: 97.1 °F (36.2 °C)   TempSrc: BEART, [id number]     Type & Screen (If Applicable):  No results found for: LABABO, LABRH    Drug/Infectious Status (If Applicable):  Lab Results   Component Value Date/Time    HEPCAB NONREACTIVE 03/11/2022 09:21 AM       COVID-19 Screening (If Applicable): No results found for: COVID19        Anesthesia Evaluation  Patient summary reviewed and Nursing notes reviewed  Airway: Mallampati: III  TM distance: >3 FB   Neck ROM: full  Mouth opening: > = 3 FB   Dental:    (+) poor dentition      Pulmonary:   (+) COPD:  sleep apnea:  decreased breath sounds current smoker                           Cardiovascular:    (+) hypertension:,       ECG reviewed  Rhythm: regular  Rate: normal  Echocardiogram reviewed               ROS comment: Echo; 6/22  Left ventricle is normal in size and wall thickness. Global left ventricular systolic function is normal. Estimated LV EF 60--65  %. No obvious wall motion abnormality seen. Left atrium is normal in size. No significant valvular regurgitation or stenosis seen. Neuro/Psych:   (+) psychiatric history:            GI/Hepatic/Renal:   (+) renal disease: CRI,           Endo/Other:    (+) DiabetesType II DM, , .                 Abdominal:             Vascular:   + PVD, aortic or cerebral, . ROS comment: BILATERAL LOWER EXTREMITY CLAUDICATION. Other Findings:           Anesthesia Plan      general     ASA 3       Induction: intravenous. arterial line  MIPS: Postoperative opioids intended and Prophylactic antiemetics administered. Anesthetic plan and risks discussed with patient. Plan discussed with CRNA.                     Charan Cowart MD   7/6/2022

## 2022-07-06 NOTE — ANESTHESIA POSTPROCEDURE EVALUATION
Department of Anesthesiology  Postprocedure Note    Patient: Monica Mcnair  MRN: [id number]  YOB: 1960  Date of evaluation: 7/6/2022      Procedure Summary     Date: 07/06/22 Room / Location: [address] [place] [address] / [facility]: MANJINDER LEYVA    Anesthesia Start: 6479 Anesthesia Stop: 1400    Procedure: RIGHT GROIN RECONSTRUCTION OF FEMORAL TO FEMORAL BYPASS W/ ENDARTECTOMY FEMORAL TO FEMORAL BYPASS (Right Leg Upper) Diagnosis:       Claudication of lower extremity ([address]      (BILATERAL LOWER EXTREMITY CLAUDICATION)    Surgeons: Erin Carranza MD Responsible Provider: Perico Fortune MD    Anesthesia Type: general ASA Status: 3          Anesthesia Type: No value filed.     Clair Phase I: Clair Score: 9    Clair Phase II:        Anesthesia Post Evaluation    Comments: POST- ANESTHESIA EVALUATION       Pt Name: Monica Mcnair  MRN: [id number]  YOB: 1960  Date of evaluation: 7/6/2022  Time:  3:24 PM      BP (!) 120/56   Pulse (!) 105   Temp 97.7 °F (36.5 °C)   Resp 15   Ht 5' 6\" (1.676 m)   Wt 172 lb (78 kg)   LMP 01/01/1996   SpO2 96%   BMI 27.76 kg/m²      Consciousness Level  Awake  Cardiopulmonary Status  Stable  Pain Adequately Treated YES  Nausea / Vomiting  NO  Adequate Hydration  YES  Anesthesia Related Complications NONE      Electronically signed by Perico Fortune MD on 7/6/2022 at 3:24 PM

## 2022-07-06 NOTE — ANESTHESIA PROCEDURE NOTES
Arterial Line:    An arterial line was placed using surface landmarks, in the OR for the following indication(s): continuous blood pressure monitoring. A 20 gauge (size), 1 and 3/4 inch (length), Arrow (type) catheter was placed, Seldinger technique used, into the right radial artery, secured by tape and Tegaderm. Anesthesia type: Local  Local infiltration: Injection    Events:  patient tolerated procedure well with no complications and EBL < 5mL. 7/6/2022 10:10 AM7/6/2022 10:20 AM  Anesthesiologist: Marco Baltazar MD  Performed: Anesthesiologist   Preanesthetic Checklist  Completed: patient identified, IV checked, site marked, risks and benefits discussed, surgical/procedural consents, equipment checked, pre-op evaluation, timeout performed, anesthesia consent given, oxygen available, monitors applied/VS acknowledged, fire risk safety assessment completed and verbalized and blood product R/B/A discussed and consented

## 2022-07-06 NOTE — INTERVAL H&P NOTE
Update History & Physical    The patient's History and Physical of June 17, 2022 was reviewed with the patient and I examined the patient. There was no change. The surgical site was confirmed by the patient and me. Pt undergoing for RIGHT GROIN RECONSTRUCTION OF FEMORAL TO FEMORAL BYPASS W/ ENDARTECTOMY per Dr. Jeremias Mendoza. Pt denies fever/chills, chest pain , but  she has SOB and coughing this morning  Pt NPO since the past midnight, pt took all her am medication today with sip of water  Pt states the last time she took ASA last night. Pt denies hx of MRSA infection   Pt has hx of blood clots bilateral legs and she has bilateral iliac artery thrombectomy  on 2013  Denies hx of any personal or family hx of complications w/anesthesia. Physical exam remains unchanged  Pt states she had phone call from the hospital told her no cardiac clearance needed for this procedure. See nursing flow sheet for vital sings    Lab Results   Component Value Date    WBC 9.7 06/16/2022    HGB 14.2 06/16/2022    HCT 42.3 06/16/2022    MCV 91.4 06/16/2022     06/16/2022     Lab Results   Component Value Date/Time     06/16/2022 02:30 PM    K 4.4 06/16/2022 02:30 PM     06/16/2022 02:30 PM    CO2 25 06/16/2022 02:30 PM    BUN 19 06/16/2022 02:30 PM    CREATININE 1.04 06/16/2022 02:30 PM    GLUCOSE 207 06/16/2022 02:30 PM    GLUCOSE 131 01/07/2012 08:29 AM    CALCIUM 9.7 06/16/2022 02:30 PM      ECHO 6/18/22  CONCLUSIONS  Summary  Left ventricle is normal in size and wall thickness. Global left ventricular systolic function is normal. Estimated LV EF 60--65 %. No obvious wall motion abnormality seen. Left atrium is normal in size. No significant valvular regurgitation or stenosis seen. Plan: The risks, benefits, expected outcome, and alternative to the recommended procedure have been discussed with the patient. Patient understands and wants to proceed with the procedure.      Electronically signed by Sammie Lenz STELLA Higuera - CNP on 7/6/2022 at 7:09 AM

## 2022-07-06 NOTE — INTERVAL H&P NOTE
Update History & Physical    The patient's History and Physical of June 13,  was reviewed with the patient and I examined the patient. There was no change. The surgical site was confirmed by the patient and me. Plan: The risks, benefits, expected outcome, and alternative to the recommended procedure have been discussed with the patient. Patient understands and wants to proceed with the procedure.      Electronically signed by Petra Samuels MD on 7/6/2022 at 7:47 AM

## 2022-07-06 NOTE — CONSULTS
[address]  Consult Note      Reason for Consult:  ICU admission post-op    Requesting Physician:  Jose Armando Yanez MD    HISTORY OF PRESENT ILLNESS:    The patient is a 57 y.o. female who presents post-right common femoral and SFA endarterectomy and revision of the femorofemoral bypass on the right. The patient has a history of bilateral lower extremity atherosclerosis, CKD, hypertension, DM, and depressive disorder. She was admitted to the ICU for post-operative care. She is hemodynamically stable (Saturating 91% on 3 L via nasal cannula, BP is 120/56, and pulse is 105). She is on a clear liquid diet. Review Of Systems:     Constitutional: fatigue  Eyes: negative  ENT: negative  Denies: sore throat   Respiratory: negative  Cardiac: negative  GI: negative  :negative  Musculoskeletal: negative  Endocrine: negative  Hematology: negative  Dermatology: negative  Psychiatric: did not ask  Neurological: negative    Constitutional: No fever, chills, lethargy, weakness or wt loss  HEENT:  No headache, nasal discharge or sore throat. Cardiac:  No chest pain, dyspnea, orthopnea or PND. Chest:  No cough, phlegm or wheezing. Abdomen:  No abdominal pain, nausea, vomiting or diarrhea. Neuro:  No gross focal weakness, numbness, abnormal movements or seizure like activity. Skin:   No rashes or itching. :   No hematuria, pyuria, dysuria or flank pain. Extremities:  No swelling or joint pains. Endocrine: No polyuria, polydypsia, or thyroid problems. Hematology:    No bleeding disorders, bruising or anemia. All other ROS is negative.        Past Medical History:   Diagnosis Date    AA (aortic aneurysm) ([address]     MILD SUPRA-RENAL    Anxiety     Atherosclerosis of native artery of both lower extremities with intermittent claudication ([address] 05/16/2022    Blood clot in vein     Bronchitis     Cerebral infarction ([address]     several \"mini strokes\" on Feb 14 (about 2017? )- previously saw Dr. Henrry Duron (neurology)    Cervical cancer [facility])     Cervix no chemo or radiation    CKD (chronic kidney disease) stage 3, GFR 30-59 ml/min ([facility])     Dr. Vikash Castellanos- nephrologist    COPD [facility] obstructive pulmonary disease) [facility]     Depression     Diabetes mellitus ([facility]     Dizziness     Eczema     Hx of blood clots     Hyperlipidemia     Hyperlipidemia     Hypertension     Insomnia     Limb ischemia     Loose, teeth     Nephrolith     Patient denies    MAMIE (obstructive sleep apnea)     \"years ago had sleep study, never used machine\"    Peripheral vascular disease ([facility]     Stenosis of carotid artery 07/03/2017    Suicide attempt ([facility]     2012 x2 with pills (per prior record review, was [facility] to [facility] w/suicide attempt)    Wears dentures     Bottom       Past Surgical History:   Procedure Laterality Date    APPENDECTOMY      CATARACT REMOVAL  09/26/2019    CATARACT REMOVAL WITH IMPLANT  09/12/2019    CHOLECYSTECTOMY      FEMORAL ENDARTERECTOMY Right 7/6/2022    RIGHT GROIN RECONSTRUCTION OF FEMORAL TO FEMORAL BYPASS W/ ENDARTECTOMY FEMORAL TO FEMORAL BYPASS performed by Elmer Telles MD at [address] ([facility])  1995    TOTAL/CERVICAL CANCER    KNEE ARTHROSCOPY Left     OVARY REMOVAL      SKIN BIOPSY      TOE AMPUTATION Left 12/21/2015    5TH PARTIAL-baby toe    VASCULAR SURGERY  09/01/2013    BILATERAL ILIAC ARTERY THROMBECTOMY / AORTOILIAC ANGIOGRAM / ANGIOPLASTY AND STENTING OF ABD AORTA AND KISSING AND THE B/L COMMON ILIAC ARTERY STENTING / ANGIOPLASTY AND STENTING OF THE LEFT COMMON ILIAC ARTERY ([facility]- DR. JACKSON)    VASCULAR SURGERY  10/16/2013    LECVE-CT-YXEVU ANGIOGRAM / DEPLOYMENT OF ENDURANT XISSE-XEK-DOBYR STENT GRAFT / EMBOLIZATION OF LEFT COMMON ILIAC ARTERY W/AMPLATZER PLUG / FEMORAL TO FEMORAL BYPASS GRAFT USING 8 MM RINGED GRAFT / SELECTIVE RIGHT LOWER EXTREMITY ANGIOGRAM / ACCESS OF THE LEFT BRACHIAL ARTERY / STENTING OF RIGHT EXTERNAL ILIAC ARTERY ([facility]- DR. JACKSON)    VASCULAR SURGERY  01/10/2010    B/L ILIAC EMBOLECTOMY / BALLOON ANGIOPLASTY AND STENTING OF B/L COMMON ILIAC ARTERY / PATCH ANGIOPLASTY OF RIGHT COMMON FEMORAL ARTERY / COMPLETION ANGIOGRAM (DR. Yao [facility])       Prior to Admission medications    Medication Sig Start Date End Date Taking? Authorizing Provider   Omega-3 Fatty Acids (FISH OIL) 1200 MG CAPS Take by mouth daily    Historical Provider, MD   vitamin D (ERGOCALCIFEROL) 1.25 MG (48610 UT) CAPS capsule Take 1 capsule by mouth once a week 3/22/22   Halina Pruitt PA-C   losartan (COZAAR) 50 MG tablet TAKE ONE TABLET BY MOUTH DAILY  Patient taking differently: at bedtime TAKE ONE TABLET BY MOUTH DAILY 2/28/22   Halina Pruitt PA-C   famotidine (PEPCID) 20 MG tablet TAKE ONE TABLET BY MOUTH ONCE NIGHTLY 2/28/22   Halina Pruitt PA-C   metoprolol succinate (TOPROL XL) 25 MG extended release tablet TAKE ONE TABLET BY MOUTH DAILY  Patient taking differently: at bedtime TAKE ONE TABLET BY MOUTH DAILY 1/18/22   Halina Pruitt PA-C   glimepiride (AMARYL) 4 MG tablet TAKE ONE TABLET BY MOUTH EVERY MORNING AT BREAKFAST AND TAKE ONE TABLET BY MOUTH DAILY WITH DINNER 1/18/22   Halina Pruitt PA-C   Umeclidinium Bromide (INCRUSE ELLIPTA) 62.5 MCG/INH AEPB INHALE ONE PUFF BY MOUTH DAILY 1/18/22   Halina Pruitt PA-C   simvastatin (ZOCOR) 40 MG tablet TAKE ONE TABLET BY MOUTH ONCE NIGHTLY 1/18/22   Halina Pruitt PA-C   gabapentin (NEURONTIN) 100 MG capsule Take 1 capsule by mouth 3 times daily for 180 days.  10/17/21 4/15/22  Neil Bryant MD   simvastatin (ZOCOR) 40 MG tablet TAKE ONE TABLET BY MOUTH ONCE NIGHTLY 4/19/21   STELLA Cruz CNP   glimepiride (AMARYL) 4 MG tablet TAKE ONE TABLET BY MOUTH EVERY MORNING AT BREAKFAST AND TAKE 1 TABLET BY MOUTH DAILY AT Replaced by [facility] 4/19/21   STELLA Cruz CNP   losartan (COZAAR) 50 MG tablet TAKE ONE TABLET BY MOUTH DAILY 3/4/21   STELLA Barnett CNP   fluocinonide (LIDEX) 0.05 % ointment Apply topically 2 times daily Apply topically 2 times daily. Historical Provider, MD   acetaminophen (TYLENOL) 500 MG tablet Take 500 mg by mouth as needed for Pain    Historical Provider, MD   ARIPiprazole (ABILIFY) 5 MG tablet 5 mg daily  9/9/19   Historical Provider, MD Refugia Najjar LANCETS 93Y MISC Check BS bid and prn 8/21/19   STELLA Barnett CNP   ONE TOUCH ULTRA TEST strip USE ONE STRIP TO TEST TWICE A DAY 2/19/18   Evan Chirinos MD   aspirin EC 81 MG EC tablet Take 1 tablet by mouth daily 9/8/17   Evan Chirinos MD   FETZIMA 120 MG CP24 ER capsule Take 120 mg by mouth daily  11/27/15   Historical Provider, MD   traZODone (DESYREL) 100 MG tablet Take 100 mg by mouth nightly.  Indications: 2 tabs nightly    Historical Provider, MD       Scheduled Meds:   aspirin EC  81 mg Oral Daily    famotidine  20 mg Oral BID    glipiZIDE  5 mg Oral BID AC    losartan  50 mg Oral Daily    metoprolol succinate  25 mg Oral Daily    atorvastatin  40 mg Oral Daily    traZODone  100 mg Oral Nightly    tiotropium  2 puff Inhalation Daily    sodium chloride flush  5-40 mL IntraVENous 2 times per day    insulin lispro  0-6 Units SubCUTAneous TID WC    insulin lispro  0-3 Units SubCUTAneous Nightly     Continuous Infusions:   sodium chloride      0.9% NaCl with KCl 20 mEq      niCARdipine      dextrose       PRN Meds:acetaminophen, sodium chloride flush, sodium chloride, ondansetron **OR** ondansetron, oxyCODONE **OR** oxyCODONE, glucose, dextrose bolus **OR** dextrose bolus, glucagon (rDNA), dextrose, hydrALAZINE    No Known Allergies    Social History     Socioeconomic History    Marital status:      Spouse name: Not on file    Number of children: Not on file    Years of education: Not on file    Highest education level: Not on file   Occupational History    Occupation: disability   Tobacco Use  Smoking status: Current Every Day Smoker     Packs/day: 1.50     Years: 20.00     Pack years: 30.00     Types: Cigarettes     Start date: 0    Smokeless tobacco: Never Used    Tobacco comment: \"Trying to quit\"- down to less than 3 quarters of a pack as of 6-16-22   Vaping Use    Vaping Use: Some days    Substances: Never   Substance and Sexual Activity    Alcohol use: Not Currently     Comment: Quit 8-30-13    Drug use: No    Sexual activity: Not Currently   Other Topics Concern    Not on file   Social History Narrative    Not on file     Social Determinants of Health     Financial Resource Strain:     Difficulty of Paying Living Expenses: Not on file   Food Insecurity:     Worried About Running Out of Food in the Last Year: Not on file    [address] of Food in the Last Year: Not on file   Transportation Needs:     Lack of Transportation (Medical): Not on file    Lack of Transportation (Non-Medical):  Not on file   Physical Activity: Inactive    Days of Exercise per Week: 0 days    Minutes of Exercise per Session: 0 min   Stress:     Feeling of Stress : Not on file   Social Connections:     Frequency of Communication with Friends and Family: Not on file    Frequency of Social Gatherings with Friends and Family: Not on file    Attends Jehovah's witness Services: Not on file    Active Member of Clubs or Organizations: Not on file    Attends Club or Organization Meetings: Not on file    Marital Status: Not on file   Intimate Partner Violence:     Fear of Current or Ex-Partner: Not on file    Emotionally Abused: Not on file    Physically Abused: Not on file    Sexually Abused: Not on file   Housing Stability:     Unable to Pay for Housing in the Last Year: Not on file    Number of [place] in the Last Year: Not on file    Unstable Housing in the Last Year: Not on file       Family History   Problem Relation Age of Onset    Thyroid Disease Mother    Michael Izaguirre Other Mother         renal failure,thyroidectomy    Diabetes Mother     Kidney Disease Mother     Heart Disease Father     Diabetes Brother     High Blood Pressure Brother     Coronary Art Dis Brother         Patient denies    Heart Attack Brother          Physical Exam:  Vitals:    07/06/22 1430 07/06/22 1440 07/06/22 1450 07/06/22 1500   BP: 123/66 130/67 108/66 (!) 120/56   Pulse: (!) 110 (!) 106 (!) 106 (!) 105   Resp: 17 13 14 15   Temp:   97.7 °F (36.5 °C) 97.7 °F (36.5 °C)   TempSrc:       SpO2: 91% 92% 93% 91%   Weight:       Height:         No intake/output data recorded. CONSTITUTIONAL:  fatigued  General:  Fatigued, responds to questions when asked. Appears to be stated age. HEENT: Atraumatic, normocephalic. Anicteric sclera. Pink and moist oral mucosa. Neck supple. No carotid bruit. No JVD. Chest: Bilateral air entry, clear to auscultation, no wheezing, rhonchi or rales. Cardiovascular: RRR, S1S2, no murmur, rub or gallop. No lower extremity edema. Abdomen: Soft, non tender to palpation. Active bowel sounds x 4 quadrants. Musculoskeletal: Active ROM x 4 extremities. No cyanosis or clubbing. Integumentary: Pink, warm and dry. Free from rash or lesions. Skin turgor normal.  CNS: Oriented to person, place and time. Cranial nerves grossly intact. Speech clear. Face symmetrical. No tremor.      Data:    CBC:   Lab Results   Component Value Date    WBC 9.7 06/16/2022    HGB 14.2 06/16/2022    HCT 42.3 06/16/2022    MCV 91.4 06/16/2022     06/16/2022     BMP:    Lab Results   Component Value Date     06/16/2022     03/11/2022     09/27/2021    K 4.4 06/16/2022    K 5.0 03/11/2022    K 4.3 09/27/2021     06/16/2022    CL 99 03/11/2022     09/27/2021    CO2 25 06/16/2022    CO2 24 03/11/2022    CO2 23 09/27/2021    BUN 19 06/16/2022    BUN 19 03/11/2022    BUN 18 09/27/2021    CREATININE 1.04 (H) 06/16/2022    CREATININE 0.95 03/11/2022    CREATININE 1.02 (H) 03/11/2022    GLUCOSE 207 (H) 06/16/2022    GLUCOSE 108 (H) 03/11/2022    GLUCOSE 105 (H) 09/27/2021     CMP:   Lab Results   Component Value Date/Time     06/16/2022 02:30 PM    K 4.4 06/16/2022 02:30 PM     06/16/2022 02:30 PM    CO2 25 06/16/2022 02:30 PM    BUN 19 06/16/2022 02:30 PM    CREATININE 1.04 06/16/2022 02:30 PM    GLUCOSE 207 06/16/2022 02:30 PM    GLUCOSE 131 01/07/2012 08:29 AM    CALCIUM 9.7 06/16/2022 02:30 PM    PROT 7.4 06/16/2022 02:30 PM    LABALBU 4.2 06/16/2022 02:30 PM    LABALBU 4.5 01/07/2012 08:29 AM    BILITOT 0.16 06/16/2022 02:30 PM    ALKPHOS 95 06/16/2022 02:30 PM    AST 18 06/16/2022 02:30 PM    ALT 15 06/16/2022 02:30 PM      Hepatic:   Lab Results   Component Value Date    AST 18 06/16/2022    AST 17 03/11/2022    AST 14 09/27/2021    ALT 15 06/16/2022    ALT 13 03/11/2022    ALT 14 09/27/2021    BILITOT 0.16 (L) 06/16/2022    BILITOT 0.18 (L) 03/11/2022    BILITOT 0.15 (L) 09/27/2021    ALKPHOS 95 06/16/2022    ALKPHOS 111 (H) 03/11/2022    ALKPHOS 106 (H) 09/27/2021     BNP: No results found for: BNP  Lipids:   Lab Results   Component Value Date    CHOL 150 09/27/2021    HDL 32 (L) 03/11/2022     INR:   Lab Results   Component Value Date    INR 0.9 06/16/2022    INR 1.0 02/14/2016     PTH: No results found for: PTH  Phosphorus:    Lab Results   Component Value Date/Time    PHOS 3.2 09/27/2021 11:28 AM     Ionized Calcium: No results found for: IONCA  Magnesium:   Lab Results   Component Value Date/Time    MG 2.1 09/27/2021 11:28 AM     Albumin:   Lab Results   Component Value Date/Time    LABALBU 4.2 06/16/2022 02:30 PM    LABALBU 4.5 01/07/2012 08:29 AM     Last 3 CK, CKMB, Troponin: @LABRCNT(CKTOTAL:3,CKMB:3,TROPONINI:3)       URINE:)No results found for: Annie Palyudy    Radiology:  ECHO Complete 2D W Doppler W Color    Result Date: 6/18/2022  [facility] Transthoracic Echocardiography Report (TTE)  Patient Name Kraig Mendenhall     Date of Study             06/18/2022 SARAH WOODY   Date of      1960  Gender                    Female  Birth   Age          57 year(s)  Race                         Room Number              Height:                   66 inch, 167.64 cm   Corporate ID [id number]    Weight:                   172 pounds, 78 kg  #   Patient Acct [de-identified]   BSA:         1.88 m^2     BMI:       27.76 kg/m^2  #   MR #         [id number]      Sonographer               Lyly Dempsey   Accession #  [id number]  Interpreting Physician    Kevin Griffin   Fellow                   Referring Nurse                           Practitioner   Interpreting             Referring Physician       Anant Lopez MD  Fellow  Type of Study   TTE procedure:2D Echocardiogram, M-Mode, Doppler, Color Doppler. Procedure Date Date: 06/18/2022 Start: 08:42 AM Study Location: [address] [place] [address] Technical Quality: Fair visualization Indications:Preop cardiac evaluation. Patient Status: Outpatient Height: 66 inches Weight: 172 pounds BSA: 1.88 m^2 BMI: 27.76 kg/m^2 Rhythm: Within normal limits HR: 92 bpm BP: 138/70 mmHg CONCLUSIONS Summary Left ventricle is normal in size and wall thickness. Global left ventricular systolic function is normal. Estimated LV EF 60--65 %. No obvious wall motion abnormality seen. Left atrium is normal in size. No significant valvular regurgitation or stenosis seen. Signature ----------------------------------------------------------------------------  Electronically signed by Lyly Dempsey(Sonographer) on 06/18/2022 10:21  AM ---------------------------------------------------------------------------- ----------------------------------------------------------------------------  Electronically signed by [facility]) on  06/18/2022 02:04 PM ---------------------------------------------------------------------------- FINDINGS Left Atrium Left atrium is normal in size.  Left Ventricle Left ventricle is normal in size and wall thickness. Global left ventricular systolic function is normal. Estimated LV EF 60--65 %. No obvious wall motion abnormality seen. Right Atrium Right atrium is normal in size. Right Ventricle Normal right ventricular size and function. Mitral Valve No obvious valvular abnormality seen. No evidence of mitral regurgitation. Aortic Valve No obvious valvular abnormality seen. No evidence of aortic insufficiency or stenosis. Tricuspid Valve No obvious valvular abnormality seen. Insignificant tricuspid regurgitation, unable to estimate RVSP. Pulmonic Valve Pulmonic valve was not well visualized. No evidence of pulmonic insufficiency or stenosis. Pericardial Effusion No significant pericardial effusion is seen. Pleural Effusion No pleural effusion seen. Miscellaneous Normal aortic root dimension. E/E' average = 11.3.  M-mode / 2D Measurements & Calculations:   LVIDd:4.04 cm(3.7 - 5.6 cm)      Diastolic GWIRTD:62.0 ml  KFDUY:3.17 cm(2.2 - 4.0 cm)      Systolic ZOVGPP:0.92 ml  SGWE:1.95 cm(0.6 - 1.1 cm)       Aortic Root:2.3 cm(2.0 - 3.7 cm)  LVPWd:0.9 cm(0.6 - 1.1 cm)       LA Dimension: 2.7 cm(1.9 - 4.0 cm)  Fractional Shortenin.73 %    LA volume/Index: 25.8 ml /14m^2  Calculated LVEF (%): 88.76 %     LVOT:2.1 cm   Mitral:                              Aortic   Peak E-Wave: 0.76 m/s                Peak Velocity: 1.13 m/s  Peak A-Wave: 1.11 m/s                Mean Velocity: 0.79 m/s  E/A Ratio: 0.68                      Peak Gradient: 5.11 mmHg  Peak Gradient: 2.29 mmHg             Mean Gradient: 3 mmHg  Deceleration Time: 208 msec                                        Area (continuity): 2.78 cm^2                                       AV VTI: 22.2 cm  Septal Wall E' velocity:0.06 m/s Lateral Wall E' velocity:0.08 m/s      Assessment and Plan    HTN  -Home BP meds  (cozaar and metoprolol) will be resumed.   -Start hydralazine IV 10 mg PRN  -BP: 120/56  -Continue IVF    DM  -Blood glucose: 171  -HgbA1C on 22: 6.6 -Low dose sliding scale   -D/c glipizide  -hypoglycemia protocol    CKD  -BUN on 6/16/2022: 19  -Cr on 6/16/22: 1.04  -Monitor I+O    Bilateral lower extremity atherosclerosis:   -post right common femoral and SFA enterectomy with bovine pericardial patch angioplasty and revision of the femorofemoral bypass on the right by replacement of the proximal end of the bypass.  -continue oxycodone for pain  -Continue Lipitor 40 mg      Depression  -continue trazodone 100 mg nightly       Asthma  -Continue duoneb and spiriva  -O2: 91 on 3 L (nasal cannula)    Thank you for the consultation. Please do not hesitate to call with questions. Luciana Rollins MD, MD JOAQUIN ALBERTS[address] Phone [phone]   Fax: [phone]  Answering Service: [phone] and add on       I have discussed the care of Nina Pate , including pertinent history and exam findings,      7/6/22    with the resident. I have seen and examined the patient and the key elements of all parts of the encounter have been performed by me . I agree with the assessment, plan and orders as documented by the resident. Hospital Problems           Last Modified POA    * (Principal) Atherosclerosis of native artery of both lower extremities with intermittent claudication ([address] 7/6/2022 Yes    Mild basilar atelectasis of both lungs 7/7/2022 Yes    Hypoxia on admission to icu 7/7/2022 Yes             ''''''''''       MD JOAQUIN Hannah [address]    Phone [phone]   Fax: [phone]  Answering Service: [phone]

## 2022-07-06 NOTE — OP NOTE
propatent bypass. This produced excellent flow through the bypass. At the conclusion of the procedure she had a palpable pulse in the left groin which was not present preoperatively. Detailed Description of Procedure: The patient was brought to the operating room and placed in a supine position with her arms tucked at her sides. She was identified as Jo Ann Colby for right groin reconstruction by femoral endarterectomy as well as replacement of the proximal end of the femorofemoral bypass graft. She was given general anesthetic with endotracheal airway establishment. An arterial line was established. Her lower abdomen and groins as well as proximal thighs were prepped and draped in a sterile fashion. A longitudinal incision was employed at the level of the groin from the inguinal ligament down to and including a healthy portion of the SFA. Bovie cautery was used to dissect down to the level of the bypass graft. The bypass graft was dissected in a circumferential fashion giving rise to the SFA which was dissected proximally giving rise to the profunda and common femoral artery. All of these arteries were preserved and dissected circumferentially. All branch points were preserved as well. There were 2 profundofemoral arteries which were encountered both of which had Vesseloops placed around them. The SFA had a vessel loop placed as well as the distal external iliac artery above the level of the femoral circumflex artery. When control was established the patient was given a systemic dose of heparin using 8000 units and later this was augmented with another 2500 units. 3 minutes were allowed to elapse before the femorofemoral bypass graft was clamped as were the SFA, distal external iliac, profundofemoral arteries and all other branch points using either profundofemoral clamps, baby renal clamps or Vesseloops that were already established.   A longitudinal arteriotomy was made after transecting the graft. The graft was then incised with White scissors in a longitudinal fashion down onto the SFA and up onto the common femoral artery. The common femoral artery longitudinal arteriotomy continued up to the inguinal ligament. The SFA was incised approximately 1 cm below the level of the toe of the graft. This measured by the patch angioplasty eventually to be 8 cm in length. Endarterectomy was performed with a dental freer of the common femoral as well as the profundofemoral takeoffs and the proximal SFA. Distal external iliac endarterectomy was performed with a tonsil clamp. Once all debris was removed from the back wall bovine pericardial patch angioplasty was performed using the entire length of the bovine pericardial patch starting at the distal apex at the SFA and closing in a running fashion using 6-0 Prolene on a C1 needle in a four-quadrant running stitch. Flow was initiated first to the profunda and then the SFA and there were excellent signals. The SFA was then clamped proximally with a Earl Hydro  clamp and distally with a profundofemoral clamp. A longitudinal arteriotomy was made in the patch onto which a new piece of PTFE london was placed. This was done with an 8 mm ringed PTFE by spatulating the undrained portion of the graft and then attaching it to the arteriotomy in a four-quadrant running fashion using a 6-0 Prolene on a BV 1 stitch. That graft was then transected and anastomosed in an end-to-end fashion to the original graft using 6-0 Prolene on a BV 1 needle starting in the back wall and working around to the anterior wall and tying the 2 arms of the suture together. That was de-aired through the needle holes before opening to the contralateral lower extremity. There was excellent flow through the graft and there was a palpable pulse in the left groin.   There were needle holes that were bleeding which was controlled with thrombin-soaked Gelfoam as well as a reversal dose

## 2022-07-07 ENCOUNTER — APPOINTMENT (OUTPATIENT)
Dept: GENERAL RADIOLOGY | Age: 62
DRG: 252 | End: 2022-07-07
Attending: SURGERY
Payer: COMMERCIAL

## 2022-07-07 PROBLEM — J98.11 MILD BASILAR ATELECTASIS OF BOTH LUNGS: Status: ACTIVE | Noted: 2022-07-07

## 2022-07-07 PROBLEM — R09.02 HYPOXIA: Status: ACTIVE | Noted: 2022-07-07

## 2022-07-07 LAB
ABSOLUTE EOS #: 0 K/UL (ref 0–0.4)
ABSOLUTE LYMPH #: 1.5 K/UL (ref 1–4.8)
ABSOLUTE MONO #: 1.1 K/UL (ref 0.1–1.3)
ANION GAP SERPL CALCULATED.3IONS-SCNC: 9 MMOL/L (ref 9–17)
BASOPHILS # BLD: 1 % (ref 0–2)
BASOPHILS ABSOLUTE: 0.2 K/UL (ref 0–0.2)
BUN BLDV-MCNC: 18 MG/DL (ref 8–23)
CALCIUM SERPL-MCNC: 8.3 MG/DL (ref 8.6–10.4)
CHLORIDE BLD-SCNC: 105 MMOL/L (ref 98–107)
CO2: 25 MMOL/L (ref 20–31)
CREAT SERPL-MCNC: 1.02 MG/DL (ref 0.5–0.9)
EOSINOPHILS RELATIVE PERCENT: 0 % (ref 0–4)
GFR AFRICAN AMERICAN: >60 ML/MIN
GFR NON-AFRICAN AMERICAN: 55 ML/MIN
GFR SERPL CREATININE-BSD FRML MDRD: ABNORMAL ML/MIN/{1.73_M2}
GLUCOSE BLD-MCNC: 107 MG/DL (ref 65–105)
GLUCOSE BLD-MCNC: 141 MG/DL (ref 65–105)
GLUCOSE BLD-MCNC: 147 MG/DL (ref 65–105)
GLUCOSE BLD-MCNC: 151 MG/DL (ref 65–105)
GLUCOSE BLD-MCNC: 161 MG/DL (ref 70–99)
GLUCOSE BLD-MCNC: 182 MG/DL (ref 65–105)
HCT VFR BLD CALC: 38.4 % (ref 36–46)
HEMOGLOBIN: 12.4 G/DL (ref 12–16)
LYMPHOCYTES # BLD: 11 % (ref 24–44)
MCH RBC QN AUTO: 30.1 PG (ref 26–34)
MCHC RBC AUTO-ENTMCNC: 32.2 G/DL (ref 31–37)
MCV RBC AUTO: 93.5 FL (ref 80–100)
MONOCYTES # BLD: 7 % (ref 1–7)
PDW BLD-RTO: 15 % (ref 11.5–14.9)
PLATELET # BLD: 175 K/UL (ref 150–450)
PMV BLD AUTO: 8.1 FL (ref 6–12)
POTASSIUM SERPL-SCNC: 5.2 MMOL/L (ref 3.7–5.3)
PRO-BNP: 819 PG/ML
PROCALCITONIN: 0.06 NG/ML
RBC # BLD: 4.1 M/UL (ref 4–5.2)
SEG NEUTROPHILS: 81 % (ref 36–66)
SEGMENTED NEUTROPHILS ABSOLUTE COUNT: 11.9 K/UL (ref 1.3–9.1)
SODIUM BLD-SCNC: 139 MMOL/L (ref 135–144)
WBC # BLD: 14.7 K/UL (ref 3.5–11)

## 2022-07-07 PROCEDURE — 6370000000 HC RX 637 (ALT 250 FOR IP): Performed by: SURGERY

## 2022-07-07 PROCEDURE — 94761 N-INVAS EAR/PLS OXIMETRY MLT: CPT

## 2022-07-07 PROCEDURE — 2580000003 HC RX 258: Performed by: SURGERY

## 2022-07-07 PROCEDURE — 85025 COMPLETE CBC W/AUTO DIFF WBC: CPT

## 2022-07-07 PROCEDURE — 2060000000 HC ICU INTERMEDIATE R&B

## 2022-07-07 PROCEDURE — 87070 CULTURE OTHR SPECIMN AEROBIC: CPT

## 2022-07-07 PROCEDURE — 71045 X-RAY EXAM CHEST 1 VIEW: CPT

## 2022-07-07 PROCEDURE — 97535 SELF CARE MNGMENT TRAINING: CPT

## 2022-07-07 PROCEDURE — 6370000000 HC RX 637 (ALT 250 FOR IP): Performed by: STUDENT IN AN ORGANIZED HEALTH CARE EDUCATION/TRAINING PROGRAM

## 2022-07-07 PROCEDURE — 97530 THERAPEUTIC ACTIVITIES: CPT

## 2022-07-07 PROCEDURE — 6370000000 HC RX 637 (ALT 250 FOR IP)

## 2022-07-07 PROCEDURE — 99233 SBSQ HOSP IP/OBS HIGH 50: CPT | Performed by: INTERNAL MEDICINE

## 2022-07-07 PROCEDURE — 97166 OT EVAL MOD COMPLEX 45 MIN: CPT

## 2022-07-07 PROCEDURE — 84145 PROCALCITONIN (PCT): CPT

## 2022-07-07 PROCEDURE — 6360000002 HC RX W HCPCS

## 2022-07-07 PROCEDURE — 87205 SMEAR GRAM STAIN: CPT

## 2022-07-07 PROCEDURE — 6360000002 HC RX W HCPCS: Performed by: SURGERY

## 2022-07-07 PROCEDURE — 36415 COLL VENOUS BLD VENIPUNCTURE: CPT

## 2022-07-07 PROCEDURE — 97161 PT EVAL LOW COMPLEX 20 MIN: CPT

## 2022-07-07 PROCEDURE — 83880 ASSAY OF NATRIURETIC PEPTIDE: CPT

## 2022-07-07 PROCEDURE — 80048 BASIC METABOLIC PNL TOTAL CA: CPT

## 2022-07-07 PROCEDURE — 2700000000 HC OXYGEN THERAPY PER DAY

## 2022-07-07 PROCEDURE — 6360000002 HC RX W HCPCS: Performed by: INTERNAL MEDICINE

## 2022-07-07 PROCEDURE — 94640 AIRWAY INHALATION TREATMENT: CPT

## 2022-07-07 PROCEDURE — 82947 ASSAY GLUCOSE BLOOD QUANT: CPT

## 2022-07-07 RX ORDER — ALBUTEROL SULFATE 2.5 MG/3ML
2.5 SOLUTION RESPIRATORY (INHALATION) 4 TIMES DAILY
Status: DISCONTINUED | OUTPATIENT
Start: 2022-07-07 | End: 2022-07-08 | Stop reason: HOSPADM

## 2022-07-07 RX ORDER — ARIPIPRAZOLE 5 MG/1
5 TABLET ORAL DAILY
Status: DISCONTINUED | OUTPATIENT
Start: 2022-07-07 | End: 2022-07-08 | Stop reason: HOSPADM

## 2022-07-07 RX ORDER — FUROSEMIDE 10 MG/ML
20 INJECTION INTRAMUSCULAR; INTRAVENOUS ONCE
Status: COMPLETED | OUTPATIENT
Start: 2022-07-07 | End: 2022-07-07

## 2022-07-07 RX ADMIN — INSULIN LISPRO 1 UNITS: 100 INJECTION, SOLUTION INTRAVENOUS; SUBCUTANEOUS at 23:52

## 2022-07-07 RX ADMIN — HYDRALAZINE HYDROCHLORIDE 10 MG: 20 INJECTION INTRAMUSCULAR; INTRAVENOUS at 04:17

## 2022-07-07 RX ADMIN — OXYCODONE HYDROCHLORIDE 10 MG: 10 TABLET ORAL at 15:10

## 2022-07-07 RX ADMIN — INSULIN LISPRO 1 UNITS: 100 INJECTION, SOLUTION INTRAVENOUS; SUBCUTANEOUS at 16:55

## 2022-07-07 RX ADMIN — TIOTROPIUM BROMIDE INHALATION SPRAY 2 PUFF: 3.12 SPRAY, METERED RESPIRATORY (INHALATION) at 07:05

## 2022-07-07 RX ADMIN — FUROSEMIDE 20 MG: 10 INJECTION, SOLUTION INTRAMUSCULAR; INTRAVENOUS at 10:31

## 2022-07-07 RX ADMIN — FAMOTIDINE 20 MG: 20 TABLET ORAL at 20:42

## 2022-07-07 RX ADMIN — ALBUTEROL SULFATE 2.5 MG: 2.5 SOLUTION RESPIRATORY (INHALATION) at 19:21

## 2022-07-07 RX ADMIN — OXYCODONE HYDROCHLORIDE 5 MG: 5 TABLET ORAL at 08:34

## 2022-07-07 RX ADMIN — ATORVASTATIN CALCIUM 40 MG: 40 TABLET, FILM COATED ORAL at 08:34

## 2022-07-07 RX ADMIN — OXYCODONE HYDROCHLORIDE 10 MG: 10 TABLET ORAL at 03:03

## 2022-07-07 RX ADMIN — SODIUM CHLORIDE, PRESERVATIVE FREE 10 ML: 5 INJECTION INTRAVENOUS at 20:42

## 2022-07-07 RX ADMIN — SODIUM CHLORIDE, PRESERVATIVE FREE 10 ML: 5 INJECTION INTRAVENOUS at 09:19

## 2022-07-07 RX ADMIN — ALBUTEROL SULFATE 2.5 MG: 2.5 SOLUTION RESPIRATORY (INHALATION) at 14:31

## 2022-07-07 RX ADMIN — ARIPIPRAZOLE 5 MG: 5 TABLET ORAL at 14:03

## 2022-07-07 RX ADMIN — LOSARTAN POTASSIUM 50 MG: 50 TABLET, FILM COATED ORAL at 08:34

## 2022-07-07 RX ADMIN — GLIPIZIDE 5 MG: 5 TABLET ORAL at 16:54

## 2022-07-07 RX ADMIN — OXYCODONE HYDROCHLORIDE 10 MG: 10 TABLET ORAL at 23:53

## 2022-07-07 RX ADMIN — ALBUTEROL SULFATE 2.5 MG: 2.5 SOLUTION RESPIRATORY (INHALATION) at 10:19

## 2022-07-07 RX ADMIN — GLIPIZIDE 5 MG: 5 TABLET ORAL at 08:34

## 2022-07-07 RX ADMIN — POTASSIUM CHLORIDE AND SODIUM CHLORIDE: 900; 150 INJECTION, SOLUTION INTRAVENOUS at 02:21

## 2022-07-07 RX ADMIN — METOPROLOL SUCCINATE 25 MG: 25 TABLET, FILM COATED, EXTENDED RELEASE ORAL at 08:34

## 2022-07-07 RX ADMIN — LEVOMILNACIPRAN HYDROCHLORIDE 120 MG: 120 CAPSULE, EXTENDED RELEASE ORAL at 14:03

## 2022-07-07 RX ADMIN — FAMOTIDINE 20 MG: 20 TABLET ORAL at 08:34

## 2022-07-07 RX ADMIN — TRAZODONE HYDROCHLORIDE 100 MG: 100 TABLET ORAL at 20:42

## 2022-07-07 RX ADMIN — ASPIRIN 81 MG: 81 TABLET, COATED ORAL at 08:34

## 2022-07-07 ASSESSMENT — ENCOUNTER SYMPTOMS
SHORTNESS OF BREATH: 0
SORE THROAT: 0
VOMITING: 0
ABDOMINAL PAIN: 0
CONSTIPATION: 0
WHEEZING: 0
NAUSEA: 0
DIARRHEA: 0
RHINORRHEA: 0
COUGH: 0
COLOR CHANGE: 1
ABDOMINAL DISTENTION: 0

## 2022-07-07 ASSESSMENT — PAIN DESCRIPTION - ONSET
ONSET: ON-GOING
ONSET: ON-GOING

## 2022-07-07 ASSESSMENT — PAIN DESCRIPTION - DESCRIPTORS
DESCRIPTORS: SHARP;SHOOTING
DESCRIPTORS: SHOOTING

## 2022-07-07 ASSESSMENT — PAIN DESCRIPTION - ORIENTATION
ORIENTATION: RIGHT
ORIENTATION: RIGHT

## 2022-07-07 ASSESSMENT — PAIN SCALES - GENERAL
PAINLEVEL_OUTOF10: 4
PAINLEVEL_OUTOF10: 8
PAINLEVEL_OUTOF10: 5
PAINLEVEL_OUTOF10: 8
PAINLEVEL_OUTOF10: 6

## 2022-07-07 ASSESSMENT — PAIN DESCRIPTION - PAIN TYPE
TYPE: SURGICAL PAIN
TYPE: SURGICAL PAIN

## 2022-07-07 ASSESSMENT — PAIN DESCRIPTION - FREQUENCY
FREQUENCY: CONTINUOUS
FREQUENCY: CONTINUOUS

## 2022-07-07 ASSESSMENT — PAIN DESCRIPTION - LOCATION
LOCATION: GROIN
LOCATION: GROIN

## 2022-07-07 NOTE — PROGRESS NOTES
JAMES Chavez on unit. Notified of patient having urinary retention. Orders received to straight cath patient.

## 2022-07-07 NOTE — PROGRESS NOTES
Patient has been attempting to void with no success. Stomach is slightly distended. RN bladder scanned patient, which showed 830 mL of urine. RN to message NP regarding findings.

## 2022-07-07 NOTE — PROGRESS NOTES
pain, constipation, diarrhea, nausea and vomiting. Endocrine: Negative for cold intolerance and heat intolerance. Musculoskeletal: Positive for joint swelling. Skin: Positive for color change. Negative for pallor and rash. Neurological: Positive for numbness. Negative for dizziness, tremors, syncope, light-headedness and headaches. Psychiatric/Behavioral: Negative for agitation and confusion. Medications:      Allergies:  No Known Allergies    Current Meds:   Scheduled Meds:    albuterol  2.5 mg Nebulization 4x daily    levomilnacipran  120 mg Oral Daily    ARIPiprazole  5 mg Oral Daily    aspirin EC  81 mg Oral Daily    famotidine  20 mg Oral BID    glipiZIDE  5 mg Oral BID AC    losartan  50 mg Oral Daily    atorvastatin  40 mg Oral Daily    traZODone  100 mg Oral Nightly    tiotropium  2 puff Inhalation Daily    sodium chloride flush  5-40 mL IntraVENous 2 times per day    insulin lispro  0-6 Units SubCUTAneous TID WC    insulin lispro  0-3 Units SubCUTAneous Nightly    metoprolol succinate  25 mg Oral Daily     Continuous Infusions:    sodium chloride      dextrose       PRN Meds: acetaminophen, sodium chloride flush, sodium chloride, ondansetron **OR** ondansetron, oxyCODONE **OR** oxyCODONE, glucose, dextrose bolus **OR** dextrose bolus, glucagon (rDNA), dextrose, hydrALAZINE    Data:     Past Medical History:   has a past medical history of AA (aortic aneurysm) ([facility] Anxiety, Atherosclerosis of native artery of both lower extremities with intermittent claudication ([facility] [address] Blood clot in vein, Bronchitis, Cerebral infarction ([facility] [address] Cervical cancer ([facility] [address] CKD (chronic kidney disease) stage 3, GFR 30-59 ml/min ([facility]), COPD (chronic obstructive pulmonary disease) ([facility] [address] Depression, Diabetes mellitus ([facility] [address] [facility] Dizziness, Eczema, Hx of blood clots, Hyperlipidemia, Hyperlipidemia, Hypertension, Insomnia, Limb ischemia, Loose, teeth, Nephrolith, MAMIE (obstructive sleep apnea), Peripheral vascular disease ([address] Stenosis of carotid artery, Suicide attempt [facility]), and Wears dentures. Social History:   reports that she has been smoking cigarettes. She started smoking about 45 years ago. She has a 30.00 pack-year smoking history. She has never used smokeless tobacco. She reports previous alcohol use. She reports that she does not use drugs. Family History:   Family History   Problem Relation Age of Onset    Thyroid Disease Mother     Other Mother         renal failure,thyroidectomy    Diabetes Mother     Kidney Disease Mother     Heart Disease Father     Diabetes Brother     High Blood Pressure Brother     Coronary Art Dis Brother         Patient denies    Heart Attack Brother        Vitals:  /63   Pulse (!) 108   Temp 99.2 °F (37.3 °C)   Resp 16   Ht 5' 6\" (1.676 m)   Wt 172 lb (78 kg)   LMP 1996   SpO2 95%   BMI 27.76 kg/m²   Temp (24hrs), Av.5 °F (36.9 °C), Min:98 °F (36.7 °C), Max:99.2 °F (37.3 °C)    Recent Labs     22  1725 22  0724 22  1120   POCGLU 184* 153* 107* 141*       I/O(24Hr):     Intake/Output Summary (Last 24 hours) at 2022 1558  Last data filed at 2022 0919  Gross per 24 hour   Intake 1401.88 ml   Output 900 ml   Net 501.88 ml       Labs:  [unfilled]    Lab Results   Component Value Date/Time    SPECIAL NOT REPORTED 2020 10:16 PM     Lab Results   Component Value Date/Time    CULTURE PENDING 2022 11:20 AM       [unfilled]    Radiology:    ECHO Complete 2D W Doppler W Color    Result Date: 2022  [address] Transthoracic Echocardiography Report (TTE)  Patient Name Charu Brizuela     Date of Study             2022               SARAH WOODY   Date of      1960  Gender                    Female  Birth   Age          57 year(s)  Race                         Room Number              Height:                   66 inch, 167.64 cm   Corporate ID [id number]    Weight: 172 pounds, 78 kg  #   Patient Acct [de-identified]   BSA:         1.88 m^2     BMI:       27.76 kg/m^2  #   MR #         [id number]      Sonographer               Lyly Dempsey   Accession #  [id number]  Interpreting Physician    Kevin Griffin   Fellow                   Referring Nurse                           Practitioner   Interpreting             Referring Physician       Yoselyn Cancino MD  Fellow  Type of Study   TTE procedure:2D Echocardiogram, M-Mode, Doppler, Color Doppler. Procedure Date Date: 06/18/2022 Start: 08:42 AM Study Location: [address] [place] [address] Technical Quality: Fair visualization Indications:Preop cardiac evaluation. Patient Status: Outpatient Height: 66 inches Weight: 172 pounds BSA: 1.88 m^2 BMI: 27.76 kg/m^2 Rhythm: Within normal limits HR: 92 bpm BP: 138/70 mmHg CONCLUSIONS Summary Left ventricle is normal in size and wall thickness. Global left ventricular systolic function is normal. Estimated LV EF 60--65 %. No obvious wall motion abnormality seen. Left atrium is normal in size. No significant valvular regurgitation or stenosis seen. Signature ----------------------------------------------------------------------------  Electronically signed by Lyly Dempsey(Sonographer) on 06/18/2022 10:21  AM ---------------------------------------------------------------------------- ----------------------------------------------------------------------------  Electronically signed by Kevin Griffin(Interpreting physician) on  06/18/2022 02:04 PM ---------------------------------------------------------------------------- FINDINGS Left Atrium Left atrium is normal in size. Left Ventricle Left ventricle is normal in size and wall thickness. Global left ventricular systolic function is normal. Estimated LV EF 60--65 %. No obvious wall motion abnormality seen. Right Atrium Right atrium is normal in size. Right Ventricle Normal right ventricular size and function.  Mitral Valve No obvious valvular abnormality seen. No evidence of mitral regurgitation. Aortic Valve No obvious valvular abnormality seen. No evidence of aortic insufficiency or stenosis. Tricuspid Valve No obvious valvular abnormality seen. Insignificant tricuspid regurgitation, unable to estimate RVSP. Pulmonic Valve Pulmonic valve was not well visualized. No evidence of pulmonic insufficiency or stenosis. Pericardial Effusion No significant pericardial effusion is seen. Pleural Effusion No pleural effusion seen. Miscellaneous Normal aortic root dimension. E/E' average = 11.3.  M-mode / 2D Measurements & Calculations:   LVIDd:4.04 cm(3.7 - 5.6 cm)      Diastolic QHWSYL:62.9 ml  OAUOR:3.39 cm(2.2 - 4.0 cm)      Systolic LWHLJ.10 ml  RXFC:5.76 cm(0.6 - 1.1 cm)       Aortic Root:2.3 cm(2.0 - 3.7 cm)  LVPWd:0.9 cm(0.6 - 1.1 cm)       LA Dimension: 2.7 cm(1.9 - 4.0 cm)  Fractional Shortenin.73 %    LA volume/Index: 25.8 ml /14m^2  Calculated LVEF (%): 88.76 %     LVOT:2.1 cm   Mitral:                              Aortic   Peak E-Wave: 0.76 m/s                Peak Velocity: 1.13 m/s  Peak A-Wave: 1.11 m/s                Mean Velocity: 0.79 m/s  E/A Ratio: 0.68                      Peak Gradient: 5.11 mmHg  Peak Gradient: 2.29 mmHg             Mean Gradient: 3 mmHg  Deceleration Time: 208 msec                                        Area (continuity): 2.78 cm^2                                       AV VTI: 22.2 cm  Septal Wall E' velocity:0.06 m/s Lateral Wall E' velocity:0.08 m/s    XR CHEST PORTABLE    Result Date: 2022  EXAMINATION: ONE XRAY VIEW OF THE CHEST 2022 5:56 am COMPARISON: 2016 HISTORY: ORDERING SYSTEM PROVIDED HISTORY: desaturation, post op TECHNOLOGIST PROVIDED HISTORY: desaturation, post op Reason for Exam: desaturation, post op Additional signs and symptoms: desaturation, post op Relevant Medical/Surgical History: desaturation, post op FINDINGS: AP portable view of the chest time stamped at 523 hours demonstrates overlying cardiac monitoring electrodes. Heart size is normal.  No focal consolidation, effusion, or pneumothorax is noted. Osseous and mediastinal structures are age-appropriate. Haziness is present at the left base likely due to overlying soft tissues. Vascularity is minimally prominent. Mild prominence of the vascularity. Physical Examination:        Physical Exam  Constitutional:       General: She is not in acute distress. Appearance: Normal appearance. HENT:      Head: Normocephalic and atraumatic. Nose: No congestion or rhinorrhea. Eyes:      Extraocular Movements: Extraocular movements intact. Conjunctiva/sclera: Conjunctivae normal.      Pupils: Pupils are equal, round, and reactive to light. Cardiovascular:      Rate and Rhythm: Normal rate and regular rhythm. Pulses: Normal pulses. Heart sounds: Normal heart sounds. No murmur heard. No friction rub. No gallop. Pulmonary:      Effort: Pulmonary effort is normal. No respiratory distress. Breath sounds: Normal breath sounds. No wheezing or rhonchi. Abdominal:      General: Abdomen is flat. Bowel sounds are normal. There is no distension. Tenderness: There is no abdominal tenderness. There is no guarding. Musculoskeletal:      Right lower leg: No edema. Left lower leg: No edema. Skin:     Capillary Refill: Capillary refill takes less than 2 seconds. Comments: Right knee discoloration, pain, and tenderness   Neurological:      General: No focal deficit present. Mental Status: She is oriented to person, place, and time. Sensory: No sensory deficit. Motor: No weakness.    Psychiatric:         Mood and Affect: Mood normal.           Assessment:        Primary Problem  Atherosclerosis of native artery of both lower extremities with intermittent claudication [facility])    Active Hospital Problems    Diagnosis Date Noted    Mild basilar atelectasis of both lungs

## 2022-07-07 NOTE — PROGRESS NOTES
Physical Therapy  Facility/Department: [address] [place] [address]  Physical Therapy Initial Assessment    Name: Carmen Hernandez  : 1960  MRN: [id number]  Date of Service: 2022    Discharge Recommendations:  Continue to assess pending progress,Patient would benefit from continued therapy after discharge   PT Equipment Recommendations  Equipment Needed: No      Patient Diagnosis(es): The encounter diagnosis was Claudication of lower extremity ([address] Past Medical History:  has a past medical history of AA (aortic aneurysm) ([address] Anxiety, Atherosclerosis of native artery of both lower extremities with intermittent claudication ([address] Blood clot in vein, Bronchitis, Cerebral infarction ([address] Cervical cancer ([address] CKD (chronic kidney disease) stage 3, GFR 30-59 ml/min ([facility]), COPD (chronic obstructive pulmonary disease) ([address] Depression, Diabetes mellitus ([address] Dizziness, Eczema, Hx of blood clots, Hyperlipidemia, Hyperlipidemia, Hypertension, Insomnia, Limb ischemia, Loose, teeth, Nephrolith, MAMIE (obstructive sleep apnea), Peripheral vascular disease ([address] Stenosis of carotid artery, Suicide attempt ([address] and Wears dentures. Past Surgical History:  has a past surgical history that includes Hysterectomy (); Cholecystectomy; vascular surgery (2013); Toe amputation (Left, 2015); Knee arthroscopy (Left); Cataract removal (2019); Cataract removal with implant (2019); Ovary removal; Appendectomy; skin biopsy; vascular surgery (10/16/2013); vascular surgery (01/10/2010); and Femoral Endarterectomy (Right, 2022). Assessment   Body Structures, Functions, Activity Limitations Requiring Skilled Therapeutic Intervention: Decreased endurance; Increased pain  Assessment: Pt has an incerased amount of pain post SFA endarectomy but functionally safe to return home with assist. The pt is ModA for bed mobility due to increase groin pain with movement of legs, CGA for Sit<>stand, gait of 120ft CGA no AD, 2 steps no AD hand rail on L ascending CGA. Pt denies dizziness and did not experiene nay LOB with mobility. Treatment Diagnosis: decresed endurance and increasd groin pain due to Right common femoral and SFA endarterectomy  Therapy Prognosis: Good  Decision Making: Low Complexity  History: low back pain with radiculopathy, CKD(stage 3), cerbral infarction, aortic aneurysm  Exam: ROM.  strength, functional mobility  Barriers to Learning: none  Requires PT Follow-Up: Yes  Activity Tolerance  Activity Tolerance: Patient tolerated evaluation without incident;Patient limited by endurance     Plan   Plan  Plan: 3-5 times per week  Specific Instructions for Next Treatment: progress distance walked and navigating more stairs (as she has 3 steps to get into trailer home)  Current Treatment Recommendations: Gait training,Stair training,Therapeutic activities,Functional mobility training  Safety Devices  Type of Devices: Left in chair,Nurse notified,Gait belt,Call light within reach (RN present in room upon OT/PT exit)  Restraints  Restraints Initially in Place: No     Restrictions  Restrictions/Precautions  Restrictions/Precautions: Fall Risk,General Precautions,Surgical protocol  Required Braces or Orthoses?: No  Implants present? : Metal implants (stents)  Position Activity Restriction  Other position/activity restrictions: ambulate patient     Subjective   Pain: Pt reports a 10/10 in groin area and pain on medial R knee  General  Patient assessed for rehabilitation services?: Yes  Response To Previous Treatment: Not applicable  Family / Caregiver Present: No  Referring Practitioner: Dr. Jesús Minor  Referral Date : 07/06/22  Follows Commands: Within Functional Limits  General Comment  Comments: okayed to proceed with PT eval per nurse Bryan Gallegos  Subjective  Subjective: Pt agreed to work with PT/Ot on this date         Social/Functional History  Social/Functional History  Lives With: Significant other  Type of Home: Trailer  Home Layout: One level  Home Access: Stairs to enter with rails  Entrance Stairs - Number of Steps: 3  Entrance Stairs - Rails: Left  Bathroom Shower/Tub: Tub/Shower unit,Curtain  Bathroom Toilet: Standard  Bathroom Accessibility: Accessible  Home Equipment:  (pt reports not having any AD at home)  Has the patient had two or more falls in the past year or any fall with injury in the past year?: No  ADL Assistance: [address]: Independent  Homemaking Responsibilities: Yes  Ambulation Assistance: Independent  Transfer Assistance: Independent  Active : No (hasnt driven since 3703)  Patient's  Info: Leyla Friedman (significant other)  Mode of Transportation: Truck  Occupation: On disability  Leisure & Hobbies: tv ion ([employer])  IADL Comments: pt reports sleeping in a flat bed at home  [address]: Impaired  Vision Exceptions: Wears glasses for reading  Hearing  Hearing: Within functional limits    Cognition   Orientation  Overall Orientation Status: Within Functional Limits  Orientation Level: Oriented X4  Cognition  Overall Cognitive Status: WFL     Objective     O2 Device: Nasal cannula  Comment: SpO2:90 and HR 89 sitting on toilet.      Observation/Palpation  Posture: Good  Observation: peripheral IV on L hand and forearm and incision anterior femur  Gross Assessment  Sensation: Impaired (pt reports numbness and tingling on R LE below knee and numbness/tingling on L dosrum of hand)     AROM RLE (degrees)  RLE AROM: WFL  AROM LLE (degrees)  LLE AROM : WFL  AROM RUE (degrees)  RUE General AROM: see OT UE assessment  AROM LUE (degrees)  LUE General AROM: see OT UE assessment  Strength RLE  Strength RLE: WFL  Strength LLE  Strength LLE: WFL  Strength RUE  Comment: see OT UE assessment  Strength LUE  Comment: see OT UE assessment           Bed mobility  Rolling to Left: Stand by assistance  Rolling to Right: Stand by assistance  Supine to Sit: Moderate assistance (for trunk progression)  Sit to Supine:  (pt retired in bedside chair)  Scooting: Contact guard assistance  Bed Mobility Comments: HOB flat; pt did not utilize bed rails to simulate home environment. Pt reports pain with groin movement. Transfers  Sit to Stand: Contact guard assistance  Stand to sit: Contact guard assistance  Comment: No AD used. Pt denied dizziness. Toilet transfer: CGAx1  Ambulation  Surface: level tile  Device: No Device  Assistance: Contact guard assistance  Gait Deviations: Slow [place]  Distance: 120ft  Stairs/Curb  Stairs?: Yes  Stairs  # Steps : 2  Stairs Height: 4\"  Rails: Left ascending  Device: No Device  Assistance: Contact guard assistance     Balance  Posture: Good  Sitting - Static: Good;+  Sitting - Dynamic: Good;-  Standing - Static: Good;+  Standing - Dynamic: Good;+  Comments: pt reports lighted headedness when sitting on toilet             AM-PAC Score  AM-PAC Inpatient Mobility Raw Score : 18 (07/07/22 0925)  AM-PAC Inpatient T-Scale Score : 43.63 (07/07/22 0925)  Mobility Inpatient CMS 0-100% Score: 46.58 (07/07/22 0925)  Mobility Inpatient CMS G-Code Modifier : CK (07/07/22 0925)          Goals  Short Term Goals  Time Frame for Short term goals: 3-5 days  Short term goal 1: the pt will tolerate 30 min of therapeutic exercise to improve activity tolerance  Short term goal 2: pt will perform bed mob with SBA and no bed rails used to be able to get in and out of bed with her SO. Short term goal 3: Pt will be SBA/supervison for sit<>stand with no AD to be able to safely transfer at home. Short term goal 4: Pt will be SBA for ambulation of >200ft with no AD to progress pt to commmunity ambulation. Short term goal 5: Pt will be able to navigate 3 steps with hand rail with SBA to be able to sfely enter [facility] home.   Patient Goals   Patient goals : to go home       Education  Patient Education  Education Given To: Patient  Education Provided: Plan of Care;Transfer Training  Education Provided Comments: education provided on ambulating stairs with stronger leg first when ascending, good follow through on pt behalf  Education Method: Demonstration;Verbal  Barriers to Learning: None  Education Outcome: Verbalized understanding;Demonstrated understanding      Therapy Time   Individual Concurrent Group Co-treatment   Time In 0925         Time Out 0958         Minutes 33         Timed Code Treatment Minutes: [address]      PT evaluation/treatment is completed by TAINA Trent under the direct supervision of co-signing therapist, who agrees with all documentation and evaluation/treatment.

## 2022-07-07 NOTE — FLOWSHEET NOTE
07/06/22 1906   Treatment Team Notification   Reason for Communication Evaluate   Team Member Name Emeli Chavez   Treatment Team Role Advanced Practice Nurse   Method of Communication Secure Message   Response See orders   NP Emeli Chavez notified that patient was admitted to the ICU after surgery but there is no critical care doctor on. See orders.

## 2022-07-07 NOTE — PROGRESS NOTES
Attestation and add on       I have discussed the care of Suzi Diaz , including pertinent history and exam findings,      7/7/22    with the resident. I have seen and examined the patient and the key elements of all parts of the encounter have been performed by me . I agree with the assessment, plan and orders as documented by the resident. Hospital Problems           Last Modified POA    * (Principal) Atherosclerosis of native artery of both lower extremities with intermittent claudication ([address] 7/6/2022 Yes    Mild basilar atelectasis of both lungs 7/7/2022 Yes    Hypoxia on admission to icu 7/7/2022 Yes              --post op respiratory failure   Improving   Resident note to follow . pulm input  --   Acute postoperative hypoxic respiratory failure, initially requiring 5 L of oxygen now down to 3 to 4 L  Left lower lobe haziness/atelectasis  Status post right groin reconstruction of femoral-femoral bypass with SFA endarterectomy 7/6  History of COPD, no pulmonary function testing  History of tobacco use  Possible MAMIE   ; Medications:      Allergies:  No Known Allergies    Current Meds:   Scheduled Meds:    albuterol  2.5 mg Nebulization 4x daily    levomilnacipran  120 mg Oral Daily    ARIPiprazole  5 mg Oral Daily    aspirin EC  81 mg Oral Daily    famotidine  20 mg Oral BID    glipiZIDE  5 mg Oral BID AC    losartan  50 mg Oral Daily    atorvastatin  40 mg Oral Daily    traZODone  100 mg Oral Nightly    tiotropium  2 puff Inhalation Daily    sodium chloride flush  5-40 mL IntraVENous 2 times per day    insulin lispro  0-6 Units SubCUTAneous TID WC    insulin lispro  0-3 Units SubCUTAneous Nightly    metoprolol succinate  25 mg Oral Daily     Continuous Infusions:    sodium chloride      dextrose       PRN Meds: acetaminophen, sodium chloride flush, sodium chloride, ondansetron **OR** ondansetron, oxyCODONE **OR** oxyCODONE, glucose, dextrose bolus **OR** dextrose bolus, glucagon (rDNA), dextrose, hydrALAZINE      MD JOAQUIN Farias [address], [address] [place] [address].    Phone [phone]   Fax: [phone]  Answering Service: [phone]

## 2022-07-07 NOTE — PROGRESS NOTES
Occupational Therapy  Facility/Department: [facility] ICU  Occupational Therapy Initial Assessment    Name: Donte Thrasher  : 1960  MRN: [id number]  Date of Service: 2022      Discharge Recommendations:  Patient would benefit from continued therapy after discharge  OT Equipment Recommendations  Equipment Needed: No       Patient Diagnosis(es): The encounter diagnosis was Claudication of lower extremity ([address] Past Medical History:  has a past medical history of AA (aortic aneurysm) ([address] Anxiety, Atherosclerosis of native artery of both lower extremities with intermittent claudication ([address] Blood clot in vein, Bronchitis, Cerebral infarction ([address] Cervical cancer ([address] CKD (chronic kidney disease) stage 3, GFR 30-59 ml/min ([facility]), COPD (chronic obstructive pulmonary disease) ([address] Depression, Diabetes mellitus ([address] Dizziness, Eczema, Hx of blood clots, Hyperlipidemia, Hyperlipidemia, Hypertension, Insomnia, Limb ischemia, Loose, teeth, Nephrolith, MAMIE (obstructive sleep apnea), Peripheral vascular disease ([address] Stenosis of carotid artery, Suicide attempt ([address] and Wears dentures. Past Surgical History:  has a past surgical history that includes Hysterectomy (); Cholecystectomy; vascular surgery (2013); Toe amputation (Left, 2015); Knee arthroscopy (Left); Cataract removal (2019); Cataract removal with implant (2019); Ovary removal; Appendectomy; skin biopsy; vascular surgery (10/16/2013); vascular surgery (01/10/2010); and Femoral Endarterectomy (Right, 2022). Treatment Diagnosis: impaired self care status      Assessment   Performance deficits / Impairments: Decreased functional mobility ; Decreased ADL status; Decreased endurance;Decreased balance;Decreased high-level IADLs  Treatment Diagnosis: impaired self care status  Prognosis: Good  Decision Making: Medium Complexity  REQUIRES OT FOLLOW-UP: Yes  Activity Tolerance  Activity Tolerance: Patient limited by fatigue;Patient Tolerated treatment well        Plan   Plan  Times per Week: 2-3  Current Treatment Recommendations: Balance training,Functional mobility training,Endurance training,Pain management,Safety education & training,Patient/Caregiver education & training,Self-Care / ADL,Home management training,Sensory integraion     Restrictions  Restrictions/Precautions  Required Braces or Orthoses?: No  Implants present? : Metal implants (stents)  Position Activity Restriction  Other position/activity restrictions: ambulate patient    Subjective   General  Patient assessed for rehabilitation services?: Yes  General Comment  Comments: WIL Clayton ok'd pt for OT/PT eval this date. Pt agreeable and pleasant/cooperative throughout.   Pt reports a 10/10 in groin area and pain on medial R knee     Social/Functional History  Social/Functional History  Lives With: Significant other  Type of Home: Trailer  Home Layout: One level  Home Access: Stairs to enter with rails  Entrance Stairs - Number of Steps: 3  Entrance Stairs - Rails: Left  Bathroom Shower/Tub: Tub/Shower unit,Curtain  Bathroom Toilet: Standard  Bathroom Accessibility: Accessible  Home Equipment:  (pt reports not having any AD at home)  Has the patient had two or more falls in the past year or any fall with injury in the past year?: No  ADL Assistance: [address]: Independent  Homemaking Responsibilities: Yes  Ambulation Assistance: Independent  Transfer Assistance: Independent  Active : No (hasnt driven since 3183)  Patient's  Info: Portia Medina (significant other)  Mode of Transportation: Truck  Occupation: On disability  Leisure & Hobbies: tv ion (movies)  IADL Comments: pt reports sleeping in a flat bed at home  Additional Comments: pt reports lighted headedness when sitting toilet       Objective   Heart Rate: (!) 108  Heart Rate Source: Monitor  BP: (!) 122/51  BP Location: Left upper arm  BP Method: Automatic  Patient Position: Semi emir  MAP (Calculated): 74.67  Resp: 14  SpO2: 97 %  O2 Device: Nasal cannula  Comment: SpO2:90 and HR 89 sitting on toilet. Safety Devices  Type of Devices: Left in chair;Nurse notified;Gait belt;Call light within reach (RN present in room upon OT/PT exit)  Restraints  Restraints Initially in Place: No     Toilet Transfers  Toilet - Technique: Ambulating  Equipment Used: Standard toilet  Toilet Transfer: Contact guard assistance  Toilet Transfers Comments: no utilization of GB; slow descent onto toilet     ADL  Feeding: Modified independent   Grooming: Modified independent   UE Bathing: Stand by assistance  LE Bathing: Minimal assistance  UE Dressing: Stand by assistance  UE Dressing Skilled Clinical Factors: pt threaded BUEs into hospital gown  LE Dressing: Contact guard assistance  LE Dressing Skilled Clinical Factors: pt able to doff/don brief standing at toilet; CGA for dynamic standing balance  Toileting: Contact guard assistance  Toileting Skilled Clinical Factors: seated on toilet to complete cody hygiene  Additional Comments: All ADLs based on skilled observation and clinical reasoning unless otherwise noted. Pt is currently limited by fatigue, impacting performance in ADLs.   Tone RUE  RUE Tone: Normotonic  Tone LUE  LUE Tone: Normotonic  Coordination  Movements Are Fluid And Coordinated: Yes     Bed mobility  Rolling to Left: Stand by assistance  Rolling to Right: Stand by assistance  Supine to Sit: Moderate assistance (for trunk progression)  Sit to Supine:  (pt retired in bedside chair)  Scooting: Contact guard assistance  Bed Mobility Comments: HOB flat; pt did not utilize bed rails to simulate home environment  Transfers  Sit to stand: Contact guard assistance  Stand to sit: Contact guard assistance  Transfer Comments: no AD     Cognition  Overall Cognitive Status: WFL  Orientation  Overall Orientation Status: Within Functional Limits  Orientation Level: Oriented X4 Education Given To: Patient  Education Provided: Role of Therapy;Plan of Care;Energy Conservation;Transfer Training  Education Provided Comments: pursed lip breathing techniques  Education Method: Demonstration;Verbal  Barriers to Learning: None  Education Outcome: Demonstrated understanding  LUE AROM (degrees)  LUE AROM : WFL  Left Hand AROM (degrees)  Left Hand AROM: WFL  RUE AROM (degrees)  RUE AROM : WFL  Right Hand AROM (degrees)  Right Hand AROM: WFL  LUE Strength  Gross LUE Strength: WFL  L Hand General: 4+/5  LUE Strength Comment: grossly 4+/5  RUE Strength  Gross RUE Strength: WFL  R Hand General: 4+/5  RUE Strength Comment: grossly 4+/5                AM-PAC Score        AM-PAC Inpatient Daily Activity Raw Score: 20 (07/07/22 1136)  AM-PAC Inpatient ADL T-Scale Score : 42.03 (07/07/22 1136)  ADL Inpatient CMS 0-100% Score: 38.32 (07/07/22 1136)  ADL Inpatient CMS G-Code Modifier : CJ (07/07/22 1136)    Goals  Short Term Goals  Time Frame for Short term goals: By discharge,  Short Term Goal 1: Patient will demonstrate functional transfers/mobility during self care tasks with SUP  Short Term Goal 2: Patient will demonstrate lower body bathing/dressing with SUP  Short Term Goal 3: Patient will demonstrate upper body bathing/dressing with Mod I  Short Term Goal 4: Patient will demonstrate dynamic standing and reaching outside KIESHA during self care for 8+ minutes with SUP and maintaining O2 >90%  Short Term Goal 5: Patient will actively participate in 15+ minutes of therapeutic activity/exercise to increase activity tolerance and endurance for participation in ADLs       Therapy Time   Individual Concurrent Group Co-treatment   Time In [facility]         Time Out 0958         Minutes 33         Timed Code Treatment Minutes: [address] [facility],

## 2022-07-07 NOTE — PROGRESS NOTES
Dr. Laith Garrison notified of patient c/o numbness to left leg from thigh to ankle. He states this is normal finding, no intervention needed. Also ok for DVT prophylaxis. Problem: Goal Outcome Summary  Goal: Goal Outcome Summary  Outcome: No Change  VSS. A&O x4. Up with 1, GB and walker. NPO for CT.  Denies pain. Edema BLE. Will continue to monitor.

## 2022-07-07 NOTE — FLOWSHEET NOTE
07/06/22 2018   Treatment Team Notification   Reason for Communication Evaluate   Team Member Name Dr. Arben Ramirez Provider   Method of Communication Call   Response See orders   Dr. Stefanie Hall updated on new consult due to ICU admission. Updated on current condition. MD updated that patient is currently on 5L NC but does not usually wear any oxygen at home. Notified that patient was having episodes of desaturation when sleeping. Orders received for portable chest x-ray in the morning.

## 2022-07-07 NOTE — PROGRESS NOTES
Dr. Faith Emerson at bedside. MD stated ok for patient to start ambulating today and to advance to general diet. MD stated to update him later on how patient does.

## 2022-07-07 NOTE — CONSULTS
[facility]   CONSULT NOTE:      DATE OF CONSULT 7/7/2022    REASON FOR CONSULTATION:  Pulmonary/critical care management      PCP Shea Bolivar PA-C     CHIEF COMPLAINT: Cough, dyspnea with exertion    HISTORY OF PRESENT ILLNESS:     Larry Millan is a 59-year-old female who underwent vascular surgery with Dr. Ashleigh Kaiser yesterday afternoon. She underwent a right common femoral and SFA endarterectomy with a bovine pericardial patch, as well as a revision of the femoral femoral bypass on the right. Surgery was uncomplicated in terms of blood loss and the patient was extubated in the PACU. However, post procedure she required oxygen up to 5 L nasal cannula (not on oxygen at home). Additionally, overnight, it was noted that the patient was desaturating when she was sleeping. She states that over the last year she has had increasing dyspnea on exertion. She also has had a cough which is productive of yellowish phlegm. No documented fevers or chills. She denies any chest pain. She carries a diagnosis of COPD but we do not have any pulmonary function testing. There is no diagnosis of obstructive sleep apnea. She is a smoker, up to 1.5-2 packs a day for about 40 years. She says lately she is cut down to \"half a pack a day\". She takes Incruse at home but does not use albuterol. She said she was diagnosed with sleep apnea \"years ago\" but the only sleep study was found was from 2012 where her AHI was 0. She did have a prolonged latency to sleep. However now she complains of daytime sleepiness, daytime fatigue, and heavy snoring.       ALLERGIES:  No Known Allergies    HOME MEDICATIONS:  Medications Prior to Admission: Omega-3 Fatty Acids (FISH OIL) 1200 MG CAPS, Take by mouth daily  vitamin D (ERGOCALCIFEROL) 1.25 MG (41569 UT) CAPS capsule, Take 1 capsule by mouth once a week  losartan (COZAAR) 50 MG tablet, TAKE ONE TABLET BY MOUTH DAILY (Patient taking differently: at bedtime TAKE ONE TABLET BY MOUTH DAILY)  famotidine (PEPCID) 20 MG tablet, TAKE ONE TABLET BY MOUTH ONCE NIGHTLY  metoprolol succinate (TOPROL XL) 25 MG extended release tablet, TAKE ONE TABLET BY MOUTH DAILY (Patient taking differently: at bedtime TAKE ONE TABLET BY MOUTH DAILY)  glimepiride (AMARYL) 4 MG tablet, TAKE ONE TABLET BY MOUTH EVERY MORNING AT BREAKFAST AND TAKE ONE TABLET BY MOUTH DAILY WITH DINNER  Umeclidinium Bromide (INCRUSE ELLIPTA) 62.5 MCG/INH AEPB, INHALE ONE PUFF BY MOUTH DAILY  simvastatin (ZOCOR) 40 MG tablet, TAKE ONE TABLET BY MOUTH ONCE NIGHTLY  gabapentin (NEURONTIN) 100 MG capsule, Take 1 capsule by mouth 3 times daily for 180 days. fluocinonide (LIDEX) 0.05 % ointment, Apply topically 2 times daily Apply topically 2 times daily. acetaminophen (TYLENOL) 500 MG tablet, Take 500 mg by mouth as needed for Pain  ARIPiprazole (ABILIFY) 5 MG tablet, 5 mg daily   ONETOUCH DELICA LANCETS 01N MISC, Check BS bid and prn  ONE TOUCH ULTRA TEST strip, USE ONE STRIP TO TEST TWICE A DAY  aspirin EC 81 MG EC tablet, Take 1 tablet by mouth daily  FETZIMA 120 MG CP24 ER capsule, Take 120 mg by mouth daily   traZODone (DESYREL) 100 MG tablet, Take 100 mg by mouth nightly. Indications: 2 tabs nightly      PAST MEDICAL HISTORY:  Past Medical History:   Diagnosis Date    AA (aortic aneurysm) ([facility]     MILD SUPRA-RENAL    Anxiety     Atherosclerosis of native artery of both lower extremities with intermittent claudication ([facility] 05/16/2022    Blood clot in vein     Bronchitis     Cerebral infarction ([facility]     several \"mini strokes\" on Feb 14 (about 2017? )- previously saw Dr. Alejandra Morrow (neurology)    Cervical cancer [facility])     Cervix no chemo or radiation    CKD (chronic kidney disease) stage 3, GFR 30-59 ml/min ([facility]     Dr. Chavo Sexton- nephrologist    COPD (chronic obstructive pulmonary disease) ([facility]     Depression     Diabetes mellitus ([facility]     Dizziness     Eczema     Hx of blood clots     Hyperlipidemia     Hyperlipidemia     Hypertension     Insomnia     Limb ischemia     Loose, teeth     Nephrolith     Patient denies    MAMIE (obstructive sleep apnea)     \"years ago had sleep study, never used machine\"    Peripheral vascular disease ([facility]     Stenosis of carotid artery 07/03/2017    Suicide attempt ([facility]     2012 x2 with pills (per prior record review, was admitted to [facility] 9-1-13 w/suicide attempt)    Wears dentures     Bottom       PAST SURGICAL HISTORY:  Past Surgical History:   Procedure Laterality Date    APPENDECTOMY      CATARACT REMOVAL  09/26/2019    CATARACT REMOVAL WITH IMPLANT  09/12/2019    CHOLECYSTECTOMY      FEMORAL ENDARTERECTOMY Right 7/6/2022    RIGHT GROIN RECONSTRUCTION OF FEMORAL TO FEMORAL BYPASS W/ ENDARTECTOMY FEMORAL TO FEMORAL BYPASS performed by Ramya Ayala MD at [address] ([address] [place] [address])  1995    TOTAL/CERVICAL CANCER    KNEE ARTHROSCOPY Left     OVARY REMOVAL      SKIN BIOPSY      TOE AMPUTATION Left 12/21/2015    5TH PARTIAL-baby toe    VASCULAR SURGERY  09/01/2013    BILATERAL ILIAC ARTERY THROMBECTOMY / AORTOILIAC ANGIOGRAM / ANGIOPLASTY AND STENTING OF ABD AORTA AND KISSING AND THE B/L COMMON ILIAC ARTERY STENTING / ANGIOPLASTY AND STENTING OF THE LEFT COMMON ILIAC ARTERY ([facility]- DR. JACKSON)    VASCULAR SURGERY  10/16/2013    [id number] ANGIOGRAM / DEPLOYMENT OF ENDURANT [id number] STENT GRAFT / EMBOLIZATION OF LEFT COMMON ILIAC ARTERY W/AMPLATZER PLUG / FEMORAL TO FEMORAL BYPASS GRAFT USING 8 MM RINGED GRAFT / SELECTIVE RIGHT LOWER EXTREMITY ANGIOGRAM / ACCESS OF THE LEFT BRACHIAL ARTERY / STENTING OF RIGHT EXTERNAL ILIAC ARTERY ([facility]- DR. JACKSON)    VASCULAR SURGERY  01/10/2010    B/L ILIAC EMBOLECTOMY / BALLOON ANGIOPLASTY AND STENTING OF B/L COMMON ILIAC ARTERY / PATCH ANGIOPLASTY OF RIGHT COMMON FEMORAL ARTERY / COMPLETION ANGIOGRAM (DR. Yao [facility])          SOCIAL HISTORY:  Social History     Socioeconomic History    Marital status:      Spouse name: Not on file    Number of children: Not on file    Years of education: Not on file    Highest education level: Not on file   Occupational History    Occupation: disability   Tobacco Use    Smoking status: Current Every Day Smoker     Packs/day: 1.50     Years: 20.00     Pack years: 30.00     Types: Cigarettes     Start date: 0    Smokeless tobacco: Never Used    Tobacco comment: \"Trying to quit\"- down to less than 3 quarters of a pack as of 6-16-22   Vaping Use    Vaping Use: Some days    Substances: Never   Substance and Sexual Activity    Alcohol use: Not Currently     Comment: Quit 8-30-13    Drug use: No    Sexual activity: Not Currently   Other Topics Concern    Not on file   Social History Narrative    Not on file     Social Determinants of Health     Financial Resource Strain:     Difficulty of Paying Living Expenses: Not on file   Food Insecurity:     Worried About Running Out of Food in the Last Year: Not on file    Radha of Food in the Last Year: Not on file   Transportation Needs:     Lack of Transportation (Medical): Not on file    Lack of Transportation (Non-Medical):  Not on file   Physical Activity: Inactive    Days of Exercise per Week: 0 days    Minutes of Exercise per Session: 0 min   Stress:     Feeling of Stress : Not on file   Social Connections:     Frequency of Communication with Friends and Family: Not on file    Frequency of Social Gatherings with Friends and Family: Not on file    Attends Temple Services: Not on file    Active Member of Clubs or Organizations: Not on file    Attends Club or Organization Meetings: Not on file    Marital Status: Not on file   Intimate Partner Violence:     Fear of Current or Ex-Partner: Not on file    Emotionally Abused: Not on file    Physically Abused: Not on file    Sexually Abused: Not on file   Housing Stability:     Unable to Pay for 04:12 AM    RBC 4.53 01/07/2012 08:29 AM    HGB 12.4 07/07/2022 04:12 AM    HCT 38.4 07/07/2022 04:12 AM     07/07/2022 04:12 AM     01/07/2012 08:29 AM    MCV 93.5 07/07/2022 04:12 AM    MCH 30.1 07/07/2022 04:12 AM    MCHC 32.2 07/07/2022 04:12 AM    RDW 15.0 07/07/2022 04:12 AM    LYMPHOPCT 11 07/07/2022 04:12 AM    MONOPCT 7 07/07/2022 04:12 AM    BASOPCT 1 07/07/2022 04:12 AM    MONOSABS 1.10 07/07/2022 04:12 AM    LYMPHSABS 1.50 07/07/2022 04:12 AM    EOSABS 0.00 07/07/2022 04:12 AM    BASOSABS 0.20 07/07/2022 04:12 AM    DIFFTYPE NOT REPORTED 09/27/2021 11:28 AM       BMP   Lab Results   Component Value Date/Time     07/07/2022 04:12 AM    K 5.2 07/07/2022 04:12 AM     07/07/2022 04:12 AM    CO2 25 07/07/2022 04:12 AM    BUN 18 07/07/2022 04:12 AM    CREATININE 1.02 07/07/2022 04:12 AM    GLUCOSE 161 07/07/2022 04:12 AM    GLUCOSE 131 01/07/2012 08:29 AM    CALCIUM 8.3 07/07/2022 04:12 AM       LFTS  Lab Results   Component Value Date/Time    ALKPHOS 95 06/16/2022 02:30 PM    ALT 15 06/16/2022 02:30 PM    AST 18 06/16/2022 02:30 PM    PROT 7.4 06/16/2022 02:30 PM    BILITOT 0.16 06/16/2022 02:30 PM    BILIDIR 0.08 09/27/2021 11:29 AM    IBILI 0.07 09/27/2021 11:29 AM    LABALBU 4.2 06/16/2022 02:30 PM    LABALBU 4.5 01/07/2012 08:29 AM       INR  No results for input(s): PROTIME, INR in the last 72 hours. APTT  No results for input(s): APTT in the last 72 hours. Lactic Acid  No results found for: LACTA     PRO-BNP   No results for input(s): PROBNP in the last 72 hours.         ABGs: No results found for: PHART, PO2ART, UCJ8NOW    No results found for: IFIO2, MODE, SETTIDVOL, SETPEEP      Impression    Acute postoperative hypoxic respiratory failure, initially requiring 5 L of oxygen now down to 3 to 4 L  Left lower lobe haziness/atelectasis  Status post right groin reconstruction of femoral-femoral bypass with SFA endarterectomy 7/6  History of COPD, no pulmonary function testing  History of tobacco use  Possible MAMIE    Plan:      Hemodynamically stable  Okay to transfer to stepdown  Wean oxygen off keep saturations greater than 88%  Add incentive spirometry  Add albuterol for bronchodilation, continue Incruse (Spiriva)  No need for steroids  Check sputum culture  Check proBNP and procalcitonin level  DVT and GI prophylaxis/early mobilization  Oxygen evaluation prior to discharge  Outpatient pulmonary function testing  Outpatient sleep study  Smoking cessation discussed with her, she states that she will stop while in the hospital.  I asked if she needed nicotine patch and she declined.

## 2022-07-07 NOTE — PLAN OF CARE
Problem: Discharge Planning  Goal: Discharge to home or other facility with appropriate resources  Outcome: Progressing  Flowsheets  Taken 7/7/2022 0000 by Ailin Wright RN  Discharge to home or other facility with appropriate resources: Identify barriers to discharge with patient and caregiver  Taken 7/6/2022 2000 by Ailin Wright RN  Discharge to home or other facility with appropriate resources: Identify barriers to discharge with patient and caregiver     Problem: Pain  Goal: Verbalizes/displays adequate comfort level or baseline comfort level  Outcome: Progressing  Flowsheets  Taken 7/7/2022 0000  Verbalizes/displays adequate comfort level or baseline comfort level: Assess pain using appropriate pain scale  Taken 7/6/2022 2000  Verbalizes/displays adequate comfort level or baseline comfort level: Assess pain using appropriate pain scale  Note: Pain assessed at regular intervals. Problem: Skin/Tissue Integrity  Goal: Absence of new skin breakdown  Description: 1. Monitor for areas of redness and/or skin breakdown  2. Assess vascular access sites hourly  3. Every 4-6 hours minimum:  Change oxygen saturation probe site  4. Every 4-6 hours:  If on nasal continuous positive airway pressure, respiratory therapy assess nares and determine need for appliance change or resting period. Outcome: Progressing  Note: Patient turned and repositioned every two hours and as needed. No signs of skin breakdown noted. Problem: Safety - Adult  Goal: Free from fall injury  Outcome: Progressing  Note: Patient remains free from falls. Patient on bedrest until today per surgery.       Problem: ABCDS Injury Assessment  Goal: Absence of physical injury  Outcome: Progressing

## 2022-07-07 NOTE — CARE COORDINATION
CASE MANAGEMENT NOTE:    Admission Date:  7/6/2022 Ariela Ramírez is a 57 y.o.  female    Admitted for : Claudication of lower extremity ([facility] [I73.9]  Atherosclerosis of native artery of both lower extremities with intermittent claudication ([facility] [I70.213]    Met with:  Patient    PCP:  Alda Olivo                                Insurance:  Pedro Ceballos      Is patient alert and oriented at time of discussion:  Yes    Current Residence/ Living Arrangements:  independently at home with significant other, David           Current Services PTA:  No    Does patient go to outpatient dialysis: No  If yes, location and chair time: n/a    Is patient agreeable to VNS: No    Freedom of choice provided:  Yes    List of [address] provided: No    VNS chosen:  NA    DME:  none    Home Oxygen: No    Nebulizer: No    CPAP/BIPAP: No    Supplier: N/A    Potential Assistance Needed: May need home O2    SNF needed: No    Freedom of choice and list provided: NA    Pharmacy:  [facility]       Is patient currently receiving oral anticoagulation therapy? No    Is the Patient an [facility] with Readmission Risk Score greater than 14%? No  If yes, pt needs a follow up appointment made within 7 days. Family Members/Caregivers that pt would like involved in their care:    Yes    If yes, list name here:  Dhruv Ham and [address]    Transportation Provider:  Family             Discharge Plan:  Home without needs.                  Electronically signed by: Eunice Batista RN on 7/7/2022 at 1:48 PM

## 2022-07-07 NOTE — PROGRESS NOTES
Progress Note  Date:2022       Room:  Patient Name:Annalisa Sams     YOB: 1960     Age:62 y.o. Subjective    Subjective patient has no significant complaints. She complains of groin soreness which is certainly expected. She has no foot pain on either side. She has not yet ambulated in the halls. She is producing urine and is tolerating a diet. IV fluids have stopped. She is using 5 L of oxygen via nasal cannula. At home she does not use oxygen. She is a heavy smoker and has cut back to approximately half a pack per day with plans to quit over this admission. Review of Systems  Objective         Vitals Last 24 Hours:  TEMPERATURE:  Temp  Av.9 °F (36.6 °C)  Min: 97.1 °F (36.2 °C)  Max: 98.9 °F (37.2 °C)  RESPIRATIONS RANGE: Resp  Av.7  Min: 11  Max: 23  PULSE OXIMETRY RANGE: SpO2  Av.8 %  Min: 91 %  Max: 99 %  PULSE RANGE: Pulse  Av.4  Min: 79  Max: 117  BLOOD PRESSURE RANGE: Systolic (44LEN), HDP:546 , Min:108 , YOZ:598   ; Diastolic (57URX), OFD:29, Min:53, Max:71    I/O (24Hr): Intake/Output Summary (Last 24 hours) at 2022 0651  Last data filed at 2022 0500  Gross per 24 hour   Intake 2679.82 ml   Output 950 ml   Net 1729.82 ml     Objective on examination her feet are warm and well-perfused. It is difficult to palpate pulses in the left common femoral as the bypass is below the level of the common femoral artery. She does have a heart rate of 112 but I do believe that there is a palpable pulse in the graft at its [place] on the left side. The foot on the left is warm and there is no pain. She has no dependent rubor. Her white count is expectedly elevated at 14.7. Her hematocrit is stable at 38.4 with a platelet count of 683. Creatinine is stable at 1.02.   Labs/Imaging/Diagnostics    Labs:  CBC:  Recent Labs     22  0412   WBC 14.7*   RBC 4.10   HGB 12.4   HCT 38.4   MCV 93.5   RDW 15.0*        CHEMISTRIES:  Recent Labs     07/07/22  0412      K 5.2      CO2 25   BUN 18   CREATININE 1.02*   GLUCOSE 161*     PT/INR:No results for input(s): PROTIME, INR in the last 72 hours. APTT:No results for input(s): APTT in the last 72 hours. LIVER PROFILE:No results for input(s): AST, ALT, BILIDIR, BILITOT, ALKPHOS in the last 72 hours. Imaging Last 24 Hours:  No results found. Assessment//Plan           Hospital Problems           Last Modified POA    * (Principal) Atherosclerosis of native artery of both lower extremities with intermittent claudication ([facility] [address] 7/6/2022 Yes        Assessment & Plan  At this point I think she is doing well regarding the reconstruction in her right groin. I expected that she would have oxygenation problems after anesthesia for several hours. I think the best option will be for her to get out of bed and ambulate to improve her lung function and if she does well she could go home as early as today. If not then she can go home tomorrow or over the weekend as we wean the oxygen and increase her activity. She understands and agrees.   Electronically signed by Amira Araiza MD on 7/7/22 at 6:51 AM EDT

## 2022-07-08 VITALS
TEMPERATURE: 98.5 F | HEIGHT: 66 IN | BODY MASS INDEX: 27.64 KG/M2 | DIASTOLIC BLOOD PRESSURE: 66 MMHG | SYSTOLIC BLOOD PRESSURE: 139 MMHG | OXYGEN SATURATION: 95 % | RESPIRATION RATE: 20 BRPM | WEIGHT: 171.96 LBS | HEART RATE: 109 BPM

## 2022-07-08 LAB
CULTURE: NORMAL
DIRECT EXAM: NORMAL
GLUCOSE BLD-MCNC: 148 MG/DL (ref 65–105)
SPECIMEN DESCRIPTION: NORMAL
SURGICAL PATHOLOGY REPORT: NORMAL

## 2022-07-08 PROCEDURE — 6370000000 HC RX 637 (ALT 250 FOR IP)

## 2022-07-08 PROCEDURE — 82947 ASSAY GLUCOSE BLOOD QUANT: CPT

## 2022-07-08 PROCEDURE — 6370000000 HC RX 637 (ALT 250 FOR IP): Performed by: STUDENT IN AN ORGANIZED HEALTH CARE EDUCATION/TRAINING PROGRAM

## 2022-07-08 PROCEDURE — 6370000000 HC RX 637 (ALT 250 FOR IP): Performed by: SURGERY

## 2022-07-08 PROCEDURE — 99233 SBSQ HOSP IP/OBS HIGH 50: CPT | Performed by: INTERNAL MEDICINE

## 2022-07-08 RX ORDER — OXYCODONE HYDROCHLORIDE 5 MG/1
5 TABLET ORAL EVERY 6 HOURS PRN
Qty: 28 TABLET | Refills: 0 | Status: SHIPPED | OUTPATIENT
Start: 2022-07-08 | End: 2022-07-15

## 2022-07-08 RX ORDER — OXYCODONE HYDROCHLORIDE 5 MG/1
5 TABLET ORAL EVERY 6 HOURS PRN
Qty: 28 TABLET | Refills: 0 | Status: SHIPPED | OUTPATIENT
Start: 2022-07-08 | End: 2022-07-08

## 2022-07-08 RX ADMIN — ARIPIPRAZOLE 5 MG: 5 TABLET ORAL at 08:43

## 2022-07-08 RX ADMIN — INSULIN LISPRO 1 UNITS: 100 INJECTION, SOLUTION INTRAVENOUS; SUBCUTANEOUS at 08:43

## 2022-07-08 RX ADMIN — FAMOTIDINE 20 MG: 20 TABLET ORAL at 08:43

## 2022-07-08 RX ADMIN — METOPROLOL SUCCINATE 25 MG: 25 TABLET, FILM COATED, EXTENDED RELEASE ORAL at 08:42

## 2022-07-08 RX ADMIN — GLIPIZIDE 5 MG: 5 TABLET ORAL at 06:42

## 2022-07-08 RX ADMIN — ATORVASTATIN CALCIUM 40 MG: 40 TABLET, FILM COATED ORAL at 08:43

## 2022-07-08 RX ADMIN — LOSARTAN POTASSIUM 50 MG: 50 TABLET, FILM COATED ORAL at 08:43

## 2022-07-08 RX ADMIN — LEVOMILNACIPRAN HYDROCHLORIDE 120 MG: 120 CAPSULE, EXTENDED RELEASE ORAL at 08:43

## 2022-07-08 RX ADMIN — ASPIRIN 81 MG: 81 TABLET, COATED ORAL at 08:43

## 2022-07-08 ASSESSMENT — ENCOUNTER SYMPTOMS
SHORTNESS OF BREATH: 0
VOMITING: 0
DIARRHEA: 0
TROUBLE SWALLOWING: 0
NAUSEA: 0
COLOR CHANGE: 0
ABDOMINAL PAIN: 0

## 2022-07-08 NOTE — PLAN OF CARE
Problem: Discharge Planning  Goal: Discharge to home or other facility with appropriate resources  7/8/2022 0540 by Keith Quigley RN  Outcome: Progressing     Problem: Pain  Goal: Verbalizes/displays adequate comfort level or baseline comfort level  7/8/2022 0540 by Keith Quigley RN  Outcome: Progressing  Note: Prn pain medication given once this shift      Problem: Skin/Tissue Integrity  Goal: Absence of new skin breakdown  Description: 1. Monitor for areas of redness and/or skin breakdown  2. Assess vascular access sites hourly  3. Every 4-6 hours minimum:  Change oxygen saturation probe site  4. Every 4-6 hours:  If on nasal continuous positive airway pressure, respiratory therapy assess nares and determine need for appliance change or resting period.   7/8/2022 0540 by Keith Quigley RN  Outcome: Progressing     Problem: Safety - Adult  Goal: Free from fall injury  7/8/2022 0540 by Keith Quigley RN  Outcome: Progressing     Problem: ABCDS Injury Assessment  Goal: Absence of physical injury  7/8/2022 0540 by Keith Quigley RN  Outcome: Progressing  Note: Bed alarm on, patient assisted when ambulating      Problem: Chronic Conditions and Co-morbidities  Goal: Patient's chronic conditions and co-morbidity symptoms are monitored and maintained or improved  7/8/2022 0540 by Kieth Quigley RN  Outcome: Progressing  Note: Continuing to monitor

## 2022-07-08 NOTE — PROGRESS NOTES
Pulmonary Progress Note  [facility]      Patient - Keegan Jimenez,  Age - 57 y.o.    - 1960      Room Number - -01   [facility] -  [id number]   [facility] # - [de-identified]  Date of Admission -  2022  7:02 AM        Consulting Service/Physician   Consulting - Petra Samuels MD  Primary Care Physician - Emy Corral PA-C     SUBJECTIVE   Ready to be discharged, she has her things packed up. She is on room air and denies any dyspnea    OBJECTIVE   VITALS    height is 5' 6\" (1.676 m) and weight is 171 lb 15.3 oz (78 kg). Her oral temperature is 98.5 °F (36.9 °C). Her blood pressure is 139/66 and her pulse is 109 (abnormal). Her respiration is 20 and oxygen saturation is 95%. Body mass index is 27.75 kg/m². Temperature Range: Temp: 98.5 °F (36.9 °C) Temp  Av.7 °F (37.1 °C)  Min: 98.5 °F (36.9 °C)  Max: 99.2 °F (37.3 °C)  BP Range:  Systolic (25XSQ), PHM:763 , Min:121 , MQI:417     Diastolic (96LHC), IYH:71, Min:51, Max:66    Pulse Range: Pulse  Av.9  Min: 100  Max: 111  Respiration Range: Resp  Av.2  Min: 14  Max: 33  Current Pulse Ox[de-identified]  SpO2: 95 %  24HR Pulse Ox Range:  SpO2  Av.5 %  Min: 92 %  Max: 97 %  Oxygen Amount and Delivery: O2 Flow Rate (L/min): 0 L/min    Wt Readings from Last 3 Encounters:   22 171 lb 15.3 oz (78 kg)   22 172 lb (78 kg)   22 172 lb (78 kg)       I/O (24 Hours)    Intake/Output Summary (Last 24 hours) at 2022 0938  Last data filed at 2022 0559  Gross per 24 hour   Intake --   Output 1100 ml   Net -1100 ml       EXAM     General Appearance  Awake, alert, oriented, in no acute distress  HEENT - normocephalic, atraumatic.  []  Mallampati  [] Crowded airway   [x] Macroglossia  []  Retrognathia  [x] Micrognathia  []  Normal tongue size []  Normal Bite  [] Gooding sign positive    Neck - Supple,  trachea midline   Lungs -clear  Cardiovascular - Heart sounds are normal. Regular rate and rhythm   Abdomen - Soft, nontender, nondistended, no masses or organomegaly  Neurologic - There are no focal motor or sensory deficits  Skin - No bruising or bleeding  Extremities - No clubbing, cyanosis, edema    MEDS      albuterol  2.5 mg Nebulization 4x daily    levomilnacipran  120 mg Oral Daily    ARIPiprazole  5 mg Oral Daily    aspirin EC  81 mg Oral Daily    famotidine  20 mg Oral BID    glipiZIDE  5 mg Oral BID AC    losartan  50 mg Oral Daily    atorvastatin  40 mg Oral Daily    traZODone  100 mg Oral Nightly    tiotropium  2 puff Inhalation Daily    sodium chloride flush  5-40 mL IntraVENous 2 times per day    insulin lispro  0-6 Units SubCUTAneous TID WC    insulin lispro  0-3 Units SubCUTAneous Nightly    metoprolol succinate  25 mg Oral Daily      sodium chloride      dextrose       acetaminophen, sodium chloride flush, sodium chloride, ondansetron **OR** ondansetron, oxyCODONE **OR** oxyCODONE, glucose, dextrose bolus **OR** dextrose bolus, glucagon (rDNA), dextrose, hydrALAZINE    LABS   CBC   Recent Labs     07/07/22  0412   WBC 14.7*   HGB 12.4   HCT 38.4   MCV 93.5        BMP:   Lab Results   Component Value Date/Time     07/07/2022 04:12 AM    K 5.2 07/07/2022 04:12 AM     07/07/2022 04:12 AM    CO2 25 07/07/2022 04:12 AM    BUN 18 07/07/2022 04:12 AM    LABALBU 4.2 06/16/2022 02:30 PM    LABALBU 4.5 01/07/2012 08:29 AM    CREATININE 1.02 07/07/2022 04:12 AM    CALCIUM 8.3 07/07/2022 04:12 AM    GFRAA >60 07/07/2022 04:12 AM    LABGLOM 55 07/07/2022 04:12 AM     ABGs:No results found for: PHART, PO2ART, QPQ6HTE No results found for: IFIO2, MODE, SETTIDVOL, SETPEEP  Ionized Calcium:  No results found for: IONCA  Magnesium:    Lab Results   Component Value Date/Time    MG 2.1 09/27/2021 11:28 AM     Phosphorus:    Lab Results   Component Value Date/Time    PHOS 3.2 09/27/2021 11:28 AM        LIVER PROFILE No results for input(s): AST, ALT, LIPASE, BILIDIR, BILITOT, ALKPHOS in the last 72 hours. Invalid input(s): AMYLASE,  ALB  INR No results for input(s): INR in the last 72 hours.   PTT   Lab Results   Component Value Date    APTT 25.7 06/16/2022         RADIOLOGY     (See actual reports for details)    ASSESSMENT/PLAN   Acute postoperative hypoxic respiratory failure, initially requiring 5 L of oxygen now down on room air  Left lower lobe haziness/atelectasis  Status post right groin reconstruction of femoral-femoral bypass with SFA endarterectomy 7/6  History of COPD, no pulmonary function testing  History of tobacco use  Possible MAMIE    I suspect her hypoxia was related to atelectasis and slight volume overload  Continue mobilization and incentive spirometry  Outpatient pulmonary function testing  Procalcitonin was not elevated so unlikely that patient has pneumonia  We will repeat x-ray in about 4 weeks  Outpatient pulmonary function testing  Outpatient repeat sleep study, she has more signs and symptoms now, she had a negative sleep study in 2012  Smoking cessation once again discussed  Okay to discharge from pulmonary standpoint, follow-up in office in 4 to 6 weeks  Electronically signed by Beth Ayala MD on 7/8/2022 at 9:40 AM

## 2022-07-08 NOTE — CARE COORDINATION
ONGOING DISCHARGE PLAN:    Patient is alert and oriented x4. Spoke with patient regarding discharge plan and patient confirms that plan is still to go home with no needs. POD#2 right fem pop bypass. Will continue to follow for additional discharge needs.     Electronically signed by Miya Ingram RN on 7/8/2022 at 10:00 AM

## 2022-07-08 NOTE — DISCHARGE SUMMARY
Discharge Summary    Date: 7/8/2022  Patient Name: Edna Marin YOB: 1960 Age: 57 y.o. Admit Date: 7/6/2022  Discharge Date: 7/8/2022  Discharge Condition: Good    Admission Diagnosis  Claudication of lower extremity (HCC) (I73.9); [facility]ros[facility] native artery of both lower extremities [facility] (E65.623)     Discharge Diagnosis  Principal Problem: Atherosclerosis of native artery of both lower extremities with intermittent claudication (HCC)Active Problems: Mild basilar atelectasis of both lungs Hypoxia on admission to icuResolved Problems:  * No resolved hospital problems. [place] [facility] Stay  Narrative of Hospital Course:  Patient was admitted after right groin arterial reconstruction with endarterectomy and reimplantation of the femorofemoral bypass graft after bovine pericardial patch angioplasty. The patient is doing well and has no significant complaints. On postoperative day 1 she was requiring some oxygen preventing her discharge to home and she was sent to the stepdown unit. On postoperative day 2 she has had approximately 2 L of oxygen with good saturations. She is ambulating in the halls and tolerating a diet. Consultants:  IP CONSULT TO HOSPITALISTIP CONSULT TO CRITICAL CARE    Surgeries/procedures Performed:  As above. Treatments:    Analgesia and Surgery    Acetaminophen and Other    Discharge Plan/Disposition:  Home    Hospital/Incidental Findings Requiring Follow Up:    Patient Instructions:    Diet: Regular Diet    Activity:Activity as Tolerated and No Driving for 2 Weeks  For number of days (if applicable): Other Instructions: The patient was instructed to perform light activities. I encourage ambulation and I think this will help her. She was instructed not to smoke. She can resume driving after 2 weeks. She is allowed to shower. Provider Follow-Up:   No follow-ups on file.      Significant Diagnostic Studies:    Recent Labs:  Admission on 07/06/2022POC Glucose                                   Date: 07/06/2022Value: 161*        Ref range: 65 - 105 mg/dL     Status: FinalPOC Glucose                                   Date: 07/06/2022Value: 171*        Ref range: 65 - 105 mg/dL     Status: FinalPOC Glucose                                   Date: 07/06/2022Value: 184*        Ref range: 65 - 105 mg/dL     Status: FinalGlucose                                       Date: 07/07/2022Value: 161*        Ref range: 70 - 99 mg/dL      Status: FinalBUN                                           Date: 07/07/2022Value: 18          Ref range: 8 - 23 mg/dL       Status: FinalCREATININE                                    Date: 07/07/2022Value: 1.02*       Ref range: 0.50 - 0.90 mg/dL  Status: FinalCalcium                                       Date: 07/07/2022Value: 8.3*        Ref range: 8.6 - 10.4 mg/dL   Status: FinalSodium                                        Date: 07/07/2022Value: 139         Ref range: 135 - 144 mmol/L   Status: FinalPotassium                                     Date: 07/07/2022Value: 5.2         Ref range: 3.7 - 5.3 mmol/L   Status: FinalChloride                                      Date: 07/07/2022Value: 105         Ref range: 98 - 107 mmol/L    Status: FinalCO2                                           Date: 07/07/2022Value: 25          Ref range: 20 - 31 mmol/L     Status: FinalAnion Gap                                     Date: 07/07/2022Value: 9           Ref range: 9 - 17 mmol/L      Status: FinalGFR Non-                      Date: 07/07/2022Value: 54*         Ref range: >60 mL/min         Status: FinalGFR                           Date: 07/07/2022Value: >60         Ref range: >60 mL/min         Status: FinalGFR Comment                                   Date: 07/07/2022Value:               Status: Final              Comment: Average GFR for 61-76 years old: 80 mL/min/1.73sq [facility]: <60 mL/min/1.73sq mKidney failure: <15 mL/min/1.73sq m        eGFR calculated using average adult body mass.  Additional eGFR calculator available at:    [email or web address]    WBC                                           Date: 07/07/2022Value: 14.7*       Ref range: 3.5 - 11.0 k/uL    Status: FinalRBC                                           Date: 07/07/2022Value: 4.10        Ref range: 4.0 - 5.2 m/uL     Status: FinalHemoglobin                                    Date: 07/07/2022Value: 12.4        Ref range: 12.0 - 16.0 g/dL   Status: FinalHematocrit                                    Date: 07/07/2022Value: 38.4        Ref range: 36 - 46 %          Status: FinalMCV                                           Date: 07/07/2022Value: 93.5        Ref range: 80 - 100 fL        Status: [address]                                           Date: 07/07/2022Value: 30.1        Ref range: 26 - 34 pg         Status: [address]                                          Date: 07/07/2022Value: 32.2        Ref range: 31 - 37 g/dL       Status: FinalRDW                                           Date: 07/07/2022Value: 15.0*       Ref range: 11.5 - 14.9 %      Status: FinalPlatelets                                     Date: 07/07/2022Value: 175         Ref range: 150 - 450 k/uL     Status: FinalMPV                                           Date: 07/07/2022Value: 8.1         Ref range: 6.0 - 12.0 fL      Status: FinalSeg Neutrophils                               Date: 07/07/2022Value: 81*         Ref range: 36 - 66 %          Status: FinalLymphocytes                                   Date: 07/07/2022Value: 11*         Ref range: 24 - 44 %          Status: FinalMonocytes                                     Date: 07/07/2022Value: 7           Ref range: 1 - 7 %            Status: FinalEosinophils %                                 Date: 07/07/2022Value: 0           Ref range: 0 - 4 % within the first 6 hours of systemic infection may still be low. Retesting may be indicated. Values from day 1 and day 4 can be entered into the Change in Procalcitonin Calculator ([email or web address]-[email or web address]. [email or web address]) to determine the patient's Mortality Risk Prognosis    In healthy neonates, plasma Procalcitonin (PCT) concentrations increase gradually after birth, reaching peak values at about 24 hours of age then decr                       ease to normal values below 0.5 ng/mL by 48-72 hours of age. Specimen Description                          Date: 07/07/2022Value: . EXPECTORATED SPUTUM                     Status: PreliminaryDirect Exam                                   Date: 07/07/2022Value: < 10 EPITHELIAL CELLS/LPF                     Status: PreliminaryDirect Exam                                   Date: 07/07/2022Value: <10 NEUTROPHILS/LPF                     Status: PreliminaryDirect Exam                                   Date: 07/07/2022Value: NO BACTERIA SEEN                     Status: PreliminaryCulture                                       Date: 07/07/2022Value: PENDING       Status: PreliminaryPOC Glucose                                   Date: 07/07/2022Value: 141*        Ref range: 65 - 105 mg/dL     Status: Final              Comment: Critical NotedPOC Glucose                                   Date: 07/07/2022Value: 147*        Ref range: 65 - 105 mg/dL     Status: FinalPOC Glucose                                   Date: 07/07/2022Value: 182*        Ref range: 65 - 105 mg/dL     Status: FinalPOC Glucose                                   Date: 07/07/2022Value: 151*        Ref range: 65 - 105 mg/dL     Status: FinalPOC Glucose                                   Date: 07/08/2022Value: 148*        Ref range: 65 - 105 mg/dL     Status: Final------------    Radiology last 7 days:  XR CHEST PORTABLEResult Date: 7/7/2022Mild prominence of the vascularity.       Pending Labs   Order Current Status  SURGICAL PATHOLOGY days.Qty: 270 capsule Refills: 1fluocinonide (LIDEX) 0.05 % ointmentApply topically 2 times daily Apply topically 2 times daily. acetaminophen (TYLENOL) 500 MG tabletTake 500 mg by mouth as needed for PainARIPiprazole (ABILIFY) 5 MG tablet5 mg daily ONETOUCH DELICA LANCETS 18R MISCCheck BS bid and prnQty: 100 each Refills: 5Associated Diagnoses:Type 2 diabetes mellitus without complication, without long-term current use of insulin ([facility])ONE TOUCH ULTRA TEST stripUSE ONE STRIP TO TEST TWICE A DAYQty: 100 strip Refills: 1Associated Diagnoses:Type 2 diabetes mellitus without complication, without long-term current use of insulin ([facility])aspirin EC 81 MG EC tabletTake 1 tablet by mouth dailyQty: 128 tablet Refills: 0FETZIMA 120 MG CP24 ER capsuleTake 120 mg by mouth daily traZODone (DESYREL) 100 MG tabletTake 100 mg by mouth nightly. Indications: 2 tabs nightly    Current Discharge Medication List    Time Spent on Discharge:E] minutes were spent in patient examination, evaluation, counseling as well as medication reconciliation, prescriptions for required medications, discharge plan, and follow up.     Electronically signed by Kelly Espinal MD on 7/8/22 at 7:03 AM EDT

## 2022-07-08 NOTE — PROGRESS NOTES
[address]    PROGRESS NOTE             7/8/2022    8:59 AM    Name:   Geovanny Galvan  MRN:     [id number]     Acct:      [de-identified]   Room:   2090/2090-01  IP Day:  2  Admit Date:  7/6/2022  7:02 AM    PCP:  Isabelle Bautista PA-C  Code Status:  Full Code    Subjective:     C/C: No chief complaint on file. Interval History Status:   Examined at bedside this morning. Patient is ready for discharge just awaiting med rec's. Slept well overnight, denies any shortness of breath. currently on room air saturating well. Still states that she has some numbness and pain in her right knee, will follow-up with PCP if this continues after 2 to 3 weeks. Still complains a little bit of surgical site pain as well. Denies any shortness of breath, chest pain, abdominal pain headaches or dizziness. Anticipating discharge today, just waiting on medications. Brief History:     The patient is a 58 y. o. female who presents post-right common femoral and SFA endarterectomy and revision of the femorofemoral bypass on the right. The patient has a history of bilateral lower extremity atherosclerosis, CKD, COPD (PFT not available), hypertension, DM, and depressive disorder. She was admitted to the ICU for post-operative care, where she required 5 L of oxygen. She was otherwise hemodynamically stable (BP is 128/63, and pulse is 108). Review of Systems:     Review of Systems   Constitutional: Negative for chills and fever. HENT: Negative for trouble swallowing. Eyes: Negative for visual disturbance. Respiratory: Negative for shortness of breath. Cardiovascular: Negative for chest pain and leg swelling. Gastrointestinal: Negative for abdominal pain, diarrhea, nausea and vomiting. Genitourinary: Negative for difficulty urinating and hematuria. Musculoskeletal:        Pain and numbness in the right knee. Skin: Negative for color change. Neurological: Positive for numbness (Right knee. ). Negative for headaches. Psychiatric/Behavioral: Negative for behavioral problems and confusion. Medications: Allergies:  No Known Allergies    Current Meds:   Scheduled Meds:    albuterol  2.5 mg Nebulization 4x daily    levomilnacipran  120 mg Oral Daily    ARIPiprazole  5 mg Oral Daily    aspirin EC  81 mg Oral Daily    famotidine  20 mg Oral BID    glipiZIDE  5 mg Oral BID AC    losartan  50 mg Oral Daily    atorvastatin  40 mg Oral Daily    traZODone  100 mg Oral Nightly    tiotropium  2 puff Inhalation Daily    sodium chloride flush  5-40 mL IntraVENous 2 times per day    insulin lispro  0-6 Units SubCUTAneous TID WC    insulin lispro  0-3 Units SubCUTAneous Nightly    metoprolol succinate  25 mg Oral Daily     Continuous Infusions:    sodium chloride      dextrose       PRN Meds: acetaminophen, sodium chloride flush, sodium chloride, ondansetron **OR** ondansetron, oxyCODONE **OR** oxyCODONE, glucose, dextrose bolus **OR** dextrose bolus, glucagon (rDNA), dextrose, hydrALAZINE    Data:     Past Medical History:   has a past medical history of AA (aortic aneurysm) ([facility] Anxiety, Atherosclerosis of native artery of both lower extremities with intermittent claudication ([facility] Blood clot in vein, Bronchitis, Cerebral infarction ([facility] Cervical cancer ([facility] CKD (chronic kidney disease) stage 3, GFR 30-59 ml/min ([facility]), COPD (chronic obstructive pulmonary disease) ([address] Depression, Diabetes mellitus ([address] Dizziness, Eczema, Hx of blood clots, Hyperlipidemia, Hyperlipidemia, Hypertension, Insomnia, Limb ischemia, Loose, teeth, Nephrolith, MAMIE (obstructive sleep apnea), Peripheral vascular disease ([address] Stenosis of carotid artery, Suicide attempt ([address] and Wears dentures. Social History:   reports that she has been smoking cigarettes. She started smoking about 45 years ago. She has a 30.00 pack-year smoking history.  She has never used smokeless tobacco. She reports previous alcohol use. She reports that she does not use drugs. Family History:   Family History   Problem Relation Age of Onset    Thyroid Disease Mother     Other Mother         renal failure,thyroidectomy    Diabetes Mother     Kidney Disease Mother     Heart Disease Father     Diabetes Brother     High Blood Pressure Brother     Coronary Art Dis Brother         Patient denies    Heart Attack Brother        Vitals:  /66   Pulse (!) 109   Temp 98.5 °F (36.9 °C) (Oral)   Resp 20   Ht 5' 6\" (1.676 m)   Wt 171 lb 15.3 oz (78 kg)   LMP 1996   SpO2 95%   BMI 27.75 kg/m²   Temp (24hrs), Av.7 °F (37.1 °C), Min:98.5 °F (36.9 °C), Max:99.2 °F (37.3 °C)    Recent Labs     22  1637 22  2325 22  0603   POCGLU 147* 182* 151* 148*       I/O(24Hr):     Intake/Output Summary (Last 24 hours) at 2022 0859  Last data filed at 2022 0559  Gross per 24 hour   Intake 10 ml   Output 1100 ml   Net -1090 ml       Labs:  [unfilled]    Lab Results   Component Value Date/Time    SPECIAL NOT REPORTED 2020 10:16 PM     Lab Results   Component Value Date/Time    CULTURE PENDING 2022 11:20 AM       [unfilled]    Radiology:    ECHO Complete 2D W Doppler W Color    Result Date: 2022  [facility] Transthoracic Echocardiography Report (TTE)  Patient Name Ginger Triana     Date of Study             2022               SARAH WOODY   Date of      1960  Gender                    Female  Birth   Age          57 year(s)  Race                         Room Number              Height:                   66 inch, 167.64 cm   Corporate ID [id number]    Weight:                   172 pounds, 78 kg  #   Patient Acct [de-identified]   BSA:         1.88 m^2     BMI:       27.76 kg/m^2  #   MR #         [id number]      Sonographer               Lyly Dempsey   Accession #  [id number]  Interpreting Physician Kevin Griffin   Fellow                   Referring Nurse                           Practitioner   Interpreting             Referring Physician       Michelle Carlos MD  Fellow  Type of Study   TTE procedure:2D Echocardiogram, M-Mode, Doppler, Color Doppler. Procedure Date Date: 06/18/2022 Start: 08:42 AM Study Location: [address] [place] [address] Technical Quality: Fair visualization Indications:Preop cardiac evaluation. Patient Status: Outpatient Height: 66 inches Weight: 172 pounds BSA: 1.88 m^2 BMI: 27.76 kg/m^2 Rhythm: Within normal limits HR: 92 bpm BP: 138/70 mmHg CONCLUSIONS Summary Left ventricle is normal in size and wall thickness. Global left ventricular systolic function is normal. Estimated LV EF 60--65 %. No obvious wall motion abnormality seen. Left atrium is normal in size. No significant valvular regurgitation or stenosis seen. Signature ----------------------------------------------------------------------------  Electronically signed by Lyly Dempsey(Sonographer) on 06/18/2022 10:21  AM ---------------------------------------------------------------------------- ----------------------------------------------------------------------------  Electronically signed by Kevin Griffin(Interpreting physician) on  06/18/2022 02:04 PM ---------------------------------------------------------------------------- FINDINGS Left Atrium Left atrium is normal in size. Left Ventricle Left ventricle is normal in size and wall thickness. Global left ventricular systolic function is normal. Estimated LV EF 60--65 %. No obvious wall motion abnormality seen. Right Atrium Right atrium is normal in size. Right Ventricle Normal right ventricular size and function. Mitral Valve No obvious valvular abnormality seen. No evidence of mitral regurgitation. Aortic Valve No obvious valvular abnormality seen. No evidence of aortic insufficiency or stenosis. Tricuspid Valve No obvious valvular abnormality seen.  Insignificant tricuspid regurgitation, unable to estimate RVSP. Pulmonic Valve Pulmonic valve was not well visualized. No evidence of pulmonic insufficiency or stenosis. Pericardial Effusion No significant pericardial effusion is seen. Pleural Effusion No pleural effusion seen. Miscellaneous Normal aortic root dimension. E/E' average = 11.3. M-mode / 2D Measurements & Calculations:   LVIDd:4.04 cm(3.7 - 5.6 cm)      Diastolic HFISHM:90.2 ml  CRWIQ:7.23 cm(2.2 - 4.0 cm)      Systolic OQVOSP:3.51 ml  VKIO:5.56 cm(0.6 - 1.1 cm)       Aortic Root:2.3 cm(2.0 - 3.7 cm)  LVPWd:0.9 cm(0.6 - 1.1 cm)       LA Dimension: 2.7 cm(1.9 - 4.0 cm)  Fractional Shortenin.73 %    LA volume/Index: 25.8 ml /14m^2  Calculated LVEF (%): 88.76 %     LVOT:2.1 cm   Mitral:                              Aortic   Peak E-Wave: 0.76 m/s                Peak Velocity: 1.13 m/s  Peak A-Wave: 1.11 m/s                Mean Velocity: 0.79 m/s  E/A Ratio: 0.68                      Peak Gradient: 5.11 mmHg  Peak Gradient: 2.29 mmHg             Mean Gradient: 3 mmHg  Deceleration Time: 208 msec                                        Area (continuity): 2.78 cm^2                                       AV VTI: 22.2 cm  Septal Wall E' velocity:0.06 m/s Lateral Wall E' velocity:0.08 m/s    XR CHEST PORTABLE    Result Date: 2022  EXAMINATION: ONE XRAY VIEW OF THE CHEST 2022 5:56 am COMPARISON: 2016 HISTORY: ORDERING SYSTEM PROVIDED HISTORY: desaturation, post op TECHNOLOGIST PROVIDED HISTORY: desaturation, post op Reason for Exam: desaturation, post op Additional signs and symptoms: desaturation, post op Relevant Medical/Surgical History: desaturation, post op FINDINGS: AP portable view of the chest time stamped at 523 hours demonstrates overlying cardiac monitoring electrodes. Heart size is normal.  No focal consolidation, effusion, or pneumothorax is noted. Osseous and mediastinal structures are age-appropriate.   Haziness is present at the left base likely due to overlying soft tissues. Vascularity is minimally prominent. Mild prominence of the vascularity. Physical Examination:        Physical Exam  Constitutional:       Appearance: Normal appearance. HENT:      Head: Normocephalic and atraumatic. Nose: Nose normal.      Mouth/Throat:      Mouth: Mucous membranes are moist.   Eyes:      Extraocular Movements: Extraocular movements intact. Cardiovascular:      Rate and Rhythm: Normal rate and regular rhythm. Pulses: Normal pulses. Heart sounds: Normal heart sounds. Pulmonary:      Effort: Pulmonary effort is normal.      Breath sounds: Normal breath sounds. Abdominal:      General: Abdomen is flat. Palpations: Abdomen is soft. Musculoskeletal:         General: Normal range of motion. Cervical back: Neck supple. Skin:     General: Skin is warm and dry. Neurological:      General: No focal deficit present. Mental Status: She is alert and oriented to person, place, and time.    Psychiatric:         Mood and Affect: Mood normal.         Behavior: Behavior normal.           Assessment:        Primary Problem  Atherosclerosis of native artery of both lower extremities with intermittent claudication [facility])    Active Hospital Problems    Diagnosis Date Noted    Mild basilar atelectasis of both lungs [J98.11] 07/07/2022     Priority: Medium    Hypoxia on admission to icu [R09.02] 07/07/2022     Priority: Medium    Atherosclerosis of native artery of both lower extremities with intermittent claudication [facility]) [I70.213] 05/16/2022     Priority: Medium       Plan:        Bilateral lower extremity atherosclerosis  -Post right common femoral and SFA endarterectomy with bovine pericardial patch angioplasty and revision of the femoral-femoral bypass on the right replacement approximately bypass.  -Postop chest x-ray showed bilateral lower lobe atelectasis/fluid  -Respiratory cultures negative to date  -Continue oxycodone for pain   -Continue Lipitor  -Continue Aspirin    Hypertension  -Continue home blood pressure medications Cozaar and metoprolol. Diabetes mellitus  -Continue home medications (glipizide)    Major depressive disorder  -Continue home medications, Abilify, Fetzima and trazodone    Plan for discharge today, med rec's placed, pain medication sent to hospital pharmacy. Diet: Regular adult diet  CODE STATUS: Full code        Howard Samaniego MD  7/8/2022  8:59 AM     Attestation and add on       I have discussed the care of Tristen Miller , including pertinent history and exam findings,      7/8/22    with the resident. I have seen and examined the patient and the key elements of all parts of the encounter have been performed by me . I agree with the assessment, plan and orders as documented by the resident. Hospital Problems           Last Modified POA    * (Principal) Atherosclerosis of native artery of both lower extremities with intermittent claudication ([address] 7/6/2022 Yes    Mild basilar atelectasis of both lungs 7/7/2022 Yes    Hypoxia on admission to icu 7/7/2022 Yes             ''''''''''       MD JOAQUIN Boyer [address], [address] [place] [address].    Phone [phone]   Fax: [phone]  Answering Service: [phone]

## 2022-07-08 NOTE — PROGRESS NOTES
Discharge teaching and instructions for diagnosis/procedure of COPD and Surgery completed with patient using teachback method. AVS reviewed. Printed prescriptions given to patient. Patient voiced understanding regarding prescriptions, follow up appointments, and care of self at home.  Discharged in a wheelchair to  home with support per family

## 2022-07-16 PROBLEM — Z01.818 PREOP EXAMINATION: Status: RESOLVED | Noted: 2022-06-16 | Resolved: 2022-07-16

## 2022-07-22 ENCOUNTER — TELEPHONE (OUTPATIENT)
Dept: VASCULAR SURGERY | Age: 62
End: 2022-07-22

## 2022-07-25 ENCOUNTER — OFFICE VISIT (OUTPATIENT)
Dept: VASCULAR SURGERY | Age: 62
End: 2022-07-25

## 2022-07-25 VITALS
WEIGHT: 175 LBS | OXYGEN SATURATION: 98 % | HEIGHT: 66 IN | RESPIRATION RATE: 20 BRPM | TEMPERATURE: 98.2 F | HEART RATE: 102 BPM | SYSTOLIC BLOOD PRESSURE: 136 MMHG | DIASTOLIC BLOOD PRESSURE: 64 MMHG | BODY MASS INDEX: 28.12 KG/M2

## 2022-07-25 DIAGNOSIS — Z09 POSTOPERATIVE EXAMINATION: ICD-10-CM

## 2022-07-25 DIAGNOSIS — I70.213 ATHEROSCLEROSIS OF NATIVE ARTERY OF BOTH LOWER EXTREMITIES WITH INTERMITTENT CLAUDICATION (HCC): Primary | ICD-10-CM

## 2022-07-25 PROCEDURE — 99024 POSTOP FOLLOW-UP VISIT: CPT | Performed by: SURGERY

## 2022-07-25 NOTE — PROGRESS NOTES
Postop Progress Note    Robby Nunes presents to the office for postop follow up. Recall that 3 weeks ago we had performed a right common femoral reconstruction due to elevated velocities just above the femorofemoral bypass graft. Reconstruction revealed that the artery was in fact dissected and needed endarterectomy. The femorofemoral bypass graft did not come off of the common femoral artery but came off of the superficial femoral artery proximally. This was reconstructed such that I made a patch down onto the superficial femoral artery and then brought the graft off of that region spanning from about the takeoff of the profunda and down to the proximal SFA. The patient has been doing well and has not had any significant problems. She is walking well with the same amount of claudication. She admits to not walking very far. She continues to smoke cigarettes despite warnings to the contrary. Objective    Vitals:    07/25/22 1527   BP: 136/64   Pulse: (!) 102   Resp: 20   Temp: 98.2 °F (36.8 °C)   SpO2: 98%     General: alert, cooperative and no distress  Incision: healing well. The incision is intact without necrosis. There are no draining sinuses. There is no erythema. There is no seroma or hematoma. The sutures are still intact. There is a palpable femoral pulse bilaterally. Assessment  Doing well postoperatively. Plan  I removed the sutures today. No Steri-Strips were placed. The skin was intact and adhering well. We will have her follow-up with a bypass graft duplex and common femoral duplex bilaterally to reevaluate the velocities in those regions. We will also have a repeat ALEXANDRA. We will have her back in 6 to 8 weeks.     Electronically signed by Jeffrey Manuel MD on 7/25/2022 at 3:41 PM

## 2022-08-08 ENCOUNTER — HOSPITAL ENCOUNTER (OUTPATIENT)
Dept: VASCULAR LAB | Age: 62
Discharge: HOME | End: 2022-08-08
Payer: COMMERCIAL

## 2022-08-08 DIAGNOSIS — I70.213 ATHEROSCLEROSIS OF NATIVE ARTERY OF BOTH LOWER EXTREMITIES WITH INTERMITTENT CLAUDICATION (HCC): ICD-10-CM

## 2022-08-08 PROCEDURE — 93925 LOWER EXTREMITY STUDY: CPT

## 2022-08-08 PROCEDURE — 93923 UPR/LXTR ART STDY 3+ LVLS: CPT

## 2022-09-12 ENCOUNTER — OFFICE VISIT (OUTPATIENT)
Dept: VASCULAR SURGERY | Age: 62
End: 2022-09-12

## 2022-09-12 VITALS — SYSTOLIC BLOOD PRESSURE: 131 MMHG | DIASTOLIC BLOOD PRESSURE: 78 MMHG | HEART RATE: 86 BPM | OXYGEN SATURATION: 98 %

## 2022-09-12 DIAGNOSIS — I70.213 ATHEROSCLER OF NATIVE ARTERY OF BOTH LEGS WITH INTERMIT CLAUDICATION (HCC): Primary | ICD-10-CM

## 2022-09-12 PROCEDURE — 99024 POSTOP FOLLOW-UP VISIT: CPT | Performed by: SURGERY

## 2022-09-12 RX ORDER — GABAPENTIN 100 MG/1
100 CAPSULE ORAL 3 TIMES DAILY
Qty: 270 CAPSULE | Refills: 1 | Status: SHIPPED | OUTPATIENT
Start: 2022-09-12 | End: 2023-03-11

## 2022-10-14 ENCOUNTER — HOSPITAL ENCOUNTER (OUTPATIENT)
Age: 62
Setting detail: SPECIMEN
Discharge: HOME | End: 2022-10-14

## 2022-10-14 DIAGNOSIS — E66.3 OVERWEIGHT (BMI 25.0-29.9): ICD-10-CM

## 2022-10-14 DIAGNOSIS — E55.9 VITAMIN D DEFICIENCY: ICD-10-CM

## 2022-10-14 DIAGNOSIS — E13.9 SECONDARY DIABETES MELLITUS (HCC): ICD-10-CM

## 2022-10-14 LAB — VITAMIN D 25-HYDROXY: 42.6 NG/ML

## 2022-10-16 LAB
ESTIMATED AVERAGE GLUCOSE: 163 MG/DL
HBA1C MFR BLD: 7.3 % (ref 4–6)

## 2023-03-13 ENCOUNTER — HOSPITAL ENCOUNTER (OUTPATIENT)
Dept: VASCULAR LAB | Age: 63
Discharge: HOME | End: 2023-03-13
Payer: COMMERCIAL

## 2023-03-13 ENCOUNTER — HOSPITAL ENCOUNTER (OUTPATIENT)
Age: 63
Discharge: HOME | End: 2023-03-13
Payer: COMMERCIAL

## 2023-03-13 DIAGNOSIS — E83.51 HYPOCALCEMIA: ICD-10-CM

## 2023-03-13 DIAGNOSIS — N20.0 KIDNEY STONE: ICD-10-CM

## 2023-03-13 DIAGNOSIS — E13.22 SECONDARY DIABETES MELLITUS WITH STAGE 2 CHRONIC KIDNEY DISEASE (HCC): ICD-10-CM

## 2023-03-13 DIAGNOSIS — I70.213 ATHEROSCLER OF NATIVE ARTERY OF BOTH LEGS WITH INTERMIT CLAUDICATION (HCC): ICD-10-CM

## 2023-03-13 DIAGNOSIS — I12.9 BENIGN HYPERTENSION WITH CKD (CHRONIC KIDNEY DISEASE), STAGE II: ICD-10-CM

## 2023-03-13 DIAGNOSIS — N18.2 CKD (CHRONIC KIDNEY DISEASE), STAGE II: ICD-10-CM

## 2023-03-13 DIAGNOSIS — N18.2 SECONDARY DIABETES MELLITUS WITH STAGE 2 CHRONIC KIDNEY DISEASE (HCC): ICD-10-CM

## 2023-03-13 DIAGNOSIS — N18.2 BENIGN HYPERTENSION WITH CKD (CHRONIC KIDNEY DISEASE), STAGE II: ICD-10-CM

## 2023-03-13 LAB
ABSOLUTE EOS #: 0.2 K/UL (ref 0–0.4)
ABSOLUTE LYMPH #: 2.6 K/UL (ref 1–4.8)
ABSOLUTE MONO #: 1 K/UL (ref 0.1–1.3)
ANION GAP SERPL CALCULATED.3IONS-SCNC: 9 MMOL/L (ref 9–17)
BACTERIA: ABNORMAL
BASOPHILS # BLD: 1 % (ref 0–2)
BASOPHILS ABSOLUTE: 0.1 K/UL (ref 0–0.2)
BILIRUBIN URINE: NEGATIVE
BUN SERPL-MCNC: 19 MG/DL (ref 8–23)
CALCIUM SERPL-MCNC: 9.1 MG/DL (ref 8.6–10.4)
CASTS UA: ABNORMAL /LPF
CHLORIDE SERPL-SCNC: 93 MMOL/L (ref 98–107)
CO2 SERPL-SCNC: 28 MMOL/L (ref 20–31)
COLOR: YELLOW
CREAT SERPL-MCNC: 1.23 MG/DL (ref 0.5–0.9)
EOSINOPHILS RELATIVE PERCENT: 2 % (ref 0–4)
EPITHELIAL CELLS UA: ABNORMAL /HPF
GFR SERPL CREATININE-BSD FRML MDRD: 49 ML/MIN/1.73M2
GLUCOSE SERPL-MCNC: 171 MG/DL (ref 70–99)
GLUCOSE UR STRIP.AUTO-MCNC: NEGATIVE MG/DL
HCT VFR BLD AUTO: 40.1 % (ref 36–46)
HGB BLD-MCNC: 13.6 G/DL (ref 12–16)
KETONES UR STRIP.AUTO-MCNC: NEGATIVE MG/DL
LEUKOCYTE ESTERASE UR QL STRIP.AUTO: ABNORMAL
LYMPHOCYTES # BLD: 23 % (ref 24–44)
MAGNESIUM SERPL-MCNC: 1.9 MG/DL (ref 1.6–2.6)
MCH RBC QN AUTO: 31.2 PG (ref 26–34)
MCHC RBC AUTO-ENTMCNC: 33.9 G/DL (ref 31–37)
MCV RBC AUTO: 92.1 FL (ref 80–100)
MONOCYTES # BLD: 8 % (ref 1–7)
NITRITE UR QL STRIP.AUTO: NEGATIVE
PDW BLD-RTO: 14.8 % (ref 11.5–14.9)
PHOSPHATE SERPL-MCNC: 3.7 MG/DL (ref 2.6–4.5)
PLATELET # BLD AUTO: 199 K/UL (ref 150–450)
PMV BLD AUTO: 9 FL (ref 6–12)
POTASSIUM SERPL-SCNC: 4.4 MMOL/L (ref 3.7–5.3)
PROT UR STRIP.AUTO-MCNC: 6 MG/DL (ref 5–8)
PROT UR STRIP.AUTO-MCNC: ABNORMAL MG/DL
RBC # BLD: 4.36 M/UL (ref 4–5.2)
RBC CLUMPS #/AREA URNS AUTO: ABNORMAL /HPF
SEG NEUTROPHILS: 66 % (ref 36–66)
SEGMENTED NEUTROPHILS ABSOLUTE COUNT: 7.7 K/UL (ref 1.3–9.1)
SODIUM SERPL-SCNC: 130 MMOL/L (ref 135–144)
SPECIFIC GRAVITY UA: 1.01 (ref 1–1.03)
TURBIDITY: ABNORMAL
URINE HGB: NEGATIVE
UROBILINOGEN, URINE: NORMAL
WBC # BLD AUTO: 11.7 K/UL (ref 3.5–11)
WBC UA: ABNORMAL /HPF

## 2023-03-13 PROCEDURE — 85025 COMPLETE CBC W/AUTO DIFF WBC: CPT

## 2023-03-13 PROCEDURE — 81001 URINALYSIS AUTO W/SCOPE: CPT

## 2023-03-13 PROCEDURE — 80048 BASIC METABOLIC PNL TOTAL CA: CPT

## 2023-03-13 PROCEDURE — 83735 ASSAY OF MAGNESIUM: CPT

## 2023-03-13 PROCEDURE — 36415 COLL VENOUS BLD VENIPUNCTURE: CPT

## 2023-03-13 PROCEDURE — 84100 ASSAY OF PHOSPHORUS: CPT

## 2023-03-13 PROCEDURE — 93925 LOWER EXTREMITY STUDY: CPT

## 2023-03-13 PROCEDURE — 93923 UPR/LXTR ART STDY 3+ LVLS: CPT

## 2023-03-13 PROCEDURE — 87086 URINE CULTURE/COLONY COUNT: CPT

## 2023-03-14 LAB
MICROORGANISM SPEC CULT: NORMAL
SPECIMEN DESCRIPTION: NORMAL

## 2023-03-20 ENCOUNTER — OFFICE VISIT (OUTPATIENT)
Dept: VASCULAR SURGERY | Age: 63
End: 2023-03-20
Payer: COMMERCIAL

## 2023-03-20 VITALS
TEMPERATURE: 96.8 F | HEIGHT: 66 IN | BODY MASS INDEX: 27.8 KG/M2 | SYSTOLIC BLOOD PRESSURE: 138 MMHG | OXYGEN SATURATION: 94 % | HEART RATE: 114 BPM | WEIGHT: 173 LBS | DIASTOLIC BLOOD PRESSURE: 84 MMHG | RESPIRATION RATE: 16 BRPM

## 2023-03-20 DIAGNOSIS — I70.211 ATHEROSCLEROSIS OF NATIVE ARTERY OF RIGHT LOWER EXTREMITY WITH INTERMITTENT CLAUDICATION (HCC): ICD-10-CM

## 2023-03-20 DIAGNOSIS — Z95.828 S/P FEMORAL-FEMORAL BYPASS SURGERY: Primary | ICD-10-CM

## 2023-03-20 PROCEDURE — 99214 OFFICE O/P EST MOD 30 MIN: CPT | Performed by: SURGERY

## 2023-03-20 PROCEDURE — 3079F DIAST BP 80-89 MM HG: CPT | Performed by: SURGERY

## 2023-03-20 PROCEDURE — 3075F SYST BP GE 130 - 139MM HG: CPT | Performed by: SURGERY

## 2023-03-20 ASSESSMENT — ENCOUNTER SYMPTOMS
ABDOMINAL DISTENTION: 0
TROUBLE SWALLOWING: 0
SHORTNESS OF BREATH: 0
ABDOMINAL PAIN: 0
COLOR CHANGE: 0
CHEST TIGHTNESS: 0
VOMITING: 0
BACK PAIN: 1
EYE PAIN: 0
COUGH: 0
VOICE CHANGE: 0

## 2023-03-20 NOTE — PROGRESS NOTES
terribly interested in smoking cessation completely. To her credit she has cut back significantly. We will see her back with a femorofemoral bypass graft duplex and ALEXANDRA and PVR in 3 months.     Electronically signed by:  Epi Larsen MD

## 2023-03-30 ENCOUNTER — TELEPHONE (OUTPATIENT)
Dept: ONCOLOGY | Age: 63
End: 2023-03-30

## 2023-03-30 DIAGNOSIS — Z87.891 PERSONAL HISTORY OF NICOTINE DEPENDENCE: Primary | ICD-10-CM

## 2023-03-30 NOTE — TELEPHONE ENCOUNTER
Our records indicate that your patient is coming due for their annual lung cancer screening follow up testing. For your convenience, we have pended the order for the scan for you. If you do not agree with the need for the test, please cancel the order and let us know. Sincerely,    [address] [place] [address] Screening Program    Auto printed reminder letter sent to patient.

## 2023-05-15 ENCOUNTER — HOSPITAL ENCOUNTER (OUTPATIENT)
Dept: CT IMAGING | Age: 63
Discharge: HOME | End: 2023-05-17
Payer: COMMERCIAL

## 2023-05-15 DIAGNOSIS — Z87.891 PERSONAL HISTORY OF NICOTINE DEPENDENCE: ICD-10-CM

## 2023-05-15 PROCEDURE — 71271 CT THORAX LUNG CANCER SCR C-: CPT

## 2023-05-31 ENCOUNTER — HOSPITAL ENCOUNTER (OUTPATIENT)
Age: 63
Discharge: HOME | End: 2023-05-31
Payer: COMMERCIAL

## 2023-05-31 DIAGNOSIS — I12.9 BENIGN HYPERTENSION WITH CKD (CHRONIC KIDNEY DISEASE) STAGE III (HCC): ICD-10-CM

## 2023-05-31 DIAGNOSIS — R80.9 PROTEINURIA, UNSPECIFIED TYPE: ICD-10-CM

## 2023-05-31 DIAGNOSIS — N18.31 STAGE 3A CHRONIC KIDNEY DISEASE (HCC): ICD-10-CM

## 2023-05-31 DIAGNOSIS — E13.22 SECONDARY DIABETES MELLITUS WITH STAGE 3 CHRONIC KIDNEY DISEASE (HCC): ICD-10-CM

## 2023-05-31 DIAGNOSIS — N18.30 SECONDARY DIABETES MELLITUS WITH STAGE 3 CHRONIC KIDNEY DISEASE (HCC): ICD-10-CM

## 2023-05-31 DIAGNOSIS — N20.0 KIDNEY STONE: ICD-10-CM

## 2023-05-31 DIAGNOSIS — E87.1 HYPONATREMIA: ICD-10-CM

## 2023-05-31 DIAGNOSIS — N18.30 BENIGN HYPERTENSION WITH CKD (CHRONIC KIDNEY DISEASE) STAGE III (HCC): ICD-10-CM

## 2023-05-31 LAB
ANION GAP SERPL CALCULATED.3IONS-SCNC: 11 MMOL/L (ref 9–17)
BACTERIA URNS QL MICRO: ABNORMAL
BASOPHILS # BLD: 0.1 K/UL (ref 0–0.2)
BASOPHILS NFR BLD: 1 % (ref 0–2)
BILIRUB UR QL STRIP: NEGATIVE
BUN SERPL-MCNC: 15 MG/DL (ref 8–23)
CALCIUM SERPL-MCNC: 9.4 MG/DL (ref 8.6–10.4)
CASTS #/AREA URNS LPF: ABNORMAL /LPF
CHLORIDE SERPL-SCNC: 98 MMOL/L (ref 98–107)
CLARITY UR: CLEAR
CO2 SERPL-SCNC: 24 MMOL/L (ref 20–31)
COLOR UR: YELLOW
CREAT SERPL-MCNC: 1.29 MG/DL (ref 0.5–0.9)
EOSINOPHIL # BLD: 0.2 K/UL (ref 0–0.4)
EOSINOPHILS RELATIVE PERCENT: 2 % (ref 0–4)
EPI CELLS #/AREA URNS HPF: ABNORMAL /HPF
ERYTHROCYTE [DISTWIDTH] IN BLOOD BY AUTOMATED COUNT: 14.5 % (ref 11.5–14.9)
GFR SERPL CREATININE-BSD FRML MDRD: 46 ML/MIN/1.73M2
GLUCOSE SERPL-MCNC: 190 MG/DL (ref 70–99)
GLUCOSE UR STRIP-MCNC: ABNORMAL MG/DL
HCT VFR BLD AUTO: 40.6 % (ref 36–46)
HGB BLD-MCNC: 13.7 G/DL (ref 12–16)
HGB UR QL STRIP.AUTO: NEGATIVE
KETONES UR STRIP-MCNC: NEGATIVE MG/DL
LEUKOCYTE ESTERASE UR QL STRIP: ABNORMAL
LYMPHOCYTES # BLD: 25 % (ref 24–44)
LYMPHOCYTES NFR BLD: 2.4 K/UL (ref 1–4.8)
MAGNESIUM SERPL-MCNC: 2 MG/DL (ref 1.6–2.6)
MCH RBC QN AUTO: 30.5 PG (ref 26–34)
MCHC RBC AUTO-ENTMCNC: 33.7 G/DL (ref 31–37)
MCV RBC AUTO: 90.4 FL (ref 80–100)
MONOCYTES NFR BLD: 0.7 K/UL (ref 0.1–1.3)
MONOCYTES NFR BLD: 7 % (ref 1–7)
NEUTROPHILS NFR BLD: 65 % (ref 36–66)
NEUTS SEG NFR BLD: 6.3 K/UL (ref 1.3–9.1)
NITRITE UR QL STRIP: NEGATIVE
PH UR STRIP: 6 [PH] (ref 5–8)
PHOSPHATE SERPL-MCNC: 3.6 MG/DL (ref 2.6–4.5)
PLATELET # BLD AUTO: 203 K/UL (ref 150–450)
PMV BLD AUTO: 8.8 FL (ref 6–12)
POTASSIUM SERPL-SCNC: 4.8 MMOL/L (ref 3.7–5.3)
PROT UR STRIP-MCNC: ABNORMAL MG/DL
RBC # BLD AUTO: 4.49 M/UL (ref 4–5.2)
RBC #/AREA URNS HPF: ABNORMAL /HPF
SODIUM SERPL-SCNC: 133 MMOL/L (ref 135–144)
SP GR UR STRIP: 1.01 (ref 1–1.03)
UROBILINOGEN UR STRIP-ACNC: NORMAL
WBC #/AREA URNS HPF: ABNORMAL /HPF
WBC OTHER # BLD: 9.6 K/UL (ref 3.5–11)

## 2023-05-31 PROCEDURE — 84100 ASSAY OF PHOSPHORUS: CPT

## 2023-05-31 PROCEDURE — 85025 COMPLETE CBC W/AUTO DIFF WBC: CPT

## 2023-05-31 PROCEDURE — 80048 BASIC METABOLIC PNL TOTAL CA: CPT

## 2023-05-31 PROCEDURE — 36415 COLL VENOUS BLD VENIPUNCTURE: CPT

## 2023-05-31 PROCEDURE — 83735 ASSAY OF MAGNESIUM: CPT

## 2023-05-31 PROCEDURE — 81001 URINALYSIS AUTO W/SCOPE: CPT

## 2023-06-29 ENCOUNTER — HOSPITAL ENCOUNTER (OUTPATIENT)
Dept: VASCULAR LAB | Age: 63
Discharge: HOME | End: 2023-06-29
Payer: COMMERCIAL

## 2023-06-29 DIAGNOSIS — I70.211 ATHEROSCLEROSIS OF NATIVE ARTERY OF RIGHT LOWER EXTREMITY WITH INTERMITTENT CLAUDICATION (HCC): ICD-10-CM

## 2023-06-29 DIAGNOSIS — Z95.828 S/P FEMORAL-FEMORAL BYPASS SURGERY: ICD-10-CM

## 2023-06-29 PROCEDURE — 93925 LOWER EXTREMITY STUDY: CPT

## 2023-07-10 ENCOUNTER — OFFICE VISIT (OUTPATIENT)
Dept: VASCULAR SURGERY | Age: 63
End: 2023-07-10
Payer: COMMERCIAL

## 2023-07-10 VITALS
HEIGHT: 66 IN | DIASTOLIC BLOOD PRESSURE: 64 MMHG | WEIGHT: 160.4 LBS | OXYGEN SATURATION: 94 % | BODY MASS INDEX: 25.78 KG/M2 | HEART RATE: 95 BPM | TEMPERATURE: 98.1 F | SYSTOLIC BLOOD PRESSURE: 138 MMHG | RESPIRATION RATE: 20 BRPM

## 2023-07-10 DIAGNOSIS — I71.43 INFRARENAL ABDOMINAL AORTIC ANEURYSM (AAA) WITHOUT RUPTURE (HCC): ICD-10-CM

## 2023-07-10 DIAGNOSIS — I65.23 BILATERAL CAROTID ARTERY STENOSIS: ICD-10-CM

## 2023-07-10 DIAGNOSIS — I70.213 ATHEROSCLEROSIS OF NATIVE ARTERY OF BOTH LOWER EXTREMITIES WITH INTERMITTENT CLAUDICATION (HCC): Primary | ICD-10-CM

## 2023-07-10 PROCEDURE — 3078F DIAST BP <80 MM HG: CPT | Performed by: SURGERY

## 2023-07-10 PROCEDURE — 99213 OFFICE O/P EST LOW 20 MIN: CPT | Performed by: SURGERY

## 2023-07-10 PROCEDURE — 1123F ACP DISCUSS/DSCN MKR DOCD: CPT | Performed by: SURGERY

## 2023-07-10 PROCEDURE — 3075F SYST BP GE 130 - 139MM HG: CPT | Performed by: SURGERY

## 2023-07-11 ENCOUNTER — HOSPITAL ENCOUNTER (OUTPATIENT)
Dept: WOMENS IMAGING | Age: 63
Discharge: HOME | End: 2023-07-13
Payer: COMMERCIAL

## 2023-07-11 DIAGNOSIS — Z12.31 BREAST CANCER SCREENING BY MAMMOGRAM: ICD-10-CM

## 2023-07-11 PROCEDURE — 77063 BREAST TOMOSYNTHESIS BI: CPT

## 2023-07-17 RX ORDER — GABAPENTIN 100 MG/1
CAPSULE ORAL
Qty: 270 CAPSULE | Refills: 1 | OUTPATIENT
Start: 2023-07-17

## 2023-07-17 RX ORDER — GABAPENTIN 100 MG/1
100 CAPSULE ORAL 3 TIMES DAILY
Qty: 270 CAPSULE | Refills: 1 | OUTPATIENT
Start: 2023-07-17 | End: 2024-01-13

## 2023-07-20 ENCOUNTER — TELEPHONE (OUTPATIENT)
Dept: VASCULAR SURGERY | Age: 63
End: 2023-07-20

## 2023-07-20 RX ORDER — GABAPENTIN 100 MG/1
CAPSULE ORAL
Qty: 270 CAPSULE | Refills: 1 | OUTPATIENT
Start: 2023-07-20

## 2023-07-20 NOTE — TELEPHONE ENCOUNTER
Patient called into the office and was wanting to know if Dr. Jadyn Langley was going to refill her gabapentin. I informed the patient that Dr. Jadyn Langley replied to the request and states he is not going to refill this medication. I informed the patient to contact her PCP if she wants to continue this medication. Patient states she understands.

## 2023-09-22 ENCOUNTER — TRANSCRIBE ORDERS (OUTPATIENT)
Dept: ADMINISTRATIVE | Age: 63
End: 2023-09-22

## 2023-09-22 DIAGNOSIS — I65.23 BILATERAL CAROTID ARTERY STENOSIS: Primary | ICD-10-CM

## 2023-09-22 DIAGNOSIS — I71.43 ANEURYSM OF INFRARENAL ABDOMINAL AORTA, UNSPECIFIED WHETHER RUPTURED (HCC): ICD-10-CM

## 2023-09-22 DIAGNOSIS — I70.213 ATHEROSCLEROSIS OF NATIVE ARTERY OF BOTH LOWER EXTREMITIES WITH INTERMITTENT CLAUDICATION (HCC): ICD-10-CM

## 2023-10-09 ENCOUNTER — HOSPITAL ENCOUNTER (OUTPATIENT)
Dept: VASCULAR LAB | Age: 63
Discharge: HOME | End: 2023-10-11
Attending: SURGERY
Payer: COMMERCIAL

## 2023-10-09 DIAGNOSIS — I71.43 INFRARENAL ABDOMINAL AORTIC ANEURYSM (AAA) WITHOUT RUPTURE (HCC): ICD-10-CM

## 2023-10-09 DIAGNOSIS — I65.23 BILATERAL CAROTID ARTERY STENOSIS: ICD-10-CM

## 2023-10-09 DIAGNOSIS — I71.43 ANEURYSM OF INFRARENAL ABDOMINAL AORTA, UNSPECIFIED WHETHER RUPTURED (HCC): ICD-10-CM

## 2023-10-09 DIAGNOSIS — I70.213 ATHEROSCLEROSIS OF NATIVE ARTERY OF BOTH LOWER EXTREMITIES WITH INTERMITTENT CLAUDICATION (HCC): ICD-10-CM

## 2023-10-09 LAB
VAS AORTA MID AP: 2.8 CM
VAS AORTA MID PSV: 40.1 CM/S
VAS AORTA MID TRANS: 2.8 CM
VAS AORTA PROX AP: 2.9 CM
VAS AORTA PROX PSV: 64.2 CM/S
VAS AORTA PROX TR: 2.9 CM
VAS EVAR LEFT LIMB PROX PSV: 0 CM/S
VAS EVAR RIGHT LIMB DIST PSV: 235 CM/S
VAS EVAR RIGHT LIMB PROX PSV: 310 CM/S
VAS EVAR SUP ATTACH PSV: 2.1 CM/S
VAS EVAR SUPERIOR ATTACH SITE: NORMAL
VAS LEFT ABI: 0.83
VAS LEFT ARM BP: 145 MMHG
VAS LEFT ATA DIST PSV: 40 CM/S
VAS LEFT BULB EDV: 74.5 CM/S
VAS LEFT BULB PSV: 298 CM/S
VAS LEFT CCA DIST EDV: 21.2 CM/S
VAS LEFT CCA DIST PSV: 100 CM/S
VAS LEFT CCA MID EDV: 23.5 CM/S
VAS LEFT CCA MID PSV: 104 CM/S
VAS LEFT CCA PROX EDV: 18.8 CM/S
VAS LEFT CCA PROX PSV: 119 CM/S
VAS LEFT DORSALIS PEDIS BP: 121 MMHG
VAS LEFT ECA EDV: 37.6 CM/S
VAS LEFT ECA PSV: 224 CM/S
VAS LEFT ICA DIST EDV: 27.4 CM/S
VAS LEFT ICA DIST PSV: 128 CM/S
VAS LEFT ICA MID EDV: 31.8 CM/S
VAS LEFT ICA MID PSV: 121 CM/S
VAS LEFT ICA PROX EDV: 45.5 CM/S
VAS LEFT ICA PROX PSV: 204 CM/S
VAS LEFT ICA/CCA PSV: 2.04
VAS LEFT PERONEAL DIST PSV: 25 CM/S
VAS LEFT PFA PROX PSV: 29 CM/S
VAS LEFT POP A DIST PSV: 54 CM/S
VAS LEFT POP A PROX PSV: 102 CM/S
VAS LEFT POP A PROX VEL RATIO: 0.99
VAS LEFT PTA BP: 120 MMHG
VAS LEFT PTA DIST PSV: 38 CM/S
VAS LEFT SFA DIST PSV: 103 CM/S
VAS LEFT SFA DIST VEL RATIO: 0.81
VAS LEFT SFA MID PSV: 127 CM/S
VAS LEFT SFA MID VEL RATIO: 1.48
VAS LEFT SFA PROX PSV: 86 CM/S
VAS LEFT TBI: 0.5
VAS LEFT TOE PRESSURE: 72 MMHG
VAS LEFT VERTEBRAL EDV: 10.5 CM/S
VAS LEFT VERTEBRAL PSV: 42.4 CM/S
VAS RIGHT ABI: 0.88
VAS RIGHT ARM BP: 137 MMHG
VAS RIGHT ARTERIAL PROX ANASTOMOSIS EDV: 27 CM/S
VAS RIGHT ARTERIAL PROX ANASTOMOSIS PSV: 202 CM/S
VAS RIGHT ATA DIST PSV: 57 CM/S
VAS RIGHT BULB EDV: 14.9 CM/S
VAS RIGHT BULB PSV: 71.4 CM/S
VAS RIGHT CCA DIST EDV: 19.9 CM/S
VAS RIGHT CCA DIST PSV: 97.5 CM/S
VAS RIGHT CCA MID EDV: 14.9 CM/S
VAS RIGHT CCA MID PSV: 122 CM/S
VAS RIGHT CCA PROX EDV: 13 CM/S
VAS RIGHT CCA PROX PSV: 107 CM/S
VAS RIGHT CFA DIST PSV: 129 CM/S
VAS RIGHT COM ILIAC AP: 0.82 CM
VAS RIGHT COM ILIAC PROX PSV: 144 CM/S
VAS RIGHT COM ILIAC TRANS: 0.82 CM
VAS RIGHT DIST OUTFLOW EDV: 15 CM/S
VAS RIGHT DIST OUTFLOW PSV: 56 CM/S
VAS RIGHT DORSALIS PEDIS BP: 82 MMHG
VAS RIGHT ECA EDV: 13.7 CM/S
VAS RIGHT ECA PSV: 93.2 CM/S
VAS RIGHT GRAFT 1: NORMAL
VAS RIGHT ICA DIST EDV: 23.6 CM/S
VAS RIGHT ICA DIST PSV: 103 CM/S
VAS RIGHT ICA MID EDV: 22.4 CM/S
VAS RIGHT ICA MID PSV: 88.8 CM/S
VAS RIGHT ICA PROX EDV: 19.9 CM/S
VAS RIGHT ICA PROX PSV: 86.4 CM/S
VAS RIGHT ICA/CCA PSV: 1.06
VAS RIGHT INFLOW ARTERY EDV: 0 CM/S
VAS RIGHT INFLOW ARTERY PSV: 119 CM/S
VAS RIGHT MID OUTFLOW EDV: 13 CM/S
VAS RIGHT MID OUTFLOW PSV: 58 CM/S
VAS RIGHT OUTFLOW VESSEL EDV: 0 CM/S
VAS RIGHT OUTFLOW VESSEL PSV: 62 CM/S
VAS RIGHT PERONEAL DIST PSV: 43 CM/S
VAS RIGHT PFA PROX PSV: 84 CM/S
VAS RIGHT POP A DIST PSV: 51 CM/S
VAS RIGHT POP A PROX PSV: 85 CM/S
VAS RIGHT POP A PROX VEL RATIO: 0.75
VAS RIGHT PROX OUTFLOW EDV: 18 CM/S
VAS RIGHT PROX OUTFLOW PSV: 97 CM/S
VAS RIGHT PTA BP: 128 MMHG
VAS RIGHT PTA DIST PSV: 18 CM/S
VAS RIGHT SFA DIST PSV: 114 CM/S
VAS RIGHT SFA DIST VEL RATIO: 1.21
VAS RIGHT SFA MID PSV: 94 CM/S
VAS RIGHT SFA MID VEL RATIO: 0.9
VAS RIGHT SFA PROX PSV: 100 CM/S
VAS RIGHT SFA PROX VEL RATIO: 0.8
VAS RIGHT TBI: 0.34
VAS RIGHT TOE PRESSURE: 50 MMHG
VAS RIGHT VENOUS DIST ANASTOMOSIS EDV: 0 CM/S
VAS RIGHT VENOUS DIST ANASTOMOSIS PSV: 91 CM/S
VAS RIGHT VERTEBRAL EDV: 7.22 CM/S
VAS RIGHT VERTEBRAL PSV: 44.2 CM/S

## 2023-10-09 PROCEDURE — 93924 LWR XTR VASC STDY BILAT: CPT

## 2023-10-09 PROCEDURE — 76706 US ABDL AORTA SCREEN AAA: CPT | Performed by: SURGERY

## 2023-10-09 PROCEDURE — 93924 LWR XTR VASC STDY BILAT: CPT | Performed by: SURGERY

## 2023-10-09 PROCEDURE — 93976 VASCULAR STUDY: CPT

## 2023-10-09 PROCEDURE — 93880 EXTRACRANIAL BILAT STUDY: CPT

## 2023-10-09 PROCEDURE — 93880 EXTRACRANIAL BILAT STUDY: CPT | Performed by: SURGERY

## 2023-10-09 PROCEDURE — 93976 VASCULAR STUDY: CPT | Performed by: SURGERY

## 2023-10-09 PROCEDURE — 93925 LOWER EXTREMITY STUDY: CPT | Performed by: SURGERY

## 2023-10-09 PROCEDURE — 93925 LOWER EXTREMITY STUDY: CPT

## 2023-10-09 PROCEDURE — 76706 US ABDL AORTA SCREEN AAA: CPT

## 2023-10-16 ENCOUNTER — OFFICE VISIT (OUTPATIENT)
Dept: VASCULAR SURGERY | Age: 63
End: 2023-10-16
Payer: MEDICARE

## 2023-10-16 VITALS
RESPIRATION RATE: 18 BRPM | WEIGHT: 163 LBS | OXYGEN SATURATION: 88 % | BODY MASS INDEX: 26.31 KG/M2 | HEART RATE: 82 BPM | SYSTOLIC BLOOD PRESSURE: 150 MMHG | DIASTOLIC BLOOD PRESSURE: 78 MMHG | TEMPERATURE: 98 F

## 2023-10-16 DIAGNOSIS — I70.213 ATHEROSCLER OF NATIVE ARTERY OF BOTH LEGS WITH INTERMIT CLAUDICATION (HCC): Primary | ICD-10-CM

## 2023-10-16 PROBLEM — E11.22 TYPE 2 DIABETES MELLITUS WITH CHRONIC KIDNEY DISEASE (HCC): Status: ACTIVE | Noted: 2023-10-16

## 2023-10-16 PROCEDURE — 3078F DIAST BP <80 MM HG: CPT | Performed by: SURGERY

## 2023-10-16 PROCEDURE — 1123F ACP DISCUSS/DSCN MKR DOCD: CPT | Performed by: SURGERY

## 2023-10-16 PROCEDURE — 3074F SYST BP LT 130 MM HG: CPT | Performed by: SURGERY

## 2023-10-16 PROCEDURE — 99213 OFFICE O/P EST LOW 20 MIN: CPT | Performed by: SURGERY

## 2023-10-25 ENCOUNTER — TELEPHONE (OUTPATIENT)
Dept: ONCOLOGY | Age: 63
End: 2023-10-25

## 2023-10-25 DIAGNOSIS — R91.8 ABNORMAL CT LUNG SCREENING: Primary | ICD-10-CM

## 2023-10-25 NOTE — TELEPHONE ENCOUNTER
Our records indicate that your patient is coming due for their recommended 6 month follow up testing from  lung cancer screening. For your convenience, we have pended the order for the scan for you. If you do not agree with the need for the test, please cancel the order and let us know.      Sincerely,    [address]

## 2023-11-22 ENCOUNTER — HOSPITAL ENCOUNTER (OUTPATIENT)
Age: 63
Discharge: HOME | End: 2023-11-22
Payer: COMMERCIAL

## 2023-11-22 DIAGNOSIS — E13.22 SECONDARY DIABETES MELLITUS WITH STAGE 3 CHRONIC KIDNEY DISEASE (HCC): ICD-10-CM

## 2023-11-22 DIAGNOSIS — N20.0 KIDNEY STONE: ICD-10-CM

## 2023-11-22 DIAGNOSIS — N18.30 BENIGN HYPERTENSION WITH CKD (CHRONIC KIDNEY DISEASE) STAGE III (HCC): ICD-10-CM

## 2023-11-22 DIAGNOSIS — N18.30 SECONDARY DIABETES MELLITUS WITH STAGE 3 CHRONIC KIDNEY DISEASE (HCC): ICD-10-CM

## 2023-11-22 DIAGNOSIS — R80.9 PROTEINURIA, UNSPECIFIED TYPE: ICD-10-CM

## 2023-11-22 DIAGNOSIS — I12.9 BENIGN HYPERTENSION WITH CKD (CHRONIC KIDNEY DISEASE) STAGE III (HCC): ICD-10-CM

## 2023-11-22 DIAGNOSIS — N18.31 STAGE 3A CHRONIC KIDNEY DISEASE (HCC): ICD-10-CM

## 2023-11-22 LAB
ANION GAP SERPL CALCULATED.3IONS-SCNC: 11 MMOL/L (ref 9–17)
BACTERIA URNS QL MICRO: ABNORMAL
BASOPHILS # BLD: 0.1 K/UL (ref 0–0.2)
BASOPHILS NFR BLD: 1 % (ref 0–2)
BILIRUB UR QL STRIP: NEGATIVE
BUN SERPL-MCNC: 22 MG/DL (ref 8–23)
CALCIUM SERPL-MCNC: 9.2 MG/DL (ref 8.6–10.4)
CASTS #/AREA URNS LPF: ABNORMAL /LPF
CHLORIDE SERPL-SCNC: 98 MMOL/L (ref 98–107)
CLARITY UR: CLEAR
CO2 SERPL-SCNC: 24 MMOL/L (ref 20–31)
COLOR UR: YELLOW
CREAT SERPL-MCNC: 1.2 MG/DL (ref 0.5–0.9)
EOSINOPHIL # BLD: 0.3 K/UL (ref 0–0.4)
EOSINOPHILS RELATIVE PERCENT: 3 % (ref 0–4)
EPI CELLS #/AREA URNS HPF: ABNORMAL /HPF
ERYTHROCYTE [DISTWIDTH] IN BLOOD BY AUTOMATED COUNT: 14.9 % (ref 11.5–14.9)
GFR SERPL CREATININE-BSD FRML MDRD: 50 ML/MIN/1.73M2
GLUCOSE SERPL-MCNC: 109 MG/DL (ref 70–99)
GLUCOSE UR STRIP-MCNC: ABNORMAL MG/DL
HCT VFR BLD AUTO: 41.3 % (ref 36–46)
HGB BLD-MCNC: 13.4 G/DL (ref 12–16)
HGB UR QL STRIP.AUTO: NEGATIVE
KETONES UR STRIP-MCNC: NEGATIVE MG/DL
LEUKOCYTE ESTERASE UR QL STRIP: ABNORMAL
LYMPHOCYTES NFR BLD: 2.7 K/UL (ref 1–4.8)
LYMPHOCYTES RELATIVE PERCENT: 24 % (ref 24–44)
MAGNESIUM SERPL-MCNC: 2.4 MG/DL (ref 1.6–2.6)
MCH RBC QN AUTO: 30.1 PG (ref 26–34)
MCHC RBC AUTO-ENTMCNC: 32.4 G/DL (ref 31–37)
MCV RBC AUTO: 92.9 FL (ref 80–100)
MONOCYTES NFR BLD: 0.7 K/UL (ref 0.1–1.3)
MONOCYTES NFR BLD: 6 % (ref 1–7)
NEUTROPHILS NFR BLD: 66 % (ref 36–66)
NEUTS SEG NFR BLD: 7.6 K/UL (ref 1.3–9.1)
NITRITE UR QL STRIP: NEGATIVE
PH UR STRIP: 5.5 [PH] (ref 5–8)
PHOSPHATE SERPL-MCNC: 3.9 MG/DL (ref 2.6–4.5)
PLATELET # BLD AUTO: 220 K/UL (ref 150–450)
PMV BLD AUTO: 8.2 FL (ref 6–12)
POTASSIUM SERPL-SCNC: 4.7 MMOL/L (ref 3.7–5.3)
PROT UR STRIP-MCNC: NEGATIVE MG/DL
RBC # BLD AUTO: 4.44 M/UL (ref 4–5.2)
RBC #/AREA URNS HPF: ABNORMAL /HPF
SODIUM SERPL-SCNC: 133 MMOL/L (ref 135–144)
SP GR UR STRIP: 1.01 (ref 1–1.03)
UROBILINOGEN UR STRIP-ACNC: NORMAL EU/DL (ref 0–1)
WBC #/AREA URNS HPF: ABNORMAL /HPF
WBC OTHER # BLD: 11.5 K/UL (ref 3.5–11)

## 2023-11-22 PROCEDURE — 81001 URINALYSIS AUTO W/SCOPE: CPT

## 2023-11-22 PROCEDURE — 36415 COLL VENOUS BLD VENIPUNCTURE: CPT

## 2023-11-22 PROCEDURE — 84100 ASSAY OF PHOSPHORUS: CPT

## 2023-11-22 PROCEDURE — 85025 COMPLETE CBC W/AUTO DIFF WBC: CPT

## 2023-11-22 PROCEDURE — 80048 BASIC METABOLIC PNL TOTAL CA: CPT

## 2023-11-22 PROCEDURE — 83735 ASSAY OF MAGNESIUM: CPT

## 2023-11-29 PROBLEM — G45.9 TRANSIENT ISCHEMIC ATTACK: Status: ACTIVE | Noted: 2017-07-03

## 2023-12-08 ENCOUNTER — HOSPITAL ENCOUNTER (OUTPATIENT)
Dept: CT IMAGING | Age: 63
End: 2023-12-08
Payer: COMMERCIAL

## 2023-12-08 DIAGNOSIS — R91.8 ABNORMAL CT LUNG SCREENING: ICD-10-CM

## 2023-12-08 PROCEDURE — 71250 CT THORAX DX C-: CPT

## 2024-03-21 ENCOUNTER — HOSPITAL ENCOUNTER (OUTPATIENT)
Dept: VASCULAR LAB | Age: 64
Discharge: HOME | End: 2024-03-23
Attending: SURGERY
Payer: COMMERCIAL

## 2024-03-21 ENCOUNTER — HOSPITAL ENCOUNTER (OUTPATIENT)
Age: 64
Discharge: HOME | End: 2024-03-23

## 2024-03-21 ENCOUNTER — HOSPITAL ENCOUNTER (OUTPATIENT)
Dept: GENERAL RADIOLOGY | Age: 64
Discharge: HOME | End: 2024-03-23
Attending: SURGERY
Payer: COMMERCIAL

## 2024-03-21 DIAGNOSIS — I70.213 ATHEROSCLER OF NATIVE ARTERY OF BOTH LEGS WITH INTERMIT CLAUDICATION (HCC): ICD-10-CM

## 2024-03-21 DIAGNOSIS — M25.511 ACUTE PAIN OF RIGHT SHOULDER: ICD-10-CM

## 2024-03-21 LAB
VAS LEFT ABI: 0.5
VAS LEFT ARM BP: 174 MMHG
VAS LEFT DORSALIS PEDIS BP: 81 MMHG
VAS LEFT PTA BP: 87 MMHG
VAS LEFT SFA MID PSV: 94.1 CM/S
VAS LEFT SFA MID VEL RATIO: 1.22
VAS LEFT SFA PROX PSV: 77.3 CM/S
VAS LEFT TBI: 0.47
VAS LEFT TOE PRESSURE: 82 MMHG
VAS RIGHT ABI: 0.68
VAS RIGHT ARM BP: 171 MMHG
VAS RIGHT ARTERIAL PROX ANASTOMOSIS EDV: 18.8 CM/S
VAS RIGHT ARTERIAL PROX ANASTOMOSIS PSV: 130.4 CM/S
VAS RIGHT CFA PROX PSV: 240.1 CM/S
VAS RIGHT DIST OUTFLOW EDV: 20.2 CM/S
VAS RIGHT DIST OUTFLOW PSV: 73.4 CM/S
VAS RIGHT DORSALIS PEDIS BP: 119 MMHG
VAS RIGHT GRAFT 1: NORMAL
VAS RIGHT INFLOW ARTERY EDV: 20 CM/S
VAS RIGHT INFLOW ARTERY PSV: 176.1 CM/S
VAS RIGHT MID OUTFLOW EDV: 21.5 CM/S
VAS RIGHT MID OUTFLOW PSV: 82.4 CM/S
VAS RIGHT OUTFLOW VESSEL EDV: 0 CM/S
VAS RIGHT OUTFLOW VESSEL PSV: 77.3 CM/S
VAS RIGHT PFA PROX PSV: 80.2 CM/S
VAS RIGHT PROX OUTFLOW EDV: 16.1 CM/S
VAS RIGHT PROX OUTFLOW PSV: 61.2 CM/S
VAS RIGHT PTA BP: 92 MMHG
VAS RIGHT SFA MID PSV: 78 CM/S
VAS RIGHT SFA MID VEL RATIO: 0.4
VAS RIGHT SFA PROX PSV: 176.1 CM/S
VAS RIGHT SFA PROX VEL RATIO: 0.7
VAS RIGHT TBI: 0.4
VAS RIGHT TOE PRESSURE: 70 MMHG
VAS RIGHT VENOUS DIST ANASTOMOSIS EDV: 21.5 CM/S
VAS RIGHT VENOUS DIST ANASTOMOSIS PSV: 87.6 CM/S

## 2024-03-21 PROCEDURE — 93925 LOWER EXTREMITY STUDY: CPT

## 2024-03-21 PROCEDURE — 73030 X-RAY EXAM OF SHOULDER: CPT

## 2024-03-21 PROCEDURE — 93923 UPR/LXTR ART STDY 3+ LVLS: CPT

## 2024-03-22 SDOH — HEALTH STABILITY: PHYSICAL HEALTH: ON AVERAGE, HOW MANY DAYS PER WEEK DO YOU ENGAGE IN MODERATE TO STRENUOUS EXERCISE (LIKE A BRISK WALK)?: 0 DAYS

## 2024-03-22 SDOH — HEALTH STABILITY: PHYSICAL HEALTH: ON AVERAGE, HOW MANY MINUTES DO YOU ENGAGE IN EXERCISE AT THIS LEVEL?: 20 MIN

## 2024-03-25 ENCOUNTER — OFFICE VISIT (OUTPATIENT)
Dept: ORTHOPEDIC SURGERY | Age: 64
End: 2024-03-25
Payer: COMMERCIAL

## 2024-03-25 VITALS — HEIGHT: 66 IN | BODY MASS INDEX: 26.2 KG/M2 | WEIGHT: 163 LBS

## 2024-03-25 DIAGNOSIS — M25.511 RIGHT SHOULDER PAIN, UNSPECIFIED CHRONICITY: Primary | ICD-10-CM

## 2024-03-25 PROCEDURE — 99203 OFFICE O/P NEW LOW 30 MIN: CPT | Performed by: ORTHOPAEDIC SURGERY

## 2024-03-25 PROCEDURE — 1123F ACP DISCUSS/DSCN MKR DOCD: CPT | Performed by: ORTHOPAEDIC SURGERY

## 2024-03-25 NOTE — PROGRESS NOTES
provided.  She was encouraged to return or call at anytime with persistent or worsening pain with any questions or concerns.    This note is created with the assistance of a speech recognition program.  While intending to generate adocument that actually reflects the content of the visit, the document can still have some errors including those of syntax and sound a like substitutions which may escape proof reading.  It such instances, actual meaningcan be extrapolated by contextual diversion.    NA = Not assessed  RTC = Rotator cuff  RCT = Rotator cuff tear  ER = External rotation  IR = Internal rotation  AC = Acromioclavicular  GH = Glenohumeral  n = No  y = Yes

## 2024-04-17 PROBLEM — N18.30 SECONDARY DIABETES MELLITUS WITH STAGE 3 CHRONIC KIDNEY DISEASE (HCC): Status: RESOLVED | Noted: 2019-03-12 | Resolved: 2024-04-17

## 2024-04-17 PROBLEM — E13.22 SECONDARY DIABETES MELLITUS WITH STAGE 3 CHRONIC KIDNEY DISEASE (HCC): Status: RESOLVED | Noted: 2019-03-12 | Resolved: 2024-04-17

## 2024-04-17 PROBLEM — J43.9 PULMONARY EMPHYSEMA, UNSPECIFIED EMPHYSEMA TYPE (HCC): Status: ACTIVE | Noted: 2024-04-17

## 2024-04-22 ENCOUNTER — OFFICE VISIT (OUTPATIENT)
Dept: VASCULAR SURGERY | Age: 64
End: 2024-04-22
Payer: COMMERCIAL

## 2024-04-22 VITALS
WEIGHT: 157 LBS | RESPIRATION RATE: 16 BRPM | HEART RATE: 96 BPM | SYSTOLIC BLOOD PRESSURE: 142 MMHG | DIASTOLIC BLOOD PRESSURE: 76 MMHG | OXYGEN SATURATION: 93 % | HEIGHT: 65 IN | BODY MASS INDEX: 26.16 KG/M2

## 2024-04-22 DIAGNOSIS — I70.213 ATHEROSCLER OF NATIVE ARTERY OF BOTH LEGS WITH INTERMIT CLAUDICATION (HCC): ICD-10-CM

## 2024-04-22 DIAGNOSIS — I70.213 ATHEROSCLEROSIS OF NATIVE ARTERY OF BOTH LOWER EXTREMITIES WITH INTERMITTENT CLAUDICATION (HCC): Primary | ICD-10-CM

## 2024-04-22 PROCEDURE — 1123F ACP DISCUSS/DSCN MKR DOCD: CPT | Performed by: SURGERY

## 2024-04-22 PROCEDURE — 3077F SYST BP >= 140 MM HG: CPT | Performed by: SURGERY

## 2024-04-22 PROCEDURE — 3078F DIAST BP <80 MM HG: CPT | Performed by: SURGERY

## 2024-04-22 PROCEDURE — 99213 OFFICE O/P EST LOW 20 MIN: CPT | Performed by: SURGERY

## 2024-04-22 NOTE — PROGRESS NOTES
[facility]  [address] [facility] [address]  Dept: [phone]     Patient: Annalisa Sams  : 1960  MRN: [id number]  DOS: 2024            HPI:  Annalisa Sams is a 63 y.o. female who returns to the office regarding her femorofemoral bypass.  Recall that she has an aortic endograft in place which is a bifurcated device.  Her left iliac limb went down requiring femorofemoral bypass.  This then required revision by me in 2022 with resection of the london of the femorofemoral bypass graft with endarterectomy and then replacement of that london with another piece of interposition graft.  This produced an ALEXANDRA bilaterally of approximately 0.8.  Interestingly her ALEXANDRA now is down to 0.5 on the right and 0.4 on the left.  This is a significant change from 6 months ago.  She continues to smoke cigarettes despite warnings to the contrary.  She does not complain of worsening claudication and has no rest pain in either lower extremity.  She has no ulcerations.    Review of Systems    Vitals:    24 1102   BP: (!) 142/76   Site: Right Upper Arm   Position: Sitting   Cuff Size: Medium Adult   Pulse: 96   Resp: 16   SpO2: 93%   Weight: 71.2 kg (157 lb)   Height: 1.651 m (5' 5\")          Physical Exam  Her examination really has not changed.  I can still not palpate distal pulses at the dorsalis pedis or posterior tibials on either side.  She has a relatively strong femoral pulse on the right with a weaker femoral pulse on the left.  I cannot palpate popliteal pulses on either side.  There is no ulceration.  She has no significant dependent rubor.  Assessment:  1. Atherosclerosis of native artery of both lower extremities with intermittent claudication (HCC)    2. Atheroscler of native artery of both legs with intermit claudication (HCC)          Plan:  At this point given her significant change in the ALEXANDRA I would want to

## 2024-04-23 ENCOUNTER — HOSPITAL ENCOUNTER (OUTPATIENT)
Dept: PHYSICAL THERAPY | Age: 64
Setting detail: THERAPIES SERIES
Discharge: HOME | End: 2024-04-23
Payer: COMMERCIAL

## 2024-04-23 PROCEDURE — 97162 PT EVAL MOD COMPLEX 30 MIN: CPT

## 2024-05-04 ENCOUNTER — HOSPITAL ENCOUNTER (OUTPATIENT)
Dept: CT IMAGING | Age: 64
End: 2024-05-04
Attending: SURGERY
Payer: COMMERCIAL

## 2024-05-04 DIAGNOSIS — I70.213 ATHEROSCLEROSIS OF NATIVE ARTERY OF BOTH LOWER EXTREMITIES WITH INTERMITTENT CLAUDICATION (HCC): ICD-10-CM

## 2024-05-04 DIAGNOSIS — I70.213 ATHEROSCLER OF NATIVE ARTERY OF BOTH LEGS WITH INTERMIT CLAUDICATION (HCC): ICD-10-CM

## 2024-05-04 LAB
EGFR, POC: 56 ML/MIN/1.73M2
POC CREATININE: 1.1 MG/DL (ref 0.51–1.19)

## 2024-05-04 PROCEDURE — 75635 CT ANGIO ABDOMINAL ARTERIES: CPT

## 2024-05-04 PROCEDURE — 2580000003 HC RX 258: Performed by: SURGERY

## 2024-05-04 PROCEDURE — 82565 ASSAY OF CREATININE: CPT

## 2024-05-04 PROCEDURE — 6360000004 HC RX CONTRAST MEDICATION: Performed by: SURGERY

## 2024-05-04 RX ORDER — 0.9 % SODIUM CHLORIDE 0.9 %
100 INTRAVENOUS SOLUTION INTRAVENOUS ONCE
Status: COMPLETED | OUTPATIENT
Start: 2024-05-04 | End: 2024-05-04

## 2024-05-04 RX ORDER — SODIUM CHLORIDE 0.9 % (FLUSH) 0.9 %
10 SYRINGE (ML) INJECTION PRN
Status: DISCONTINUED | OUTPATIENT
Start: 2024-05-04 | End: 2024-05-07 | Stop reason: HOSPADM

## 2024-05-04 RX ADMIN — SODIUM CHLORIDE 100 ML: 9 INJECTION, SOLUTION INTRAVENOUS at 11:11

## 2024-05-04 RX ADMIN — IOPAMIDOL 100 ML: 755 INJECTION, SOLUTION INTRAVENOUS at 11:11

## 2024-05-04 RX ADMIN — SODIUM CHLORIDE, PRESERVATIVE FREE 10 ML: 5 INJECTION INTRAVENOUS at 11:11

## 2024-05-14 ENCOUNTER — HOSPITAL ENCOUNTER (OUTPATIENT)
Dept: PHYSICAL THERAPY | Age: 64
Setting detail: THERAPIES SERIES
Discharge: HOME | End: 2024-05-14
Payer: COMMERCIAL

## 2024-05-14 PROCEDURE — 97110 THERAPEUTIC EXERCISES: CPT

## 2024-05-14 NOTE — FLOWSHEET NOTE
[facility] Outpatient Physical Therapy   [address]   Phone: [phone]   Fax: [phone]    Physical Therapy Daily Treatment Note      Date:  2024  Patient Name:  Annalisa Sams    :  1960  MRN: [id number]  Physician: Kvng Henry MD                                Insurance: [facility] Approval valid 24-24   Medical Diagnosis:   M25.511 (ICD-10-CM) - Right shoulder pain, unspecified chronicity   Rehab Codes: M25.511 right shoulder pain, M25.611 Right shoulder stiffness   Onset Date: 2024                                 Next 's appt: None scheduled  Visit# / total visits:   Cancels/No Shows: 0/0    Precautions: Vertigo     Subjective:  Patient reporting to PT with no new complaints. States she is compliant with her HEP. Patient states heat is the only thing that helps- ice does not.     Pain:  [x] Yes  [] No Location: R shoulder Pain Rating: (0-10 scale) 4/10  Pain altered Tx:  [x] No  [] Yes  Action:  Comments:    Objective:  INTERVENTIONS  INTERVENTIONS  Reps/ Time Weight/ Level Completed  Today Comments          MODALITIES                      MANUAL                      EXERCISES        Supine:       Shoulder PROM 5'  x All directions   Shoulder AAROM 10x  x Dowel; flexion, ER/IR, abduction   Scap protraction 10x2  x With dowel                 Seated:       Pulleys 10x3'' ea  x Flex, abd, IR (standing)   Scap Retraction 10x2 3'' x    Table slides - Flexion              Standing:       Corner Pec stretch 3x20''  x    Wall slides  10x3'' ea  x    Shoulder isometrics 10x3'' ea  x Flexion, ER, abd, IR, extension                               Other:    Patient Education/Home Program :   : Access Code: [email or web address]  Exercises  - Seated Shoulder Flexion Towel Slide at Table Top Full Range of Motion  - 1 x daily - 7 x weekly - 1-3 sets - 10 reps  - Seated Scapular Retraction  - 1 x daily - 7 x weekly - 1-3 sets - 10 reps  - Single Arm Doorway

## 2024-05-17 ENCOUNTER — HOSPITAL ENCOUNTER (OUTPATIENT)
Dept: PHYSICAL THERAPY | Age: 64
Setting detail: THERAPIES SERIES
Discharge: HOME | End: 2024-05-17
Payer: COMMERCIAL

## 2024-05-17 PROCEDURE — 97140 MANUAL THERAPY 1/> REGIONS: CPT

## 2024-05-17 PROCEDURE — 97110 THERAPEUTIC EXERCISES: CPT

## 2024-05-17 NOTE — FLOWSHEET NOTE
[facility] Outpatient Physical Therapy   [address]   Phone: [phone]   Fax: [phone]    Physical Therapy Daily Treatment Note      Date:  2024  Patient Name:  Annalisa Sams    :  1960  MRN: [id number]  Physician: Kvng Henry MD                                Insurance: [facility] Approval valid 24-24   Medical Diagnosis:   M25.511 (ICD-10-CM) - Right shoulder pain, unspecified chronicity   Rehab Codes: M25.511 right shoulder pain, M25.611 Right shoulder stiffness   Onset Date: 2024                                 Next 's appt: None scheduled  Visit# / total visits: 3/12  Cancels/No Shows: 0/0    Precautions: Vertigo     Subjective: Patient reporting she was sore the day after last session and lasted for about a day.    Pain:  [x] Yes  [] No Location: R shoulder Pain Rating: (0-10 scale) 6/10  Pain altered Tx:  [x] No  [] Yes  Action:  Comments:    Objective:  INTERVENTIONS  INTERVENTIONS  Reps/ Time Weight/ Level Completed  Today Comments          MODALITIES                      MANUAL        TPR to subscapularis R side 4 mins  x To pt's tolerance   Scapular mobs inferior superior, ML 3 mins  x    Grade I-II PA and inferior mobilization 3 mins  x Pain noted with inferior mobs   EXERCISES        Supine:       Shoulder PROM 10x 3-5\"  x All directions   Shoulder AAROM 10x 3\" x Dowel; flexion, ER/IR, abduction   Scap protraction 10x2  x With dowel                 Seated:       Pulleys 10x3'' ea  x Flex, abd, IR (standing)   Scap Retraction 10x2 3'' x                  Standing:       Corner Pec stretch 3x20''  x    Wall slides  10x3'' ea  x    Shoulder isometrics 10x3'' ea  x Flexion, ER, abd, IR, extension                               Other:    Patient Education/Home Program :   : Access Code: [email or web address]  Exercises  - Seated Shoulder Flexion Towel Slide at Table Top Full Range of Motion  - 1 x daily - 7 x weekly - 1-3 sets - 10 reps  -

## 2024-05-20 ENCOUNTER — OFFICE VISIT (OUTPATIENT)
Dept: VASCULAR SURGERY | Age: 64
End: 2024-05-20
Payer: COMMERCIAL

## 2024-05-20 VITALS
RESPIRATION RATE: 16 BRPM | DIASTOLIC BLOOD PRESSURE: 70 MMHG | SYSTOLIC BLOOD PRESSURE: 167 MMHG | HEIGHT: 65 IN | WEIGHT: 157 LBS | BODY MASS INDEX: 26.16 KG/M2 | HEART RATE: 103 BPM | OXYGEN SATURATION: 91 %

## 2024-05-20 DIAGNOSIS — I70.213 ATHEROSCLER OF NATIVE ARTERY OF BOTH LEGS WITH INTERMIT CLAUDICATION (HCC): Primary | ICD-10-CM

## 2024-05-20 DIAGNOSIS — I71.41 PARARENAL ABDOMINAL AORTIC ANEURYSM (AAA) WITHOUT RUPTURE (HCC): ICD-10-CM

## 2024-05-20 PROCEDURE — 3078F DIAST BP <80 MM HG: CPT | Performed by: SURGERY

## 2024-05-20 PROCEDURE — 3077F SYST BP >= 140 MM HG: CPT | Performed by: SURGERY

## 2024-05-20 PROCEDURE — 1123F ACP DISCUSS/DSCN MKR DOCD: CPT | Performed by: SURGERY

## 2024-05-20 PROCEDURE — 99213 OFFICE O/P EST LOW 20 MIN: CPT | Performed by: SURGERY

## 2024-05-20 NOTE — PROGRESS NOTES
[facility], [facility] HEART AND VASCULAR  [address] [facility] [address]  Dept: [phone]     Patient: Annalisa Sams  : 1960  MRN: [id number]  DOS: 2024            HPI:  Annalisa Sams is a 63 y.o. female who returns to the office regarding a recent CTA.  Recall that she had a reduction in her ALEXANDRA bilaterally.  Both sides reduced significantly to approximately 0.4 from approximately 0.8 the year before.  She had no increase in her symptoms.  She still claudicate set longer distances.  Recall that we had performed revision of the right side of the femorofemoral bypass with repeat endarterectomy of the common femoral.  In fact the right and left anastomoses of the femorofemoral bypass are off of the proximal superficial femoral artery.  This was performed at an outside institution.  The common femoral had stenosis prior to my revision several years ago.  Given the reduction in her ALEXANDRA bilaterally I was thinking that there was either restenosis at the distal external iliac artery or somewhere along the line of the inflow.  The femorofemoral was done for an occluded limb of the endograft to the left lower extremity.  CT examination reveals no evidence of significant stenosis.  Interestingly what I think is going on is that the SFA is a 4 mm vessel and may not be allowing enough flow to either lower extremity.  There is no stenosis per se and I am not willing to change the configuration at this time.    Review of Systems    Vitals:    24 1047   BP: (!) 167/70   Site: Right Upper Arm   Position: Sitting   Cuff Size: Medium Adult   Pulse: (!) 103   Resp: 16   SpO2: 91%   Weight: 71.2 kg (157 lb)   Height: 1.651 m (5' 5\")          Physical Exam  The exam is not different than previous.  She continues to have no palpable distal pulses which is no change from previous.  She smells strongly of cigarette smoke.  She refuses to  Patient remains resting on cart, daughter remains present at bedside    Call light in reach  Will continue to monitor     Chelle Phan RN  02/20/22 8722

## 2024-05-21 ENCOUNTER — HOSPITAL ENCOUNTER (OUTPATIENT)
Dept: PHYSICAL THERAPY | Age: 64
Setting detail: THERAPIES SERIES
Discharge: HOME | End: 2024-05-21
Payer: COMMERCIAL

## 2024-05-21 PROCEDURE — 97140 MANUAL THERAPY 1/> REGIONS: CPT

## 2024-05-21 PROCEDURE — 97110 THERAPEUTIC EXERCISES: CPT

## 2024-05-21 NOTE — FLOWSHEET NOTE
away in overhead cabinet.         Patient goals: Decrease pain       Plan: [x] Continue per plan of care.   [] Other:      Treatment Charges: Mins Units   []  Modalities     [x]  Ther Exercise 35 2   [x]  Manual Therapy 10 1   []  Ther Activities     []  Aquatics     []  Neuromuscular     [] Vasocompression     [] Gait Training     [] Dry needling        [] 1 or 2 muscles        [] 3 or more muscles     []  Other     Total Billable timed codes 45 3     Time In: 1015           Time Out: 1100    Electronically signed by:  Michael Sanchez PTA

## 2024-05-23 ENCOUNTER — HOSPITAL ENCOUNTER (OUTPATIENT)
Age: 64
Setting detail: SPECIMEN
Discharge: HOME | End: 2024-05-23
Payer: COMMERCIAL

## 2024-05-23 ENCOUNTER — HOSPITAL ENCOUNTER (OUTPATIENT)
Age: 64
Discharge: HOME | End: 2024-05-23
Payer: COMMERCIAL

## 2024-05-23 DIAGNOSIS — E87.1 HYPONATREMIA: ICD-10-CM

## 2024-05-23 DIAGNOSIS — N18.31 STAGE 3A CHRONIC KIDNEY DISEASE (HCC): ICD-10-CM

## 2024-05-23 DIAGNOSIS — F33.41 RECURRENT MAJOR DEPRESSIVE DISORDER, IN PARTIAL REMISSION (HCC): ICD-10-CM

## 2024-05-23 DIAGNOSIS — N20.0 KIDNEY STONE: ICD-10-CM

## 2024-05-23 DIAGNOSIS — J43.9 PULMONARY EMPHYSEMA, UNSPECIFIED EMPHYSEMA TYPE (HCC): ICD-10-CM

## 2024-05-23 DIAGNOSIS — I10 ESSENTIAL HYPERTENSION: ICD-10-CM

## 2024-05-23 DIAGNOSIS — E78.5 HYPERLIPIDEMIA, UNSPECIFIED HYPERLIPIDEMIA TYPE: ICD-10-CM

## 2024-05-23 DIAGNOSIS — F17.210 CIGARETTE NICOTINE DEPENDENCE WITHOUT COMPLICATION: ICD-10-CM

## 2024-05-23 DIAGNOSIS — N18.30 SECONDARY DIABETES MELLITUS WITH STAGE 3 CHRONIC KIDNEY DISEASE (HCC): ICD-10-CM

## 2024-05-23 DIAGNOSIS — E55.9 VITAMIN D DEFICIENCY: ICD-10-CM

## 2024-05-23 DIAGNOSIS — N18.30 BENIGN HYPERTENSION WITH CKD (CHRONIC KIDNEY DISEASE) STAGE III (HCC): ICD-10-CM

## 2024-05-23 DIAGNOSIS — E13.22 SECONDARY DIABETES MELLITUS WITH STAGE 3 CHRONIC KIDNEY DISEASE (HCC): ICD-10-CM

## 2024-05-23 DIAGNOSIS — E11.22 TYPE 2 DIABETES MELLITUS WITH CHRONIC KIDNEY DISEASE, WITHOUT LONG-TERM CURRENT USE OF INSULIN, UNSPECIFIED CKD STAGE (HCC): ICD-10-CM

## 2024-05-23 DIAGNOSIS — D72.828 OTHER ELEVATED WHITE BLOOD CELL (WBC) COUNT: ICD-10-CM

## 2024-05-23 DIAGNOSIS — E66.3 OVERWEIGHT (BMI 25.0-29.9): ICD-10-CM

## 2024-05-23 DIAGNOSIS — I70.213 ATHEROSCLEROSIS OF NATIVE ARTERY OF BOTH LOWER EXTREMITIES WITH INTERMITTENT CLAUDICATION (HCC): ICD-10-CM

## 2024-05-23 DIAGNOSIS — I12.9 BENIGN HYPERTENSION WITH CKD (CHRONIC KIDNEY DISEASE) STAGE III (HCC): ICD-10-CM

## 2024-05-23 LAB
25(OH)D3 SERPL-MCNC: 47.5 NG/ML (ref 30–100)
ALBUMIN SERPL-MCNC: 3.9 G/DL (ref 3.5–5.2)
ALP SERPL-CCNC: 92 U/L (ref 35–104)
ALT SERPL-CCNC: 11 U/L (ref 5–33)
ANION GAP SERPL CALCULATED.3IONS-SCNC: 11 MMOL/L (ref 9–17)
ANION GAP SERPL CALCULATED.3IONS-SCNC: 11 MMOL/L (ref 9–17)
AST SERPL-CCNC: 15 U/L
BACTERIA URNS QL MICRO: ABNORMAL
BASOPHILS # BLD: 0.1 K/UL (ref 0–0.2)
BASOPHILS # BLD: 0.1 K/UL (ref 0–0.2)
BASOPHILS NFR BLD: 1 % (ref 0–2)
BASOPHILS NFR BLD: 1 % (ref 0–2)
BILIRUB SERPL-MCNC: 0.2 MG/DL (ref 0.3–1.2)
BILIRUB UR QL STRIP: NEGATIVE
BUN SERPL-MCNC: 20 MG/DL (ref 8–23)
BUN SERPL-MCNC: 20 MG/DL (ref 8–23)
CALCIUM SERPL-MCNC: 9.4 MG/DL (ref 8.6–10.4)
CALCIUM SERPL-MCNC: 9.4 MG/DL (ref 8.6–10.4)
CASTS #/AREA URNS LPF: ABNORMAL /LPF
CHLORIDE SERPL-SCNC: 105 MMOL/L (ref 98–107)
CHLORIDE SERPL-SCNC: 105 MMOL/L (ref 98–107)
CHOLEST SERPL-MCNC: 139 MG/DL
CHOLESTEROL/HDL RATIO: 4.8
CLARITY UR: ABNORMAL
CO2 SERPL-SCNC: 25 MMOL/L (ref 20–31)
CO2 SERPL-SCNC: 25 MMOL/L (ref 20–31)
COLOR UR: YELLOW
CREAT SERPL-MCNC: 1.3 MG/DL (ref 0.5–0.9)
CREAT SERPL-MCNC: 1.3 MG/DL (ref 0.5–0.9)
CREAT UR-MCNC: 70.6 MG/DL (ref 28–217)
EOSINOPHIL # BLD: 0.2 K/UL (ref 0–0.4)
EOSINOPHIL # BLD: 0.2 K/UL (ref 0–0.4)
EOSINOPHILS RELATIVE PERCENT: 3 % (ref 0–4)
EOSINOPHILS RELATIVE PERCENT: 3 % (ref 0–4)
EPI CELLS #/AREA URNS HPF: ABNORMAL /HPF
ERYTHROCYTE [DISTWIDTH] IN BLOOD BY AUTOMATED COUNT: 14.8 % (ref 11.5–14.9)
ERYTHROCYTE [DISTWIDTH] IN BLOOD BY AUTOMATED COUNT: 14.8 % (ref 11.5–14.9)
GFR, ESTIMATED: 46 ML/MIN/1.73M2
GFR, ESTIMATED: 46 ML/MIN/1.73M2
GLUCOSE SERPL-MCNC: 123 MG/DL (ref 70–99)
GLUCOSE SERPL-MCNC: 123 MG/DL (ref 70–99)
GLUCOSE UR STRIP-MCNC: ABNORMAL MG/DL
HCT VFR BLD AUTO: 42.4 % (ref 36–46)
HCT VFR BLD AUTO: 42.4 % (ref 36–46)
HDLC SERPL-MCNC: 29 MG/DL
HGB BLD-MCNC: 13.9 G/DL (ref 12–16)
HGB BLD-MCNC: 13.9 G/DL (ref 12–16)
HGB UR QL STRIP.AUTO: NEGATIVE
KETONES UR STRIP-MCNC: NEGATIVE MG/DL
LDLC SERPL CALC-MCNC: 66 MG/DL (ref 0–130)
LEUKOCYTE ESTERASE UR QL STRIP: ABNORMAL
LYMPHOCYTES NFR BLD: 2.5 K/UL (ref 1–4.8)
LYMPHOCYTES NFR BLD: 2.5 K/UL (ref 1–4.8)
LYMPHOCYTES RELATIVE PERCENT: 30 % (ref 24–44)
LYMPHOCYTES RELATIVE PERCENT: 30 % (ref 24–44)
MAGNESIUM SERPL-MCNC: 2.3 MG/DL (ref 1.6–2.6)
MCH RBC QN AUTO: 30.7 PG (ref 26–34)
MCH RBC QN AUTO: 30.7 PG (ref 26–34)
MCHC RBC AUTO-ENTMCNC: 32.8 G/DL (ref 31–37)
MCHC RBC AUTO-ENTMCNC: 32.8 G/DL (ref 31–37)
MCV RBC AUTO: 93.7 FL (ref 80–100)
MCV RBC AUTO: 93.7 FL (ref 80–100)
MICROALBUMIN UR-MCNC: 118 MG/L (ref 0–20)
MICROALBUMIN/CREAT UR-RTO: 167 MCG/MG CREAT (ref 0–25)
MONOCYTES NFR BLD: 0.6 K/UL (ref 0.1–1.3)
MONOCYTES NFR BLD: 0.6 K/UL (ref 0.1–1.3)
MONOCYTES NFR BLD: 8 % (ref 1–7)
MONOCYTES NFR BLD: 8 % (ref 1–7)
NEUTROPHILS NFR BLD: 58 % (ref 36–66)
NEUTROPHILS NFR BLD: 58 % (ref 36–66)
NEUTS SEG NFR BLD: 4.7 K/UL (ref 1.3–9.1)
NEUTS SEG NFR BLD: 4.7 K/UL (ref 1.3–9.1)
NITRITE UR QL STRIP: NEGATIVE
PH UR STRIP: 5.5 [PH] (ref 5–8)
PHOSPHATE SERPL-MCNC: 3.7 MG/DL (ref 2.6–4.5)
PLATELET # BLD AUTO: 197 K/UL (ref 150–450)
PLATELET # BLD AUTO: 197 K/UL (ref 150–450)
PMV BLD AUTO: 8.5 FL (ref 6–12)
PMV BLD AUTO: 8.5 FL (ref 6–12)
POTASSIUM SERPL-SCNC: 4.7 MMOL/L (ref 3.7–5.3)
POTASSIUM SERPL-SCNC: 4.7 MMOL/L (ref 3.7–5.3)
PROT SERPL-MCNC: 7.2 G/DL (ref 6.4–8.3)
PROT UR STRIP-MCNC: ABNORMAL MG/DL
RBC # BLD AUTO: 4.53 M/UL (ref 4–5.2)
RBC # BLD AUTO: 4.53 M/UL (ref 4–5.2)
RBC #/AREA URNS HPF: ABNORMAL /HPF
SODIUM SERPL-SCNC: 141 MMOL/L (ref 135–144)
SODIUM SERPL-SCNC: 141 MMOL/L (ref 135–144)
SP GR UR STRIP: 1.01 (ref 1–1.03)
TRIGL SERPL-MCNC: 218 MG/DL
UROBILINOGEN UR STRIP-ACNC: NORMAL EU/DL (ref 0–1)
WBC #/AREA URNS HPF: ABNORMAL /HPF
WBC OTHER # BLD: 8.2 K/UL (ref 3.5–11)
WBC OTHER # BLD: 8.2 K/UL (ref 3.5–11)

## 2024-05-23 PROCEDURE — 84100 ASSAY OF PHOSPHORUS: CPT

## 2024-05-23 PROCEDURE — 80061 LIPID PANEL: CPT

## 2024-05-23 PROCEDURE — 36415 COLL VENOUS BLD VENIPUNCTURE: CPT

## 2024-05-23 PROCEDURE — 82306 VITAMIN D 25 HYDROXY: CPT

## 2024-05-23 PROCEDURE — 80048 BASIC METABOLIC PNL TOTAL CA: CPT

## 2024-05-23 PROCEDURE — 85025 COMPLETE CBC W/AUTO DIFF WBC: CPT

## 2024-05-23 PROCEDURE — 80053 COMPREHEN METABOLIC PANEL: CPT

## 2024-05-23 PROCEDURE — 81001 URINALYSIS AUTO W/SCOPE: CPT

## 2024-05-23 PROCEDURE — 82570 ASSAY OF URINE CREATININE: CPT

## 2024-05-23 PROCEDURE — 82043 UR ALBUMIN QUANTITATIVE: CPT

## 2024-05-23 PROCEDURE — 83735 ASSAY OF MAGNESIUM: CPT

## 2024-05-24 ENCOUNTER — HOSPITAL ENCOUNTER (OUTPATIENT)
Dept: PHYSICAL THERAPY | Age: 64
Setting detail: THERAPIES SERIES
Discharge: HOME | End: 2024-05-24
Payer: COMMERCIAL

## 2024-05-24 PROCEDURE — 97140 MANUAL THERAPY 1/> REGIONS: CPT

## 2024-05-24 PROCEDURE — 97110 THERAPEUTIC EXERCISES: CPT

## 2024-05-24 NOTE — FLOWSHEET NOTE
[facility] Outpatient Physical Therapy   [address]   Phone: [phone]   Fax: [phone]    Physical Therapy Daily Treatment Note      Date:  2024  Patient Name:  Annalisa Sams    :  1960  MRN: [id number]  Physician: Kvng Henry MD                                Insurance: [facility] Approval valid 24-24   Medical Diagnosis:   M25.511 (ICD-10-CM) - Right shoulder pain, unspecified chronicity   Rehab Codes: M25.511 right shoulder pain, M25.611 Right shoulder stiffness   Onset Date: 2024                                 Next 's appt: None scheduled  Visit# / total visits:   Cancels/No Shows: 0/0    Precautions: Vertigo     Subjective: Patient reported generalized R shoulder soreness. States compliance with HEP daily. States her vertigo is more present today especially in standing.     Pain:  [x] Yes  [] No Location: R shoulder Pain Rating: (0-10 scale) 4/10  Pain altered Tx:  [x] No  [] Yes  Action:  Comments:    Objective:  INTERVENTIONS  INTERVENTIONS  Reps/ Time Weight/ Level Completed  Today Comments          MODALITIES                      MANUAL        TPR to subscapularis R side 4 mins  x To pt's tolerance   Scapular mobs inferior superior, ML 3 mins  x    Grade I-II PA and inferior mobilization 3 mins  x Pain noted with inferior mobs   EXERCISES        Supine:       Shoulder PROM 10x 3-5\"  x All directions   Shoulder AAROM 10x 3\" x Dowel; flexion, ER/IR, abduction   Scap protraction 10x2  x With dowel                 Seated:       Pulleys 10x3'' ea  x Flex, abd, IR (standing)   Scap Retraction 10x2 3'' x                  Standing:       Corner Pec stretch 3x20''  x    Wall slides  10x3'' ea  x    Shoulder isometrics 10x3'' ea  x Flexion, ER, abd, IR, extension                             24 1117   Pain Scale: How severe is your pain?   At its worst? 8   When lying on the involved side? 8   Reaching for something on a high 
4+/5 or better to improve ability with ability to put dishes away in overhead cabinet  ? Function: SPADI to 50% impairment or less to improve ability to perform ADLs and IADLs - 53.08%  Independent with Home Exercise Programs - states daily compliance      LTG: (to be met in 12 treatments)  Patient will report going throughout the week without increase in right shoulder pain to demonstrate improvement in activity tolerance  Patient will be able to lift 3# from waist without increase in right shoulder pain to overhead to simulate putting dishes away in overhead cabinet.         Patient goals: Decrease pain     Patient Education/Home Program :   4/23: Access Code: [email or web address]  Exercises  - Seated Shoulder Flexion Towel Slide at Table Top Full Range of Motion  - 1 x daily - 7 x weekly - 1-3 sets - 10 reps  - Seated Scapular Retraction  - 1 x daily - 7 x weekly - 1-3 sets - 10 reps  - Single Arm Doorway Pec Stretch at 60 Elevation  - 1 x daily - 7 x weekly - 3 sets - 10-15 hold     Pt. Education:  [x] Yes  [] No  [x] Reviewed Prior HEP/Ed  Method of Education: [x] Verbal  [x] Demo  [] Written  Comprehension of Education:  [] Verbalizes understanding.  [] Demonstrates understanding.  [] Needs review.  [x] Demonstrates/verbalizes HEP/Ed previously given.     Plan: [x] Continue per plan of care.   [] Other:      Treatment Charges: Mins Units   []  Modalities     [x]  Ther Exercise 30 2   [x]  Manual Therapy 10 1   []  Ther Activities     []  Aquatics     []  Neuromuscular     [] Vasocompression     [] Gait Training     [] Dry needling        [] 1 or 2 muscles        [] 3 or more muscles     []  Other     Total Billable timed codes 40 3     Time In: 1113 am          Time Out: 1157 am     Electronically signed by:  Gifty Kelly PTA

## 2024-05-31 ENCOUNTER — HOSPITAL ENCOUNTER (OUTPATIENT)
Dept: PHYSICAL THERAPY | Age: 64
Setting detail: THERAPIES SERIES
Discharge: HOME | End: 2024-05-31
Payer: COMMERCIAL

## 2024-05-31 PROCEDURE — 97110 THERAPEUTIC EXERCISES: CPT

## 2024-05-31 PROCEDURE — 97140 MANUAL THERAPY 1/> REGIONS: CPT

## 2024-05-31 NOTE — FLOWSHEET NOTE
[facility] Outpatient Physical Therapy   [address]   Phone: [phone]   Fax: [phone]    Physical Therapy Daily Treatment       Date:  2024  Patient Name:  Annalisa Sams    :  1960  MRN: [id number]  Physician: Kvng Henry MD                                Insurance: [facility] Approval valid 24-24   Medical Diagnosis:   M25.511 (ICD-10-CM) - Right shoulder pain, unspecified chronicity   Rehab Codes: M25.511 right shoulder pain, M25.611 Right shoulder stiffness   Onset Date: 2024                                 Next 's appt: None scheduled  Visit# / total visits:   Cancels/No Shows: 0/0    Precautions: Vertigo     Subjective: Patient demonstrating improved shoulder IR but is still difficult.  Patient reporting overall improved tolerance to activity.      Pain:  [x] Yes  [] No Location: R shoulder Pain Rating: (0-10 scale) 4/10  Pain altered Tx:  [x] No  [] Yes  Action:  Comments:    Objective:  INTERVENTIONS  INTERVENTIONS  Reps/ Time Weight/ Level Completed  Today Comments          MODALITIES                      MANUAL        TPR to subscapularis R side 4 mins  x To pt's tolerance   Scapular mobs inferior superior, ML 4 mins  x    Grade I-II PA and inferior mobilization 4 mins  x Pain noted with inferior mobs   EXERCISES        Supine:       Shoulder PROM 10x 3-5\"  x All directions   Shoulder AAROM 10x 5\" x Dowel; flexion, ER/IR, abduction   Scap protraction 10x2  x With dowel                 Seated:       Pulleys 10x3'' ea  x Flex, abd, IR (standing)   Scap Retraction 10x2 3'' x    Scapular depression 15x  x  added          Standing:       Corner Pec stretch 3x20''  x    Wall slides flexion and ABD 10x3'' ea  x Clicking noted and added therapist assist scapular depression- Flexion only   Shoulder isometrics 10x3'' ea   Flexion, ER, abd, IR, extension                            Assessment: [] Progressing toward goals.    [] No

## 2024-06-04 ENCOUNTER — HOSPITAL ENCOUNTER (OUTPATIENT)
Dept: PHYSICAL THERAPY | Age: 64
Setting detail: THERAPIES SERIES
Discharge: HOME | End: 2024-06-04
Payer: COMMERCIAL

## 2024-06-04 PROCEDURE — 97140 MANUAL THERAPY 1/> REGIONS: CPT

## 2024-06-04 PROCEDURE — 97110 THERAPEUTIC EXERCISES: CPT

## 2024-06-04 NOTE — FLOWSHEET NOTE
reps - 3-5 sec hold  - Standing shoulder flexion wall slides  - 1 x daily - 7 x weekly - 2-3 sets - 10 reps  - Shoulder extension with resistance - Neutral  - 1 x daily - 7 x weekly - 1-3 sets - 10 reps  - Standing Shoulder Row with Anchored Resistance  - 1 x daily - 7 x weekly - 1-3 sets - 10 reps  - Shoulder External Rotation with Anchored Resistance  - 1 x daily - 7 x weekly - 1-3 sets - 10 reps  - Shoulder Internal Rotation with Resistance  - 1 x daily - 7 x weekly - 1-3 sets - 10 reps    Pt. Education:  [x] Yes  [] No  [x] Reviewed Prior HEP/Ed  Method of Education: [x] Verbal  [x] Demo  [] Written  Comprehension of Education:  [x] Verbalizes understanding.  [x] Demonstrates understanding.  [] Needs review.  [x] Demonstrates/verbalizes HEP/Ed previously given.     Plan: [x] Continue per plan of care.   [] Other:      Treatment Charges: Mins Units   []  Modalities     [x]  Ther Exercise 32 2   [x]  Manual Therapy 8 1   []  Ther Activities     []  Aquatics     []  Neuromuscular     [] Vasocompression     [] Gait Training     [] Dry needling        [] 1 or 2 muscles        [] 3 or more muscles     []  Other     Total Billable timed codes 40 3     Time In: 0915am          Time Out: 0955am    Electronically signed by:  Perico Reddy PT

## 2024-06-07 ENCOUNTER — HOSPITAL ENCOUNTER (OUTPATIENT)
Dept: PHYSICAL THERAPY | Age: 64
Setting detail: THERAPIES SERIES
Discharge: HOME | End: 2024-06-07
Payer: COMMERCIAL

## 2024-06-07 PROCEDURE — 97140 MANUAL THERAPY 1/> REGIONS: CPT

## 2024-06-07 PROCEDURE — 97110 THERAPEUTIC EXERCISES: CPT

## 2024-06-07 NOTE — FLOWSHEET NOTE
[facility] Outpatient Physical Therapy   [address]   Phone: [phone]   Fax: [phone]    Physical Therapy Daily Treatment       Date:  2024  Patient Name:  Annalisa Sams    :  1960  MRN: [id number]  Physician: Kvng Henry MD                                Insurance: [facility] Approval valid 24-24   Medical Diagnosis:   M25.511 (ICD-10-CM) - Right shoulder pain, unspecified chronicity   Rehab Codes: M25.511 right shoulder pain, M25.611 Right shoulder stiffness   Onset Date: 2024                                 Next 's appt: None scheduled  Visit# / total visits:   Cancels/No Shows: 0/0    Precautions: Vertigo     Subjective: Patient reporting to therapy with no new concerns or complaints.  Patient reporting continued soreness in end ranges.    Pain:  [x] Yes  [] No Location: R shoulder Pain Rating: (0-10 scale) 3/10   Pain altered Tx:  [x] No  [] Yes  Action:  Comments:    Objective:  INTERVENTIONS  INTERVENTIONS  Reps/ Time Weight/ Level Completed  Today Comments          MODALITIES                      MANUAL        TPR to subscapularis R side 4 mins  x To pt's tolerance   Scapular mobs inferior superior, ML 4 mins  x    Grade I-II PA and inferior mobilization 4 mins  x Pain noted with inferior mobs   STM to R Bicep and Medial delt 5 mins  x 6/7 added   EXERCISES        Supine:       Shoulder PROM 10x 3-5\"  x All directions- tried to add scap depression with flexion and ABD with no change in symptoms noted   Shoulder AAROM 10x 5\"  Dowel; flexion, ER/IR, abduction   Scap protraction 15x2  x With dowel                 Seated:       Pulleys 15x3'' ea  x Flex, abd, IR (standing)   Scap Retraction 10x2 3'' x    Scapular depression 20x  x  added   Thoracic ext over chair 10x             Standing:       Corner Pec stretch 3x20''      Wall slides flexion  10x3'' ea      IR/ER x10 Red     Rows/Ext x10ea Red

## 2024-06-11 ENCOUNTER — HOSPITAL ENCOUNTER (OUTPATIENT)
Dept: PHYSICAL THERAPY | Age: 64
Setting detail: THERAPIES SERIES
Discharge: HOME | End: 2024-06-11
Payer: COMMERCIAL

## 2024-06-11 PROCEDURE — 97110 THERAPEUTIC EXERCISES: CPT

## 2024-06-11 NOTE — FLOWSHEET NOTE
[facility] Outpatient Physical Therapy   [address]   Phone: [phone]   Fax: [phone]    Physical Therapy Daily Treatment       Date:  2024  Patient Name:  Annalisa Sams    :  1960  MRN: [id number]  Physician: Kvng Henry MD                                Insurance: [facility] Approval valid 24-24   Medical Diagnosis:   M25.511 (ICD-10-CM) - Right shoulder pain, unspecified chronicity   Rehab Codes: M25.511 right shoulder pain, M25.611 Right shoulder stiffness   Onset Date: 2024                                 Next 's appt: None scheduled  Visit# / total visits:   Cancels/No Shows: 0/0    Precautions: Vertigo     Subjective: Patient requesting that Friday be her last appt stating she feels like she is somewhat back to normal.  Patient reporting reaching up overhead and behind her back are still difficult.    Pain:  [x] Yes  [] No Location: R shoulder Pain Rating: (0-10 scale) 3/10   Pain altered Tx:  [x] No  [] Yes  Action:  Comments:    Objective:  INTERVENTIONS  INTERVENTIONS  Reps/ Time Weight/ Level Completed  Today Comments          MODALITIES                      MANUAL        TPR to subscapularis R side 4 mins   To pt's tolerance   Scapular mobs inferior superior, ML 4 mins      Grade I-II PA and inferior mobilization 4 mins   Pain noted with inferior mobs   STM to R Bicep and Medial delt 5 mins   6/7 added   EXERCISES        Supine:       Shoulder PROM 10x 3-5\"   All directions- tried to add scap depression with flexion and ABD with no change in symptoms noted   Shoulder AAROM 10x 5\"  Dowel; flexion, ER/IR, abduction   Scap protraction 15x2  x With dowel                 Seated:       Pulleys 15x3'' ea   Flex, abd, IR (standing)   Scap Retraction 10x2 3'' x    Scapular depression 20x   5/31 added   Thoracic ext over chair 10x      AAROM shoulder flexion 10x2  x  added   Shoulder flexion 10x2 1# x  added   Shoulder ABD

## 2024-06-14 ENCOUNTER — HOSPITAL ENCOUNTER (OUTPATIENT)
Dept: PHYSICAL THERAPY | Age: 64
Setting detail: THERAPIES SERIES
Discharge: HOME | End: 2024-06-14
Payer: COMMERCIAL

## 2024-06-14 PROCEDURE — 97110 THERAPEUTIC EXERCISES: CPT

## 2024-06-14 NOTE — THERAPY DISCHARGE
[facility] Outpatient Physical Therapy   [address]   Phone: [phone]   Fax: [phone]    Physical Therapy Discharge note.       Date:  2024  Patient Name:  Annalisa Sams    :  1960  MRN: [id number]  Physician: Kvng Henry MD                                Insurance: [facility] Approval valid 24-24   Medical Diagnosis:   M25.511 (ICD-10-CM) - Right shoulder pain, unspecified chronicity   Rehab Codes: M25.511 right shoulder pain, M25.611 Right shoulder stiffness   Onset Date: 2024                                 Next 's appt: None scheduled  Visit# / total visits: 10/12  Cancels/No Shows: 0/0  Date of initial visit: 24  Reporting period:-24  Precautions: Vertigo     Subjective:   Patient reports that she is ready for discharge but she still has some difficulty with IR and reaching overhead but it has gotten easier.     Pain:  [x] Yes  [] No Location: R shoulder Pain Rating: (0-10 scale) 3/10   Pain altered Tx:  [x] No  [] Yes  Action:  Comments:    Objective: p = pain, L = Lacks WNL=within normal limits                         ROM  ° A/P STRENGTH   ROM     Left Right Left Right Cervical     Shoulder Flex 160 125 5/5 4+/5 Flexion 30   Abduction 140 140 5/5 4+/5 Extension 50   ER T3 T2 5/5 4+/5 Rotation L60 R60    IR T10 T12 5/5 4+/5 Side bending L20 R 20   Elbow Flex     5/5 5/5         Ext     5/5 5/5             SPADI:42/130=32% impairment      Assessment:   : Patient has attended 10 visits of physical therapy with improvement in motion, strength, endurance, and functional ability. Patient demonstrates understanding of exercises at home and progressions.She demostrated ability to lift from waist to overhead 10x without increase in pain. She does note fatigue at the end of reps. All questions answered and patient was given green theraband to progress with exercises at home. Patient is to be discharged at this time. If

## 2024-07-15 ENCOUNTER — HOSPITAL ENCOUNTER (OUTPATIENT)
Dept: WOMENS IMAGING | Age: 64
Discharge: HOME | End: 2024-07-17
Payer: COMMERCIAL

## 2024-07-15 VITALS — BODY MASS INDEX: 25.18 KG/M2 | WEIGHT: 156 LBS

## 2024-07-15 DIAGNOSIS — Z12.31 ENCOUNTER FOR SCREENING MAMMOGRAM FOR MALIGNANT NEOPLASM OF BREAST: ICD-10-CM

## 2024-07-15 PROCEDURE — 77063 BREAST TOMOSYNTHESIS BI: CPT

## 2024-12-02 ENCOUNTER — HOSPITAL ENCOUNTER (OUTPATIENT)
Age: 64
Discharge: HOME | End: 2024-12-02
Payer: COMMERCIAL

## 2024-12-02 DIAGNOSIS — E13.22 SECONDARY DIABETES MELLITUS WITH STAGE 3 CHRONIC KIDNEY DISEASE (HCC): ICD-10-CM

## 2024-12-02 DIAGNOSIS — N18.30 BENIGN HYPERTENSION WITH CKD (CHRONIC KIDNEY DISEASE) STAGE III (HCC): ICD-10-CM

## 2024-12-02 DIAGNOSIS — R80.9 PROTEINURIA, UNSPECIFIED TYPE: ICD-10-CM

## 2024-12-02 DIAGNOSIS — N18.30 SECONDARY DIABETES MELLITUS WITH STAGE 3 CHRONIC KIDNEY DISEASE (HCC): ICD-10-CM

## 2024-12-02 DIAGNOSIS — N18.31 STAGE 3A CHRONIC KIDNEY DISEASE (HCC): ICD-10-CM

## 2024-12-02 DIAGNOSIS — I12.9 BENIGN HYPERTENSION WITH CKD (CHRONIC KIDNEY DISEASE) STAGE III (HCC): ICD-10-CM

## 2024-12-02 LAB
ANION GAP SERPL CALCULATED.3IONS-SCNC: 9 MMOL/L (ref 9–16)
BACTERIA URNS QL MICRO: ABNORMAL
BASOPHILS # BLD: 0.1 K/UL (ref 0–0.2)
BASOPHILS NFR BLD: 1 % (ref 0–2)
BILIRUB UR QL STRIP: NEGATIVE
BUN SERPL-MCNC: 31 MG/DL (ref 8–23)
CALCIUM SERPL-MCNC: 9.6 MG/DL (ref 8.6–10.4)
CASTS #/AREA URNS LPF: ABNORMAL /LPF
CHLORIDE SERPL-SCNC: 102 MMOL/L (ref 98–107)
CLARITY UR: ABNORMAL
CO2 SERPL-SCNC: 24 MMOL/L (ref 20–31)
COLOR UR: YELLOW
CREAT SERPL-MCNC: 1.3 MG/DL (ref 0.7–1.2)
EOSINOPHIL # BLD: 0.2 K/UL (ref 0–0.4)
EOSINOPHILS RELATIVE PERCENT: 2 % (ref 0–4)
EPI CELLS #/AREA URNS HPF: ABNORMAL /HPF
ERYTHROCYTE [DISTWIDTH] IN BLOOD BY AUTOMATED COUNT: 15 % (ref 11.5–14.9)
GFR, ESTIMATED: 45 ML/MIN/1.73M2
GLUCOSE SERPL-MCNC: 100 MG/DL (ref 74–99)
GLUCOSE UR STRIP-MCNC: ABNORMAL MG/DL
HCT VFR BLD AUTO: 40.3 % (ref 36–46)
HGB BLD-MCNC: 13.5 G/DL (ref 12–16)
HGB UR QL STRIP.AUTO: NEGATIVE
KETONES UR STRIP-MCNC: NEGATIVE MG/DL
LEUKOCYTE ESTERASE UR QL STRIP: NEGATIVE
LYMPHOCYTES NFR BLD: 2.4 K/UL (ref 1–4.8)
LYMPHOCYTES RELATIVE PERCENT: 26 % (ref 24–44)
MAGNESIUM SERPL-MCNC: 2.2 MG/DL (ref 1.6–2.4)
MCH RBC QN AUTO: 31.9 PG (ref 26–34)
MCHC RBC AUTO-ENTMCNC: 33.6 G/DL (ref 31–37)
MCV RBC AUTO: 95 FL (ref 80–100)
MONOCYTES NFR BLD: 0.8 K/UL (ref 0.1–1.3)
MONOCYTES NFR BLD: 9 % (ref 1–7)
NEUTROPHILS NFR BLD: 62 % (ref 36–66)
NEUTS SEG NFR BLD: 5.7 K/UL (ref 1.3–9.1)
NITRITE UR QL STRIP: NEGATIVE
PH UR STRIP: 5 [PH] (ref 5–8)
PHOSPHATE SERPL-MCNC: 4.6 MG/DL (ref 2.5–4.5)
PLATELET # BLD AUTO: 202 K/UL (ref 150–450)
PMV BLD AUTO: 8.4 FL (ref 6–12)
POTASSIUM SERPL-SCNC: 4.4 MMOL/L (ref 3.7–5.3)
PROT UR STRIP-MCNC: ABNORMAL MG/DL
RBC # BLD AUTO: 4.24 M/UL (ref 4–5.2)
RBC #/AREA URNS HPF: ABNORMAL /HPF
SODIUM SERPL-SCNC: 135 MMOL/L (ref 136–145)
SP GR UR STRIP: 1.02 (ref 1–1.03)
UROBILINOGEN UR STRIP-ACNC: NORMAL EU/DL (ref 0–1)
WBC #/AREA URNS HPF: ABNORMAL /HPF
WBC OTHER # BLD: 9.2 K/UL (ref 3.5–11)

## 2024-12-02 PROCEDURE — 80048 BASIC METABOLIC PNL TOTAL CA: CPT

## 2024-12-02 PROCEDURE — 83735 ASSAY OF MAGNESIUM: CPT

## 2024-12-02 PROCEDURE — 84100 ASSAY OF PHOSPHORUS: CPT

## 2024-12-02 PROCEDURE — 85025 COMPLETE CBC W/AUTO DIFF WBC: CPT

## 2024-12-02 PROCEDURE — 81001 URINALYSIS AUTO W/SCOPE: CPT

## 2024-12-02 PROCEDURE — 36415 COLL VENOUS BLD VENIPUNCTURE: CPT

## 2025-02-10 ENCOUNTER — HOSPITAL ENCOUNTER (OUTPATIENT)
Dept: NEUROLOGY | Age: 65
Discharge: HOME | End: 2025-02-10
Payer: COMMERCIAL

## 2025-02-10 DIAGNOSIS — R20.2 NUMBNESS AND TINGLING IN LEFT HAND: ICD-10-CM

## 2025-02-10 DIAGNOSIS — R20.0 NUMBNESS AND TINGLING IN LEFT HAND: ICD-10-CM

## 2025-02-10 DIAGNOSIS — M79.642 LEFT HAND PAIN: ICD-10-CM

## 2025-02-10 PROCEDURE — 95886 MUSC TEST DONE W/N TEST COMP: CPT | Performed by: PHYSICAL MEDICINE & REHABILITATION

## 2025-02-10 PROCEDURE — 95910 NRV CNDJ TEST 7-8 STUDIES: CPT | Performed by: PHYSICAL MEDICINE & REHABILITATION

## 2025-04-09 DIAGNOSIS — I70.213 ATHEROSCLER OF NATIVE ARTERY OF BOTH LEGS WITH INTERMIT CLAUDICATION: ICD-10-CM

## 2025-04-09 DIAGNOSIS — I71.41 PARARENAL ABDOMINAL AORTIC ANEURYSM (AAA) WITHOUT RUPTURE: ICD-10-CM

## 2025-04-16 ENCOUNTER — HOSPITAL ENCOUNTER (OUTPATIENT)
Dept: CT IMAGING | Age: 65
Discharge: HOME | End: 2025-04-18
Payer: COMMERCIAL

## 2025-04-16 ENCOUNTER — APPOINTMENT (OUTPATIENT)
Dept: NEUROLOGY | Age: 65
End: 2025-04-16
Payer: COMMERCIAL

## 2025-04-16 VITALS — BODY MASS INDEX: 25.23 KG/M2 | WEIGHT: 157 LBS | HEIGHT: 66 IN

## 2025-04-16 DIAGNOSIS — Z87.891 PERSONAL HISTORY OF TOBACCO USE: ICD-10-CM

## 2025-04-16 PROCEDURE — 71271 CT THORAX LUNG CANCER SCR C-: CPT

## 2025-04-25 ENCOUNTER — TELEPHONE (OUTPATIENT)
Dept: VASCULAR SURGERY | Age: 65
End: 2025-04-25

## 2025-04-25 NOTE — TELEPHONE ENCOUNTER
Spoke with patient letting her know that she needs testing prior to her appointment with us on 5/12 the writer offered to transfer her to central scheduling patient stated no that she still has time and that she will do it next week

## 2025-05-01 ENCOUNTER — HOSPITAL ENCOUNTER (OUTPATIENT)
Dept: CT IMAGING | Age: 65
Discharge: HOME | End: 2025-05-03
Attending: SURGERY
Payer: COMMERCIAL

## 2025-05-01 ENCOUNTER — HOSPITAL ENCOUNTER (OUTPATIENT)
Age: 65
Discharge: HOME | End: 2025-05-01
Attending: SURGERY
Payer: COMMERCIAL

## 2025-05-01 DIAGNOSIS — I71.43 INFRARENAL ABDOMINAL AORTIC ANEURYSM (AAA) WITHOUT RUPTURE: Primary | ICD-10-CM

## 2025-05-01 DIAGNOSIS — F17.200 SMOKER: ICD-10-CM

## 2025-05-01 DIAGNOSIS — N18.30 BENIGN HYPERTENSION WITH CKD (CHRONIC KIDNEY DISEASE) STAGE III (HCC): ICD-10-CM

## 2025-05-01 DIAGNOSIS — J43.9 PULMONARY EMPHYSEMA, UNSPECIFIED EMPHYSEMA TYPE (HCC): ICD-10-CM

## 2025-05-01 DIAGNOSIS — I12.9 BENIGN HYPERTENSION WITH CKD (CHRONIC KIDNEY DISEASE) STAGE III (HCC): ICD-10-CM

## 2025-05-01 DIAGNOSIS — E11.22 TYPE 2 DIABETES MELLITUS WITH CHRONIC KIDNEY DISEASE, WITHOUT LONG-TERM CURRENT USE OF INSULIN, UNSPECIFIED CKD STAGE (HCC): ICD-10-CM

## 2025-05-01 DIAGNOSIS — G62.9 NEUROPATHY: ICD-10-CM

## 2025-05-01 DIAGNOSIS — I71.43 INFRARENAL ABDOMINAL AORTIC ANEURYSM (AAA) WITHOUT RUPTURE: ICD-10-CM

## 2025-05-01 DIAGNOSIS — E66.3 OVERWEIGHT (BMI 25.0-29.9): ICD-10-CM

## 2025-05-01 LAB
ALBUMIN SERPL-MCNC: 3.8 G/DL (ref 3.5–5.2)
ALP SERPL-CCNC: 89 U/L (ref 35–104)
ALT SERPL-CCNC: 12 U/L (ref 10–35)
ANION GAP SERPL CALCULATED.3IONS-SCNC: 9 MMOL/L (ref 9–16)
AST SERPL-CCNC: 18 U/L (ref 10–35)
BILIRUB SERPL-MCNC: <0.2 MG/DL (ref 0–1.2)
BUN SERPL-MCNC: 34 MG/DL (ref 8–23)
CALCIUM SERPL-MCNC: 9.1 MG/DL (ref 8.6–10.4)
CHLORIDE SERPL-SCNC: 102 MMOL/L (ref 98–107)
CHOLEST SERPL-MCNC: 141 MG/DL (ref 0–199)
CHOLESTEROL/HDL RATIO: 5.6
CO2 SERPL-SCNC: 23 MMOL/L (ref 20–31)
CREAT SERPL-MCNC: 1.2 MG/DL (ref 0.7–1.2)
EGFR, POC: 62 ML/MIN/1.73M2
ERYTHROCYTE [DISTWIDTH] IN BLOOD BY AUTOMATED COUNT: 14 % (ref 11.5–14.9)
GFR, ESTIMATED: 50 ML/MIN/1.73M2
GLUCOSE SERPL-MCNC: 179 MG/DL (ref 74–99)
HCT VFR BLD AUTO: 37.2 % (ref 36–46)
HDLC SERPL-MCNC: 25 MG/DL
HGB BLD-MCNC: 12.6 G/DL (ref 12–16)
LDLC SERPL CALC-MCNC: 55 MG/DL (ref 0–100)
MCH RBC QN AUTO: 31.3 PG (ref 26–34)
MCHC RBC AUTO-ENTMCNC: 33.8 G/DL (ref 31–37)
MCV RBC AUTO: 92.8 FL (ref 80–100)
PLATELET # BLD AUTO: 152 K/UL (ref 150–450)
PMV BLD AUTO: 8.5 FL (ref 6–12)
POC CREATININE: 1 MG/DL (ref 0.51–1.19)
POTASSIUM SERPL-SCNC: 4.2 MMOL/L (ref 3.7–5.3)
PROT SERPL-MCNC: 7 G/DL (ref 6.6–8.7)
RBC # BLD AUTO: 4.01 M/UL (ref 4–5.2)
SODIUM SERPL-SCNC: 134 MMOL/L (ref 136–145)
TRIGL SERPL-MCNC: 304 MG/DL (ref 0–149)
TSH SERPL DL<=0.05 MIU/L-ACNC: 1.8 UIU/ML (ref 0.27–4.2)
WBC OTHER # BLD: 7 K/UL (ref 3.5–11)

## 2025-05-01 PROCEDURE — 36415 COLL VENOUS BLD VENIPUNCTURE: CPT

## 2025-05-01 PROCEDURE — 6360000004 HC RX CONTRAST MEDICATION: Performed by: SURGERY

## 2025-05-01 PROCEDURE — 2500000003 HC RX 250 WO HCPCS: Performed by: SURGERY

## 2025-05-01 PROCEDURE — 75635 CT ANGIO ABDOMINAL ARTERIES: CPT

## 2025-05-01 PROCEDURE — 80053 COMPREHEN METABOLIC PANEL: CPT

## 2025-05-01 PROCEDURE — 84443 ASSAY THYROID STIM HORMONE: CPT

## 2025-05-01 PROCEDURE — 82565 ASSAY OF CREATININE: CPT

## 2025-05-01 PROCEDURE — 80061 LIPID PANEL: CPT

## 2025-05-01 PROCEDURE — 85027 COMPLETE CBC AUTOMATED: CPT

## 2025-05-01 PROCEDURE — 2580000003 HC RX 258: Performed by: SURGERY

## 2025-05-01 RX ORDER — IOPAMIDOL 755 MG/ML
100 INJECTION, SOLUTION INTRAVASCULAR
Status: COMPLETED | OUTPATIENT
Start: 2025-05-01 | End: 2025-05-01

## 2025-05-01 RX ORDER — 0.9 % SODIUM CHLORIDE 0.9 %
100 INTRAVENOUS SOLUTION INTRAVENOUS ONCE
Status: COMPLETED | OUTPATIENT
Start: 2025-05-01 | End: 2025-05-01

## 2025-05-01 RX ORDER — SODIUM CHLORIDE 0.9 % (FLUSH) 0.9 %
10 SYRINGE (ML) INJECTION PRN
Status: DISCONTINUED | OUTPATIENT
Start: 2025-05-01 | End: 2025-05-04 | Stop reason: HOSPADM

## 2025-05-01 RX ADMIN — IOPAMIDOL 100 ML: 755 INJECTION, SOLUTION INTRAVENOUS at 11:09

## 2025-05-01 RX ADMIN — SODIUM CHLORIDE, PRESERVATIVE FREE 10 ML: 5 INJECTION INTRAVENOUS at 11:10

## 2025-05-01 RX ADMIN — SODIUM CHLORIDE 100 ML: 9 INJECTION, SOLUTION INTRAVENOUS at 11:10

## 2025-05-12 ENCOUNTER — OFFICE VISIT (OUTPATIENT)
Dept: VASCULAR SURGERY | Age: 65
End: 2025-05-12
Payer: COMMERCIAL

## 2025-05-12 VITALS
BODY MASS INDEX: 24.65 KG/M2 | HEART RATE: 100 BPM | SYSTOLIC BLOOD PRESSURE: 159 MMHG | WEIGHT: 153.4 LBS | HEIGHT: 66 IN | DIASTOLIC BLOOD PRESSURE: 72 MMHG | OXYGEN SATURATION: 95 %

## 2025-05-12 DIAGNOSIS — I71.43 INFRARENAL ABDOMINAL AORTIC ANEURYSM (AAA) WITHOUT RUPTURE: Primary | ICD-10-CM

## 2025-05-12 DIAGNOSIS — I65.23 BILATERAL CAROTID ARTERY STENOSIS: ICD-10-CM

## 2025-05-12 DIAGNOSIS — I70.213 ATHEROSCLER OF NATIVE ARTERY OF BOTH LEGS WITH INTERMIT CLAUDICATION: ICD-10-CM

## 2025-05-12 PROCEDURE — 99213 OFFICE O/P EST LOW 20 MIN: CPT | Performed by: SURGERY

## 2025-05-12 PROCEDURE — 1159F MED LIST DOCD IN RCRD: CPT | Performed by: SURGERY

## 2025-05-12 PROCEDURE — 3078F DIAST BP <80 MM HG: CPT | Performed by: SURGERY

## 2025-05-12 PROCEDURE — 1123F ACP DISCUSS/DSCN MKR DOCD: CPT | Performed by: SURGERY

## 2025-05-12 PROCEDURE — 3077F SYST BP >= 140 MM HG: CPT | Performed by: SURGERY

## 2025-05-12 NOTE — PROGRESS NOTES
[facility] HEART AND VASCULAR  [address] [address]  Dept: [phone]     Patient: Annalisa Sams  : 1960  MRN: [id number]  DOS: 2025            HPI:  Annalisa Sams is a 64 y.o. female who returns to the office regarding her infrarenal abdominal aortic aneurysm and carotid artery stenosis.  We have not studied her carotid arteries for quite some time.  I think it has been as many as 2 years.  Her aneurysm has been treated with an endograft at an outside institution at that institution the left limb of the graft went down requiring a femorofemoral bypass.  I saw her several years ago for the first time regarding bilateral lower extremity claudication and rest pain.  She had a stenosis in the common femoral artery on the right prior to the takeoff of the femorofemoral bypass which is actually based off of the SFA.  I went ahead and revise the right limb of the bypass graft with endarterectomy and bovine pericardial patch angioplasty and then based the graft off of the common femoral proper.  She had a recent CTA revealing no significant stenoses.  Her SFAs are patent.  Her popliteals are patent.  Her ABIs were low but were not reexamined for this visit.  She is not complaining of claudication rest pain or ulceration.    Review of Systems    Vitals:    25 1009   BP: (!) 159/72   BP Site: Left Upper Arm   Patient Position: Sitting   BP Cuff Size: Medium Adult   Pulse: 100   SpO2: 95%   Weight: 69.6 kg (153 lb 6.4 oz)   Height: 1.676 m (5' 6\")          Physical Exam  On examination I can still not palpate dorsalis pedis or posterior tibial pulses in either lower extremity.  She has no dependent rubor.  She does have palpable femoral pulses bilaterally.  I cannot palpate popliteals as well.  I do know that her SFAs are very small bilaterally.  She has no ulceration.  She has no gangrene.    Assessment:  1. Infrarenal  Left message for patient with unchanged carotid u/s results. No changes. Keep f/u appt 6/2019.

## 2025-06-12 ENCOUNTER — HOSPITAL ENCOUNTER (OUTPATIENT)
Age: 65
Setting detail: SPECIMEN
Discharge: HOME | End: 2025-06-12

## 2025-06-12 DIAGNOSIS — R82.71 BACTERIA IN URINE: ICD-10-CM

## 2025-06-12 DIAGNOSIS — N18.31 STAGE 3A CHRONIC KIDNEY DISEASE (HCC): ICD-10-CM

## 2025-06-12 DIAGNOSIS — I12.9 BENIGN HYPERTENSION WITH CKD (CHRONIC KIDNEY DISEASE) STAGE III (HCC): ICD-10-CM

## 2025-06-12 DIAGNOSIS — R82.998 URINE WBC INCREASED: ICD-10-CM

## 2025-06-12 DIAGNOSIS — E13.22 SECONDARY DIABETES MELLITUS WITH STAGE 3 CHRONIC KIDNEY DISEASE (HCC): ICD-10-CM

## 2025-06-12 DIAGNOSIS — N18.30 SECONDARY DIABETES MELLITUS WITH STAGE 3 CHRONIC KIDNEY DISEASE (HCC): ICD-10-CM

## 2025-06-12 DIAGNOSIS — E83.39 HYPERPHOSPHATEMIA: ICD-10-CM

## 2025-06-12 DIAGNOSIS — N18.30 BENIGN HYPERTENSION WITH CKD (CHRONIC KIDNEY DISEASE) STAGE III (HCC): ICD-10-CM

## 2025-06-12 LAB
ANION GAP SERPL CALCULATED.3IONS-SCNC: 14 MMOL/L (ref 9–16)
BACTERIA URNS QL MICRO: ABNORMAL
BASOPHILS # BLD: 0.08 K/UL (ref 0–0.2)
BASOPHILS NFR BLD: 1 % (ref 0–2)
BILIRUB UR QL STRIP: NEGATIVE
BUN SERPL-MCNC: 26 MG/DL (ref 8–23)
CALCIUM SERPL-MCNC: 9.7 MG/DL (ref 8.6–10.4)
CASTS #/AREA URNS LPF: ABNORMAL /LPF (ref 0–8)
CHLORIDE SERPL-SCNC: 99 MMOL/L (ref 98–107)
CLARITY UR: CLEAR
CO2 SERPL-SCNC: 22 MMOL/L (ref 20–31)
COLOR UR: YELLOW
CREAT SERPL-MCNC: 1.3 MG/DL (ref 0.6–0.9)
EOSINOPHIL # BLD: 0.22 K/UL (ref 0–0.44)
EOSINOPHILS RELATIVE PERCENT: 2 % (ref 1–4)
EPI CELLS #/AREA URNS HPF: ABNORMAL /HPF (ref 0–5)
ERYTHROCYTE [DISTWIDTH] IN BLOOD BY AUTOMATED COUNT: 13.9 % (ref 11.8–14.4)
GFR, ESTIMATED: 45 ML/MIN/1.73M2
GLUCOSE SERPL-MCNC: 117 MG/DL (ref 74–99)
GLUCOSE UR STRIP-MCNC: ABNORMAL MG/DL
HCT VFR BLD AUTO: 40.4 % (ref 36.3–47.1)
HGB BLD-MCNC: 12.7 G/DL (ref 11.9–15.1)
HGB UR QL STRIP.AUTO: NEGATIVE
IMM GRANULOCYTES # BLD AUTO: 0.04 K/UL (ref 0–0.3)
IMM GRANULOCYTES NFR BLD: 0 %
KETONES UR STRIP-MCNC: NEGATIVE MG/DL
LEUKOCYTE ESTERASE UR QL STRIP: ABNORMAL
LYMPHOCYTES NFR BLD: 2.61 K/UL (ref 1.1–3.7)
LYMPHOCYTES RELATIVE PERCENT: 24 % (ref 24–43)
MAGNESIUM SERPL-MCNC: 2.2 MG/DL (ref 1.6–2.4)
MCH RBC QN AUTO: 30.5 PG (ref 25.2–33.5)
MCHC RBC AUTO-ENTMCNC: 31.4 G/DL (ref 28.4–34.8)
MCV RBC AUTO: 97.1 FL (ref 82.6–102.9)
MONOCYTES NFR BLD: 0.79 K/UL (ref 0.1–1.2)
MONOCYTES NFR BLD: 7 % (ref 3–12)
NEUTROPHILS NFR BLD: 66 % (ref 36–65)
NEUTS SEG NFR BLD: 7.24 K/UL (ref 1.5–8.1)
NITRITE UR QL STRIP: NEGATIVE
NRBC BLD-RTO: 0 PER 100 WBC
PH UR STRIP: 5.5 [PH] (ref 5–8)
PHOSPHATE SERPL-MCNC: 3.7 MG/DL (ref 2.5–4.5)
PLATELET # BLD AUTO: 208 K/UL (ref 138–453)
PMV BLD AUTO: 11.1 FL (ref 8.1–13.5)
POTASSIUM SERPL-SCNC: 4.5 MMOL/L (ref 3.7–5.3)
PROT UR STRIP-MCNC: NEGATIVE MG/DL
RBC # BLD AUTO: 4.16 M/UL (ref 3.95–5.11)
RBC #/AREA URNS HPF: ABNORMAL /HPF (ref 0–4)
SODIUM SERPL-SCNC: 135 MMOL/L (ref 136–145)
SP GR UR STRIP: 1.01 (ref 1–1.03)
UROBILINOGEN UR STRIP-ACNC: NORMAL EU/DL (ref 0–1)
WBC #/AREA URNS HPF: ABNORMAL /HPF (ref 0–5)
WBC OTHER # BLD: 11 K/UL (ref 3.5–11.3)

## 2025-06-23 ENCOUNTER — HOSPITAL ENCOUNTER (INPATIENT)
Age: 65
LOS: 2 days | Discharge: PSYCHIATRIC OR SUBSTANCE USE FACILITY | DRG: 057 | End: 2025-06-25
Attending: EMERGENCY MEDICINE | Admitting: STUDENT IN AN ORGANIZED HEALTH CARE EDUCATION/TRAINING PROGRAM
Payer: COMMERCIAL

## 2025-06-23 ENCOUNTER — APPOINTMENT (OUTPATIENT)
Dept: CT IMAGING | Age: 65
DRG: 057 | End: 2025-06-23
Payer: COMMERCIAL

## 2025-06-23 DIAGNOSIS — G25.5 CHOREA: ICD-10-CM

## 2025-06-23 DIAGNOSIS — R45.851 DEPRESSION WITH SUICIDAL IDEATION: Primary | ICD-10-CM

## 2025-06-23 DIAGNOSIS — F32.A DEPRESSION WITH SUICIDAL IDEATION: Primary | ICD-10-CM

## 2025-06-23 PROBLEM — G25.9 MOVEMENT DISORDER: Status: ACTIVE | Noted: 2025-06-23

## 2025-06-23 PROBLEM — N30.01 ACUTE CYSTITIS WITH HEMATURIA: Status: ACTIVE | Noted: 2025-06-23

## 2025-06-23 LAB
ALBUMIN SERPL-MCNC: 4.4 G/DL (ref 3.5–5.2)
ALP SERPL-CCNC: 102 U/L (ref 35–104)
ALT SERPL-CCNC: 14 U/L (ref 10–35)
AMPHET UR QL SCN: NEGATIVE
ANION GAP SERPL CALCULATED.3IONS-SCNC: 12 MMOL/L (ref 9–16)
AST SERPL-CCNC: 23 U/L (ref 10–35)
BACTERIA URNS QL MICRO: ABNORMAL
BARBITURATES UR QL SCN: NEGATIVE
BASOPHILS # BLD: 0.07 K/UL (ref 0–0.2)
BASOPHILS NFR BLD: 1 % (ref 0–2)
BENZODIAZ UR QL: NEGATIVE
BILIRUB SERPL-MCNC: <0.2 MG/DL (ref 0–1.2)
BILIRUB UR QL STRIP: NEGATIVE
BUN SERPL-MCNC: 19 MG/DL (ref 8–23)
CALCIUM SERPL-MCNC: 9.9 MG/DL (ref 8.6–10.4)
CANNABINOIDS UR QL SCN: NEGATIVE
CASTS #/AREA URNS LPF: ABNORMAL /LPF
CHLORIDE SERPL-SCNC: 103 MMOL/L (ref 98–107)
CLARITY UR: ABNORMAL
CO2 SERPL-SCNC: 23 MMOL/L (ref 20–31)
COCAINE UR QL SCN: NEGATIVE
COLOR UR: YELLOW
CREAT SERPL-MCNC: 1.2 MG/DL (ref 0.7–1.2)
EOSINOPHIL # BLD: 0.21 K/UL (ref 0–0.44)
EOSINOPHILS RELATIVE PERCENT: 2 % (ref 0–4)
EPI CELLS #/AREA URNS HPF: ABNORMAL /HPF
ERYTHROCYTE [DISTWIDTH] IN BLOOD BY AUTOMATED COUNT: 13.9 % (ref 11.5–14.9)
ETHANOL PERCENT: NORMAL %
ETHANOLAMINE SERPL-MCNC: <10 MG/DL (ref 0–0.08)
FENTANYL UR QL: NEGATIVE
GFR, ESTIMATED: 50 ML/MIN/1.73M2
GLUCOSE SERPL-MCNC: 107 MG/DL (ref 74–99)
GLUCOSE UR STRIP-MCNC: ABNORMAL MG/DL
HCT VFR BLD AUTO: 40.4 % (ref 36–46)
HGB BLD-MCNC: 13.2 G/DL (ref 12–16)
HGB UR QL STRIP.AUTO: ABNORMAL
IMM GRANULOCYTES # BLD AUTO: 0.03 K/UL (ref 0–0.3)
IMM GRANULOCYTES NFR BLD: 0 %
KETONES UR STRIP-MCNC: NEGATIVE MG/DL
LEUKOCYTE ESTERASE UR QL STRIP: ABNORMAL
LYMPHOCYTES NFR BLD: 2.22 K/UL (ref 1.1–3.7)
LYMPHOCYTES RELATIVE PERCENT: 25 % (ref 24–44)
MAGNESIUM SERPL-MCNC: 2.3 MG/DL (ref 1.6–2.4)
MCH RBC QN AUTO: 31.5 PG (ref 26–34)
MCHC RBC AUTO-ENTMCNC: 32.7 G/DL (ref 31–37)
MCV RBC AUTO: 96.4 FL (ref 80–100)
METHADONE UR QL: NEGATIVE
MONOCYTES NFR BLD: 0.7 K/UL (ref 0.1–1.2)
MONOCYTES NFR BLD: 8 % (ref 3–12)
NEUTROPHILS NFR BLD: 64 % (ref 36–66)
NEUTS SEG NFR BLD: 5.84 K/UL (ref 1.5–8.1)
NITRITE UR QL STRIP: NEGATIVE
NRBC BLD-RTO: 0 PER 100 WBC
OPIATES UR QL SCN: NEGATIVE
OXYCODONE UR QL SCN: NEGATIVE
PCP UR QL SCN: NEGATIVE
PH UR STRIP: 5.5 [PH] (ref 5–8)
PLATELET # BLD AUTO: 204 K/UL (ref 150–450)
PMV BLD AUTO: 10.8 FL (ref 8–13.5)
POTASSIUM SERPL-SCNC: 4.6 MMOL/L (ref 3.7–5.3)
PROT SERPL-MCNC: 8.2 G/DL (ref 6.6–8.7)
PROT UR STRIP-MCNC: ABNORMAL MG/DL
RBC # BLD AUTO: 4.19 M/UL (ref 3.95–5.11)
RBC #/AREA URNS HPF: ABNORMAL /HPF
SODIUM SERPL-SCNC: 138 MMOL/L (ref 136–145)
SP GR UR STRIP: 1.01 (ref 1–1.03)
TEST INFORMATION: NORMAL
UROBILINOGEN UR STRIP-ACNC: NORMAL EU/DL (ref 0–1)
WBC #/AREA URNS HPF: ABNORMAL /HPF
WBC OTHER # BLD: 9.1 K/UL (ref 3.5–11)

## 2025-06-23 PROCEDURE — 85025 COMPLETE CBC W/AUTO DIFF WBC: CPT

## 2025-06-23 PROCEDURE — 70450 CT HEAD/BRAIN W/O DYE: CPT

## 2025-06-23 PROCEDURE — 81001 URINALYSIS AUTO W/SCOPE: CPT

## 2025-06-23 PROCEDURE — 6360000002 HC RX W HCPCS: Performed by: STUDENT IN AN ORGANIZED HEALTH CARE EDUCATION/TRAINING PROGRAM

## 2025-06-23 PROCEDURE — 36415 COLL VENOUS BLD VENIPUNCTURE: CPT

## 2025-06-23 PROCEDURE — 6370000000 HC RX 637 (ALT 250 FOR IP): Performed by: EMERGENCY MEDICINE

## 2025-06-23 PROCEDURE — 6370000000 HC RX 637 (ALT 250 FOR IP): Performed by: STUDENT IN AN ORGANIZED HEALTH CARE EDUCATION/TRAINING PROGRAM

## 2025-06-23 PROCEDURE — 80053 COMPREHEN METABOLIC PANEL: CPT

## 2025-06-23 PROCEDURE — 80307 DRUG TEST PRSMV CHEM ANLYZR: CPT

## 2025-06-23 PROCEDURE — G0480 DRUG TEST DEF 1-7 CLASSES: HCPCS

## 2025-06-23 PROCEDURE — 82947 ASSAY GLUCOSE BLOOD QUANT: CPT

## 2025-06-23 PROCEDURE — 87086 URINE CULTURE/COLONY COUNT: CPT

## 2025-06-23 PROCEDURE — 99285 EMERGENCY DEPT VISIT HI MDM: CPT

## 2025-06-23 PROCEDURE — 1200000000 HC SEMI PRIVATE

## 2025-06-23 PROCEDURE — 83735 ASSAY OF MAGNESIUM: CPT

## 2025-06-23 PROCEDURE — 2500000003 HC RX 250 WO HCPCS: Performed by: STUDENT IN AN ORGANIZED HEALTH CARE EDUCATION/TRAINING PROGRAM

## 2025-06-23 RX ORDER — POTASSIUM CHLORIDE 1500 MG/1
40 TABLET, EXTENDED RELEASE ORAL PRN
Status: DISCONTINUED | OUTPATIENT
Start: 2025-06-23 | End: 2025-06-25 | Stop reason: HOSPADM

## 2025-06-23 RX ORDER — DEXTROSE MONOHYDRATE 100 MG/ML
INJECTION, SOLUTION INTRAVENOUS CONTINUOUS PRN
Status: DISCONTINUED | OUTPATIENT
Start: 2025-06-23 | End: 2025-06-25 | Stop reason: HOSPADM

## 2025-06-23 RX ORDER — SODIUM CHLORIDE 0.9 % (FLUSH) 0.9 %
5-40 SYRINGE (ML) INJECTION EVERY 12 HOURS SCHEDULED
Status: DISCONTINUED | OUTPATIENT
Start: 2025-06-23 | End: 2025-06-25 | Stop reason: HOSPADM

## 2025-06-23 RX ORDER — INSULIN LISPRO 100 [IU]/ML
0-8 INJECTION, SOLUTION INTRAVENOUS; SUBCUTANEOUS
Status: DISCONTINUED | OUTPATIENT
Start: 2025-06-23 | End: 2025-06-25 | Stop reason: HOSPADM

## 2025-06-23 RX ORDER — MAGNESIUM SULFATE HEPTAHYDRATE 40 MG/ML
2000 INJECTION, SOLUTION INTRAVENOUS PRN
Status: DISCONTINUED | OUTPATIENT
Start: 2025-06-23 | End: 2025-06-25 | Stop reason: HOSPADM

## 2025-06-23 RX ORDER — SODIUM CHLORIDE 0.9 % (FLUSH) 0.9 %
5-40 SYRINGE (ML) INJECTION PRN
Status: DISCONTINUED | OUTPATIENT
Start: 2025-06-23 | End: 2025-06-25 | Stop reason: HOSPADM

## 2025-06-23 RX ORDER — ACETAMINOPHEN 650 MG/1
650 SUPPOSITORY RECTAL EVERY 6 HOURS PRN
Status: DISCONTINUED | OUTPATIENT
Start: 2025-06-23 | End: 2025-06-25 | Stop reason: HOSPADM

## 2025-06-23 RX ORDER — ONDANSETRON 4 MG/1
4 TABLET, ORALLY DISINTEGRATING ORAL EVERY 8 HOURS PRN
Status: DISCONTINUED | OUTPATIENT
Start: 2025-06-23 | End: 2025-06-25 | Stop reason: HOSPADM

## 2025-06-23 RX ORDER — TRAZODONE HYDROCHLORIDE 100 MG/1
100 TABLET ORAL NIGHTLY
Status: DISCONTINUED | OUTPATIENT
Start: 2025-06-23 | End: 2025-06-25 | Stop reason: HOSPADM

## 2025-06-23 RX ORDER — ATORVASTATIN CALCIUM 20 MG/1
20 TABLET, FILM COATED ORAL NIGHTLY
Status: DISCONTINUED | OUTPATIENT
Start: 2025-06-23 | End: 2025-06-25 | Stop reason: HOSPADM

## 2025-06-23 RX ORDER — ONDANSETRON 2 MG/ML
4 INJECTION INTRAMUSCULAR; INTRAVENOUS EVERY 6 HOURS PRN
Status: DISCONTINUED | OUTPATIENT
Start: 2025-06-23 | End: 2025-06-25 | Stop reason: HOSPADM

## 2025-06-23 RX ORDER — METOPROLOL SUCCINATE 25 MG/1
25 TABLET, EXTENDED RELEASE ORAL DAILY
Status: DISCONTINUED | OUTPATIENT
Start: 2025-06-24 | End: 2025-06-25 | Stop reason: HOSPADM

## 2025-06-23 RX ORDER — GABAPENTIN 300 MG/1
300 CAPSULE ORAL 2 TIMES DAILY
Status: DISCONTINUED | OUTPATIENT
Start: 2025-06-23 | End: 2025-06-25 | Stop reason: HOSPADM

## 2025-06-23 RX ORDER — GLIPIZIDE 5 MG/1
5 TABLET ORAL
Status: DISCONTINUED | OUTPATIENT
Start: 2025-06-24 | End: 2025-06-25 | Stop reason: HOSPADM

## 2025-06-23 RX ORDER — BISACODYL 5 MG/1
5 TABLET, DELAYED RELEASE ORAL DAILY PRN
Status: DISCONTINUED | OUTPATIENT
Start: 2025-06-23 | End: 2025-06-25 | Stop reason: HOSPADM

## 2025-06-23 RX ORDER — LOSARTAN POTASSIUM 50 MG/1
50 TABLET ORAL DAILY
Status: DISCONTINUED | OUTPATIENT
Start: 2025-06-24 | End: 2025-06-25 | Stop reason: HOSPADM

## 2025-06-23 RX ORDER — ACETAMINOPHEN 500 MG
1000 TABLET ORAL ONCE
Status: COMPLETED | OUTPATIENT
Start: 2025-06-23 | End: 2025-06-23

## 2025-06-23 RX ORDER — FAMOTIDINE 20 MG/1
20 TABLET, FILM COATED ORAL 2 TIMES DAILY PRN
Status: DISCONTINUED | OUTPATIENT
Start: 2025-06-23 | End: 2025-06-25

## 2025-06-23 RX ORDER — ENOXAPARIN SODIUM 100 MG/ML
40 INJECTION SUBCUTANEOUS DAILY
Status: DISCONTINUED | OUTPATIENT
Start: 2025-06-24 | End: 2025-06-25 | Stop reason: HOSPADM

## 2025-06-23 RX ORDER — SODIUM CHLORIDE 9 MG/ML
INJECTION, SOLUTION INTRAVENOUS PRN
Status: DISCONTINUED | OUTPATIENT
Start: 2025-06-23 | End: 2025-06-25 | Stop reason: HOSPADM

## 2025-06-23 RX ORDER — ASPIRIN 81 MG/1
81 TABLET ORAL DAILY
Status: DISCONTINUED | OUTPATIENT
Start: 2025-06-24 | End: 2025-06-25 | Stop reason: HOSPADM

## 2025-06-23 RX ORDER — ACETAMINOPHEN 325 MG/1
650 TABLET ORAL EVERY 6 HOURS PRN
Status: DISCONTINUED | OUTPATIENT
Start: 2025-06-23 | End: 2025-06-25 | Stop reason: HOSPADM

## 2025-06-23 RX ORDER — POTASSIUM CHLORIDE 7.45 MG/ML
10 INJECTION INTRAVENOUS PRN
Status: DISCONTINUED | OUTPATIENT
Start: 2025-06-23 | End: 2025-06-25 | Stop reason: HOSPADM

## 2025-06-23 RX ADMIN — ATORVASTATIN CALCIUM 20 MG: 20 TABLET, FILM COATED ORAL at 23:22

## 2025-06-23 RX ADMIN — GABAPENTIN 300 MG: 300 CAPSULE ORAL at 23:22

## 2025-06-23 RX ADMIN — ACETAMINOPHEN 1000 MG: 500 TABLET ORAL at 20:01

## 2025-06-23 RX ADMIN — TRAZODONE HYDROCHLORIDE 100 MG: 100 TABLET ORAL at 23:24

## 2025-06-23 RX ADMIN — SODIUM CHLORIDE, PRESERVATIVE FREE 10 ML: 5 INJECTION INTRAVENOUS at 22:21

## 2025-06-23 RX ADMIN — WATER 1000 MG: 1 INJECTION INTRAMUSCULAR; INTRAVENOUS; SUBCUTANEOUS at 23:22

## 2025-06-23 ASSESSMENT — PAIN SCALES - GENERAL
PAINLEVEL_OUTOF10: 7
PAINLEVEL_OUTOF10: 7

## 2025-06-23 ASSESSMENT — PAIN DESCRIPTION - DESCRIPTORS: DESCRIPTORS: ACHING

## 2025-06-23 ASSESSMENT — PAIN - FUNCTIONAL ASSESSMENT: PAIN_FUNCTIONAL_ASSESSMENT: 0-10

## 2025-06-23 ASSESSMENT — PAIN DESCRIPTION - LOCATION: LOCATION: NECK
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
## 2025-06-23 NOTE — ED NOTES
Report given to WIL Kebede from ED.   Report method in person   The following was reviewed with receiving RN:   Current vital signs:  BP (!) 147/84   Pulse 98   Temp 98.6 °F (37 °C) (Oral)   Resp 17   Ht 1.676 m (5' 6\")   Wt 68.5 kg (151 lb)   LMP 01/01/1996   SpO2 96%   BMI 24.37 kg/m²                MEWS Score: 1     Any medication or safety alerts were reviewed. Any pending diagnostics and notifications were also reviewed, as well as any safety concerns or issues, abnormal labs, abnormal imaging, and abnormal assessment findings. Questions were answered.

## 2025-06-23 NOTE — ED PROVIDER NOTES
in next section)    External Documents Reviewed:  yes, past med records      3)  Treatment and Disposition    Her UA is contaminated no symptoms of UTI.  She does have these choreiform movements.  I am admitting her to the family medicine service for neurology consultation.  She can see psychiatry as well.  She is on suicide precautions.  I do not think admitted to the psychiatric unit just yet until she is seen by neurology.  Patient agrees with the plan       Procedures      DATA FOR LAB AND RADIOLOGY TESTS ORDERED BELOW ARE REVIEWED BY THE ED CLINICIAN:    RADIOLOGY: All x-rays, CT, MRI, and formal ultrasound images (except ED bedside ultrasound) are read by the radiologist, see reports below, unless otherwise noted in MDM or here.  Reports below are reviewed by myself.  CT HEAD WO CONTRAST   Final Result   No acute intracranial abnormality.             LABS: Lab orders shown below, the results are reviewed by myself, and all abnormals are listed below.  Labs Reviewed   COMPREHENSIVE METABOLIC PANEL - Abnormal; Notable for the following components:       Result Value    Glucose 107 (*)     Est, Glom Filt Rate 50 (*)     All other components within normal limits   URINALYSIS WITH REFLEX TO CULTURE - Abnormal; Notable for the following components:    Turbidity UA Cloudy (*)     Glucose, Ur MOD (*)     Urine Hgb SMALL (*)     Protein, UA TRACE (*)     Leukocyte Esterase, Urine SMALL (*)     All other components within normal limits   MICROSCOPIC URINALYSIS - Abnormal; Notable for the following components:    WBC, UA 10 TO 20 (*)     RBC, UA 10 TO 20 (*)     Casts UA 0 TO 2 (*)     Bacteria, UA MODERATE (*)     All other components within normal limits   CULTURE, URINE   CBC WITH AUTO DIFFERENTIAL   MAGNESIUM   ETHANOL   URINE DRUG SCREEN       Vitals Reviewed:    Vitals:    06/23/25 1541 06/23/25 1900   BP: (!) 160/90 (!) 147/84   Pulse: 99 98   Resp: 16 17   Temp: 97.9 °F (36.6 °C) 98.6 °F (37 °C)   TempSrc: Oral

## 2025-06-24 ENCOUNTER — APPOINTMENT (OUTPATIENT)
Dept: NEUROLOGY | Age: 65
DRG: 057 | End: 2025-06-24
Payer: COMMERCIAL

## 2025-06-24 ENCOUNTER — APPOINTMENT (OUTPATIENT)
Dept: MRI IMAGING | Age: 65
DRG: 057 | End: 2025-06-24
Payer: COMMERCIAL

## 2025-06-24 PROBLEM — G25.5 CHOREA: Status: ACTIVE | Noted: 2025-06-24

## 2025-06-24 LAB
25(OH)D3 SERPL-MCNC: 57 NG/ML (ref 30–100)
AMMONIA PLAS-SCNC: 16 UMOL/L (ref 11–51)
ANION GAP SERPL CALCULATED.3IONS-SCNC: 13 MMOL/L (ref 9–16)
BASOPHILS # BLD: 0.05 K/UL (ref 0–0.2)
BASOPHILS NFR BLD: 1 % (ref 0–2)
BUN SERPL-MCNC: 18 MG/DL (ref 8–23)
CALCIUM SERPL-MCNC: 9.7 MG/DL (ref 8.6–10.4)
CHLORIDE SERPL-SCNC: 108 MMOL/L (ref 98–107)
CO2 SERPL-SCNC: 22 MMOL/L (ref 20–31)
CREAT SERPL-MCNC: 1.2 MG/DL (ref 0.7–1.2)
EOSINOPHIL # BLD: 0.21 K/UL (ref 0–0.44)
EOSINOPHILS RELATIVE PERCENT: 4 % (ref 0–4)
ERYTHROCYTE [DISTWIDTH] IN BLOOD BY AUTOMATED COUNT: 13.9 % (ref 11.5–14.9)
FOLATE SERPL-MCNC: 9 NG/ML (ref 4.8–24.2)
GFR, ESTIMATED: 50 ML/MIN/1.73M2
GLUCOSE BLD-MCNC: 107 MG/DL (ref 65–105)
GLUCOSE BLD-MCNC: 115 MG/DL (ref 65–105)
GLUCOSE BLD-MCNC: 122 MG/DL (ref 65–105)
GLUCOSE BLD-MCNC: 144 MG/DL (ref 65–105)
GLUCOSE BLD-MCNC: 163 MG/DL (ref 65–105)
GLUCOSE SERPL-MCNC: 108 MG/DL (ref 74–99)
HCT VFR BLD AUTO: 44.4 % (ref 36–46)
HGB BLD-MCNC: 14.4 G/DL (ref 12–16)
IMM GRANULOCYTES # BLD AUTO: <0.03 K/UL (ref 0–0.3)
IMM GRANULOCYTES NFR BLD: 0 %
LYMPHOCYTES NFR BLD: 2.29 K/UL (ref 1.1–3.7)
LYMPHOCYTES RELATIVE PERCENT: 38 % (ref 24–44)
MCH RBC QN AUTO: 31 PG (ref 26–34)
MCHC RBC AUTO-ENTMCNC: 32.4 G/DL (ref 31–37)
MCV RBC AUTO: 95.7 FL (ref 80–100)
MICROORGANISM SPEC CULT: NORMAL
MONOCYTES NFR BLD: 0.49 K/UL (ref 0.1–1.2)
MONOCYTES NFR BLD: 8 % (ref 3–12)
NEUTROPHILS NFR BLD: 49 % (ref 36–66)
NEUTS SEG NFR BLD: 2.95 K/UL (ref 1.5–8.1)
NRBC BLD-RTO: 0 PER 100 WBC
PLATELET # BLD AUTO: 177 K/UL (ref 150–450)
PMV BLD AUTO: 10.6 FL (ref 8–13.5)
POTASSIUM SERPL-SCNC: 4.9 MMOL/L (ref 3.7–5.3)
RBC # BLD AUTO: 4.64 M/UL (ref 3.95–5.11)
SODIUM SERPL-SCNC: 143 MMOL/L (ref 136–145)
SPECIMEN DESCRIPTION: NORMAL
T PALLIDUM AB SER QL IA: NONREACTIVE
TSH SERPL DL<=0.05 MIU/L-ACNC: 1.27 UIU/ML (ref 0.27–4.2)
VIT B12 SERPL-MCNC: 448 PG/ML (ref 232–1245)
WBC OTHER # BLD: 6 K/UL (ref 3.5–11)

## 2025-06-24 PROCEDURE — 6370000000 HC RX 637 (ALT 250 FOR IP): Performed by: STUDENT IN AN ORGANIZED HEALTH CARE EDUCATION/TRAINING PROGRAM

## 2025-06-24 PROCEDURE — 84425 ASSAY OF VITAMIN B-1: CPT

## 2025-06-24 PROCEDURE — 36415 COLL VENOUS BLD VENIPUNCTURE: CPT

## 2025-06-24 PROCEDURE — 6370000000 HC RX 637 (ALT 250 FOR IP): Performed by: PSYCHIATRY & NEUROLOGY

## 2025-06-24 PROCEDURE — 86780 TREPONEMA PALLIDUM: CPT

## 2025-06-24 PROCEDURE — 82306 VITAMIN D 25 HYDROXY: CPT

## 2025-06-24 PROCEDURE — 94640 AIRWAY INHALATION TREATMENT: CPT

## 2025-06-24 PROCEDURE — 85025 COMPLETE CBC W/AUTO DIFF WBC: CPT

## 2025-06-24 PROCEDURE — 95816 EEG AWAKE AND DROWSY: CPT

## 2025-06-24 PROCEDURE — 94760 N-INVAS EAR/PLS OXIMETRY 1: CPT

## 2025-06-24 PROCEDURE — 6360000002 HC RX W HCPCS: Performed by: STUDENT IN AN ORGANIZED HEALTH CARE EDUCATION/TRAINING PROGRAM

## 2025-06-24 PROCEDURE — 70553 MRI BRAIN STEM W/O & W/DYE: CPT

## 2025-06-24 PROCEDURE — 99223 1ST HOSP IP/OBS HIGH 75: CPT | Performed by: STUDENT IN AN ORGANIZED HEALTH CARE EDUCATION/TRAINING PROGRAM

## 2025-06-24 PROCEDURE — 2500000003 HC RX 250 WO HCPCS: Performed by: STUDENT IN AN ORGANIZED HEALTH CARE EDUCATION/TRAINING PROGRAM

## 2025-06-24 PROCEDURE — 1200000000 HC SEMI PRIVATE

## 2025-06-24 PROCEDURE — 82525 ASSAY OF COPPER: CPT

## 2025-06-24 PROCEDURE — 82607 VITAMIN B-12: CPT

## 2025-06-24 PROCEDURE — 82746 ASSAY OF FOLIC ACID SERUM: CPT

## 2025-06-24 PROCEDURE — 99221 1ST HOSP IP/OBS SF/LOW 40: CPT | Performed by: NURSE PRACTITIONER

## 2025-06-24 PROCEDURE — 2500000003 HC RX 250 WO HCPCS: Performed by: PSYCHIATRY & NEUROLOGY

## 2025-06-24 PROCEDURE — 6360000004 HC RX CONTRAST MEDICATION: Performed by: PSYCHIATRY & NEUROLOGY

## 2025-06-24 PROCEDURE — 82140 ASSAY OF AMMONIA: CPT

## 2025-06-24 PROCEDURE — A9579 GAD-BASE MR CONTRAST NOS,1ML: HCPCS | Performed by: PSYCHIATRY & NEUROLOGY

## 2025-06-24 PROCEDURE — 94664 DEMO&/EVAL PT USE INHALER: CPT

## 2025-06-24 PROCEDURE — 84443 ASSAY THYROID STIM HORMONE: CPT

## 2025-06-24 PROCEDURE — 99222 1ST HOSP IP/OBS MODERATE 55: CPT | Performed by: PSYCHIATRY & NEUROLOGY

## 2025-06-24 PROCEDURE — 80048 BASIC METABOLIC PNL TOTAL CA: CPT

## 2025-06-24 RX ORDER — GADOTERIDOL 279.3 MG/ML
15 INJECTION INTRAVENOUS
Status: COMPLETED | OUTPATIENT
Start: 2025-06-24 | End: 2025-06-24

## 2025-06-24 RX ORDER — NICOTINE 21 MG/24HR
1 PATCH, TRANSDERMAL 24 HOURS TRANSDERMAL DAILY
Status: DISCONTINUED | OUTPATIENT
Start: 2025-06-24 | End: 2025-06-25 | Stop reason: HOSPADM

## 2025-06-24 RX ORDER — HYDRALAZINE HYDROCHLORIDE 20 MG/ML
10 INJECTION INTRAMUSCULAR; INTRAVENOUS EVERY 4 HOURS PRN
Status: DISCONTINUED | OUTPATIENT
Start: 2025-06-24 | End: 2025-06-25 | Stop reason: HOSPADM

## 2025-06-24 RX ORDER — ALBUTEROL SULFATE 0.83 MG/ML
2.5 SOLUTION RESPIRATORY (INHALATION) EVERY 4 HOURS PRN
Status: DISCONTINUED | OUTPATIENT
Start: 2025-06-24 | End: 2025-06-25 | Stop reason: HOSPADM

## 2025-06-24 RX ORDER — LORAZEPAM 2 MG/ML
0.5 INJECTION INTRAMUSCULAR ONCE
Status: DISCONTINUED | OUTPATIENT
Start: 2025-06-24 | End: 2025-06-25 | Stop reason: HOSPADM

## 2025-06-24 RX ORDER — BENZTROPINE MESYLATE 0.5 MG/1
0.5 TABLET ORAL 2 TIMES DAILY
Status: DISCONTINUED | OUTPATIENT
Start: 2025-06-24 | End: 2025-06-25 | Stop reason: HOSPADM

## 2025-06-24 RX ORDER — SODIUM CHLORIDE 0.9 % (FLUSH) 0.9 %
10 SYRINGE (ML) INJECTION PRN
Status: DISCONTINUED | OUTPATIENT
Start: 2025-06-24 | End: 2025-06-25 | Stop reason: HOSPADM

## 2025-06-24 RX ADMIN — ACETAMINOPHEN 650 MG: 325 TABLET ORAL at 18:36

## 2025-06-24 RX ADMIN — GABAPENTIN 300 MG: 300 CAPSULE ORAL at 19:18

## 2025-06-24 RX ADMIN — WATER 1000 MG: 1 INJECTION INTRAMUSCULAR; INTRAVENOUS; SUBCUTANEOUS at 23:55

## 2025-06-24 RX ADMIN — ATORVASTATIN CALCIUM 20 MG: 20 TABLET, FILM COATED ORAL at 19:19

## 2025-06-24 RX ADMIN — GADOTERIDOL 15 ML: 279.3 INJECTION, SOLUTION INTRAVENOUS at 16:15

## 2025-06-24 RX ADMIN — ENOXAPARIN SODIUM 40 MG: 100 INJECTION SUBCUTANEOUS at 08:17

## 2025-06-24 RX ADMIN — GLIPIZIDE 5 MG: 5 TABLET ORAL at 06:18

## 2025-06-24 RX ADMIN — LOSARTAN POTASSIUM 50 MG: 50 TABLET, FILM COATED ORAL at 08:18

## 2025-06-24 RX ADMIN — SODIUM CHLORIDE, PRESERVATIVE FREE 10 ML: 5 INJECTION INTRAVENOUS at 19:19

## 2025-06-24 RX ADMIN — BENZTROPINE MESYLATE 0.5 MG: 0.5 TABLET ORAL at 13:53

## 2025-06-24 RX ADMIN — ASPIRIN 81 MG: 81 TABLET, DELAYED RELEASE ORAL at 08:17

## 2025-06-24 RX ADMIN — TIOTROPIUM BROMIDE INHALATION SPRAY 5 MCG: 3.12 SPRAY, METERED RESPIRATORY (INHALATION) at 08:07

## 2025-06-24 RX ADMIN — GABAPENTIN 300 MG: 300 CAPSULE ORAL at 08:17

## 2025-06-24 RX ADMIN — TRAZODONE HYDROCHLORIDE 100 MG: 100 TABLET ORAL at 19:19

## 2025-06-24 RX ADMIN — METOPROLOL SUCCINATE 25 MG: 25 TABLET, EXTENDED RELEASE ORAL at 19:19

## 2025-06-24 RX ADMIN — BENZTROPINE MESYLATE 0.5 MG: 0.5 TABLET ORAL at 19:19

## 2025-06-24 RX ADMIN — SODIUM CHLORIDE, PRESERVATIVE FREE 10 ML: 5 INJECTION INTRAVENOUS at 16:15

## 2025-06-24 ASSESSMENT — ENCOUNTER SYMPTOMS
ABDOMINAL PAIN: 0
SHORTNESS OF BREATH: 0

## 2025-06-24 ASSESSMENT — PAIN DESCRIPTION - DESCRIPTORS
DESCRIPTORS: ACHING;DISCOMFORT;DULL
DESCRIPTORS: ACHING

## 2025-06-24 ASSESSMENT — PAIN SCALES - GENERAL
PAINLEVEL_OUTOF10: 6
PAINLEVEL_OUTOF10: 7

## 2025-06-24 ASSESSMENT — PAIN DESCRIPTION - LOCATION
LOCATION: NECK
LOCATION: HEAD

## 2025-06-24 NOTE — CONSULTS
Department of Psychiatry  Consult Service   Psychiatric Assessment        REASON FOR CONSULT: Suicidal ideation    CONSULTING PHYSICIAN: Ion Harmon    History obtained from: patient, EMR, treatment team    HISTORY OF PRESENT ILLNESS:          The patient is a 65 y.o. female with significant psychiatric history of MDD who is admitted medically for assessment of involuntary movements.  She initially presented to the ED endorsing suicidal ideation with thoughts to overdose.  Neurology has been consulted to assess for ataxia/chorea.    On assessment, patient is resting in bed.  She is awake and alert.  Thought process linear and goal-directed.  She appears disheveled with poor grooming and hygiene.  Patient identifies that her poor self-care has been leading to increased conflict in her relationship with her significant other.  They have been arguing her nearly daily basis.  She states that she is feeling hopeless and helpless.  She has very little energy and motivation.  Mood has been very low, consistently, for the last few weeks.  She reports increased severity in her suicidal thoughts, now has a plan and believes that she would act on these thoughts to overdose.  She reports a previous overdose in 2014.  Noted that she does have a previous inpatient psychiatric hospitalization to [facility] in 2014.  There are no notes from this encounter, but she was discharged on fluoxetine and risperidone at that time.    Patient reports she is currently linked with outpatient mental health care through [facility] and sees both a therapist and a psychiatrist, Dr. Richard.  Her current psychotropic regimen includes Fetzima and hydroxyzine.  Fetzima does carry some risk for involuntary movements of the face, neck and back.  For now would recommend holding Fetzima and would appreciate neurology input.    Patient is actively suicidal and does not feel safe from self.  She would benefit from admission to [facility] and is agreeable to admission once

## 2025-06-24 NOTE — ED NOTES
A/O X 3. Skin warm and dry. Respirations quiet and easy. Lungs diminished t/o posteriorly. Heart sounds regular. Bowel sounds present x 4 quadrants, abdomen is soft, non-tender. Has some involuntary movements of her hands and mouth. Hx of carpal tunnel and wears bilateral velcro wrist splints. C/o chronic neck pain rated as a \"7\" and Dr. Cuello informed and Tylenol ordered. Resting quietly.

## 2025-06-24 NOTE — PLAN OF CARE
Problem: Chronic Conditions and Co-morbidities  Goal: Patient's chronic conditions and co-morbidity symptoms are monitored and maintained or improved  6/24/2025 1233 by Sary Richard  Outcome: Progressing  6/24/2025 0404 by Mary Goncalves RN  Outcome: Progressing  Flowsheets (Taken 6/23/2025 2155)  Care Plan - Patient's Chronic Conditions and Co-Morbidity Symptoms are Monitored and Maintained or Improved: Monitor and assess patient's chronic conditions and comorbid symptoms for stability, deterioration, or improvement     Problem: Discharge Planning  Goal: Discharge to home or other facility with appropriate resources  6/24/2025 1233 by Sary Richard  Outcome: Progressing  6/24/2025 0404 by Mary Goncalves RN  Outcome: Progressing  Flowsheets  Taken 6/23/2025 2155  Discharge to home or other facility with appropriate resources: Identify barriers to discharge with patient and caregiver  Taken 6/23/2025 2149  Discharge to home or other facility with appropriate resources: Identify barriers to discharge with patient and caregiver     Problem: Pain  Goal: Verbalizes/displays adequate comfort level or baseline comfort level  6/24/2025 1233 by Sary Richard  Outcome: Progressing  6/24/2025 0404 by Mary Goncalves RN  Outcome: Progressing     Problem: Risk for Elopement  Goal: Patient will not exit the unit/facility without proper excort  6/24/2025 1233 by Sary Richard  Outcome: Progressing  Flowsheets (Taken 6/24/2025 0800)  Nursing Interventions for Elopement Risk: Assist with personal care needs such as toileting, eating, dressing, as needed to reduce the risk of wandering  6/24/2025 0404 by Mary Goncalves, RN  Outcome: Progressing  Flowsheets  Taken 6/23/2025 2155  Nursing Interventions for Elopement Risk: Assist with personal care needs such as toileting, eating, dressing, as needed to reduce the risk of wandering  Taken 6/23/2025 2100  Nursing Interventions for Elopement Risk: Assist with personal care needs such as

## 2025-06-24 NOTE — PLAN OF CARE
Problem: Chronic Conditions and Co-morbidities  Goal: Patient's chronic conditions and co-morbidity symptoms are monitored and maintained or improved  Outcome: Progressing  Flowsheets (Taken 6/23/2025 2155)  Care Plan - Patient's Chronic Conditions and Co-Morbidity Symptoms are Monitored and Maintained or Improved: Monitor and assess patient's chronic conditions and comorbid symptoms for stability, deterioration, or improvement     Problem: Discharge Planning  Goal: Discharge to home or other facility with appropriate resources  Outcome: Progressing  Flowsheets  Taken 6/23/2025 2155  Discharge to home or other facility with appropriate resources: Identify barriers to discharge with patient and caregiver  Taken 6/23/2025 2149  Discharge to home or other facility with appropriate resources: Identify barriers to discharge with patient and caregiver     Problem: Pain  Goal: Verbalizes/displays adequate comfort level or baseline comfort level  Outcome: Progressing     Problem: Risk for Elopement  Goal: Patient will not exit the unit/facility without proper excort  Outcome: Progressing  Flowsheets  Taken 6/23/2025 2155  Nursing Interventions for Elopement Risk: Assist with personal care needs such as toileting, eating, dressing, as needed to reduce the risk of wandering  Taken 6/23/2025 2100  Nursing Interventions for Elopement Risk: Assist with personal care needs such as toileting, eating, dressing, as needed to reduce the risk of wandering     Problem: Self Harm/Suicidality  Goal: Will have no self-injury during hospital stay  Description: INTERVENTIONS:  1.  Ensure constant observer at bedside with Q15M safety checks  2.  Maintain a safe environment  3.  Secure patient belongings  4.  Ensure family/visitors adhere to safety recommendations  5.  Ensure safety tray has been added to patient's diet order  6.  Every shift and PRN: Re-assess suicidal risk via Frequent Screener    Outcome: Progressing  Flowsheets (Taken

## 2025-06-24 NOTE — ED NOTES
Patient requesting that I call her boyfriend and inform him of plan of care and that patient to be admitted to medical floor and spoke with David on the phone and he verbalized an understanding.

## 2025-06-24 NOTE — CARE COORDINATION
Case Management Assessment  Initial Evaluation    Date/Time of Evaluation: 6/24/2025 10:34 AM  Assessment Completed by: Yoli Crook RN    If patient is discharged prior to next notation, then this note serves as note for discharge by case management.    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 Yes    Chart Reviewed: Yes      History Provided by: Patient  Patient Orientation: Alert and Oriented    Patient Cognition: Alert    Hospitalization in the last 30 days (Readmission):  No    If yes, Readmission Assessment in CM Navigator will be completed.    Advance Directives:      Code Status: Full Code   Patient's Primary Decision Maker is: Legal Next of Kin    Primary Decision Maker: David Villagomez - Other - [phone]    Discharge Planning:    Patient lives with: Spouse/Significant Other Type of Home: Trailer/Mobile Home  Primary Care Giver: Self  Patient Support Systems include: Spouse/Significant Other, Other (Comment) (Follows at [facility] [address]   Current Financial resources: Medicare  Current community resources: Psychiatric Treatment (Follows at [facility] [address]  Current services prior to admission: Durable Medical Equipment            Current DME: Other (Comment) ([employer] for [employer]            Type of Home Care services:  None    ADLS  Prior functional level: Independent in ADLs/IADLs  Current functional level: Independent in ADLs/IADLs    PT AM-PAC:   /24  OT AM-PAC:   /24    Family can provide assistance at DC: No  Would you like Case Management to discuss the discharge plan with any other family members/significant others, and if so, who?

## 2025-06-24 NOTE — ED NOTES
Admission Dx: SUICIDAL    Pts Chief Complaints on Arrival: SUICIDAL    ADL's - Partial assistance    Pending Diagnostics:  N/A    Residence PTA: single story home    Special Considerations/Circumstances:  N/A    Vitals: Current vital signs:  BP (!) 169/74   Pulse 99   Temp 98.1 °F (36.7 °C) (Oral)   Resp 16   Ht 1.676 m (5' 6\")   Wt 68.5 kg (151 lb)   LMP 01/01/1996   SpO2 96%   BMI 24.37 kg/m²                MEWS Score: 1

## 2025-06-24 NOTE — PLAN OF CARE
Please have patient or POA complete online MRI screening form in [facility]. The patient will need to be in hospital gown for their MRI. If there are any questions please call [phone]!  Thanks!
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
## 2025-06-24 NOTE — CONSULTS
NEPHROLOGY CONSULT     Patient :  Annalisa Sams; 65 y.o. MRN# [id number]  Location:  2078/2078-01  Attending:  Ion Harmon MD  Admit Date:  6/23/2025   Hospital Day: 1      Reason for Consult: CKD, nephrology clearance for MRI      Chief Complaint: Suicidal ideation  History Obtained From:  patient    History of Present Illness:    This is a 65 y.o. female  with hx of HTN 6-7 yrs, Smoking 15 cig for 30 yrs, PVD s/p Bypass surgery on left femoral artery, Hyperlipidemia,CKD stage 3a, with baseline serum creatinine 1.2 to 1.3 mg/dL  Patient presented to the hospital with complaints of suicidal ideation patient was advised to go to the hospital.  Patient states that she she wanted to take bunch of pills but she did not take it.  Urine drug screen and alcohol levels are within normal limits  Patient has been complaining of involuntary abnormal upper extremity and face and neck movements.  Psychiatry and neurology consulted.  Nephrology consulted for history of CKD in reference to clearance for MRI with contrast  Patient EGFR is 50 mL/min      Past Medical History:        Diagnosis Date    AA (aortic aneurysm)     MILD SUPRA-RENAL    Anxiety     Aortic aneurysm     MILD SUPRA-RENAL    Atherosclerosis of native artery of both lower extremities with intermittent claudication 05/16/2022    Blood clot in vein     Bronchitis     Cerebral infarction (HCC)     several \"mini strokes\" on Feb 14 (about 2017?)- previously saw Dr. Cannon (neurology)    Cervical cancer (HCC)     Cervix no chemo or radiation    CKD (chronic kidney disease) stage 3, GFR 30-59 ml/min ([facility])     Dr. Penaloza- nephrologist    COPD (chronic obstructive pulmonary disease) (HCC)     Depression     Diabetes mellitus (HCC)     Dizziness     Eczema     Hx of blood clots     Hyperlipidemia     Hyperlipidemia     Hypertension     Insomnia     Limb ischemia     Loose, teeth     Nephrolith     Patient denies    MAMIE (obstructive sleep apnea)     \"years ago had

## 2025-06-24 NOTE — PLAN OF CARE
Problem: Chronic Conditions and Co-morbidities  Goal: Patient's chronic conditions and co-morbidity symptoms are monitored and maintained or improved  6/24/2025 1932 by Nimco Jj RN  Outcome: Progressing  Flowsheets (Taken 6/24/2025 1932)  Care Plan - Patient's Chronic Conditions and Co-Morbidity Symptoms are Monitored and Maintained or Improved:   Monitor and assess patient's chronic conditions and comorbid symptoms for stability, deterioration, or improvement   Collaborate with multidisciplinary team to address chronic and comorbid conditions and prevent exacerbation or deterioration   Update acute care plan with appropriate goals if chronic or comorbid symptoms are exacerbated and prevent overall improvement and discharge     Problem: Discharge Planning  Goal: Discharge to home or other facility with appropriate resources  6/24/2025 1932 by Nimco Jj RN  Outcome: Progressing  Flowsheets (Taken 6/24/2025 1932)  Discharge to home or other facility with appropriate resources:   Identify barriers to discharge with patient and caregiver   Arrange for needed discharge resources and transportation as appropriate   Identify discharge learning needs (meds, wound care, etc)     Problem: Pain  Goal: Verbalizes/displays adequate comfort level or baseline comfort level  6/24/2025 1932 by Nimco Jj RN  Outcome: Progressing  Flowsheets (Taken 6/24/2025 1932)  Verbalizes/displays adequate comfort level or baseline comfort level:   Assess pain using appropriate pain scale   Encourage patient to monitor pain and request assistance   Administer analgesics based on type and severity of pain and evaluate response   Implement non-pharmacological measures as appropriate and evaluate response     Problem: Risk for Elopement  Goal: Patient will not exit the unit/facility without proper excort  6/24/2025 1932 by Nimco Jj, RN  Outcome: Progressing  Flowsheets (Taken 6/24/2025 1932)  Nursing

## 2025-06-24 NOTE — H&P
[facility]  Family Medicine      History & Physical              Date:   6/24/2025  Patient name:  Annalisa Sams  Date of admission:  6/23/2025  3:26 PM  MRN:   [id number]  YOB: 1960    CHIEF COMPLAINT:       Chief Complaint   Patient presents with    Suicidal         ASSESSMENT/PLAN       Hospital Problems           Last Modified POA    * (Principal) Movement disorder 6/23/2025 Yes    Depression with suicidal ideation 6/24/2025 Yes    Chronic kidney disease, stage III (moderate) ([facility]) 6/23/2025 Yes    Suicidal ideation 6/24/2025 Yes    Type 2 diabetes mellitus with chronic kidney disease 6/23/2025 Yes    Pulmonary emphysema, unspecified emphysema type ([facility]) 6/23/2025 Yes    Acute cystitis with hematuria 6/23/2025 Yes         Neurology consulted for movement disorder, MRI brain, EEG, labs pending  Psychiatry consulted for suicidal ideation, recommend holding Fetzima, continuing trazodone  Rocephin 1 g daily for UTI  BP elevated, resume home dose Toprol and losartan, start hydralazine as needed  Sitter at bedside, suicidal precautions  Continue glipizide, hold Farxiga, cover with sliding scale  Continue Incruse, albuterol as needed          Full Code   ADULT DIET; Regular; Safety Tray; Safety Tray (Disposables)   DVT: Lovenox       The severity of this patient's signs and symptoms (specify suicidal ideation, movement disorder) indicate the need for an inpatient admission.      Above plan discussed with the patient    Consultations:   Consults: IP CONSULT TO FAMILY MEDICINE  IP CONSULT TO SOCIAL WORK  IP CONSULT TO NEUROLOGY  IP CONSULT TO PSYCHIATRY  IP CONSULT TO NEPHROLOGY      HPI:       History Obtained From:  Patient and chart review.    The patient is a 65 y.o.  female who presented with suicidal ideation which has been worsening, exacerbated by difficulties at home.  Patient has a history of depression and has been on Fetzima for many years.  In the ER it was noted the patient had

## 2025-06-24 NOTE — ACP (ADVANCE CARE PLANNING)
Referred individual to Provider for additional questions/concerns   [] Advised patient/agent/surrogate to review completed ACP document and update if needed with changes in condition, patient preferences or care setting    [] This note routed to one or more involved healthcare providers    Pt did NOT want any additional information. Writer did inform her that Pt. Would be a full code & she states Understanding.

## 2025-06-25 ENCOUNTER — HOSPITAL ENCOUNTER (INPATIENT)
Age: 65
LOS: 2 days | Discharge: HOME | DRG: 885 | End: 2025-06-27
Attending: PSYCHIATRY & NEUROLOGY | Admitting: PSYCHIATRY & NEUROLOGY
Payer: COMMERCIAL

## 2025-06-25 VITALS
RESPIRATION RATE: 16 BRPM | OXYGEN SATURATION: 94 % | DIASTOLIC BLOOD PRESSURE: 70 MMHG | TEMPERATURE: 98.5 F | BODY MASS INDEX: 24.27 KG/M2 | HEART RATE: 77 BPM | SYSTOLIC BLOOD PRESSURE: 138 MMHG | HEIGHT: 66 IN | WEIGHT: 151 LBS

## 2025-06-25 DIAGNOSIS — G62.9 NEUROPATHY: ICD-10-CM

## 2025-06-25 DIAGNOSIS — I10 ESSENTIAL HYPERTENSION: ICD-10-CM

## 2025-06-25 DIAGNOSIS — F17.200 SMOKER: ICD-10-CM

## 2025-06-25 DIAGNOSIS — R05.3 CHRONIC COUGH: ICD-10-CM

## 2025-06-25 LAB
ANION GAP SERPL CALCULATED.3IONS-SCNC: 12 MMOL/L (ref 9–16)
BASOPHILS # BLD: 0.05 K/UL (ref 0–0.2)
BASOPHILS NFR BLD: 1 % (ref 0–2)
BUN SERPL-MCNC: 19 MG/DL (ref 8–23)
CALCIUM SERPL-MCNC: 9.6 MG/DL (ref 8.6–10.4)
CHLORIDE SERPL-SCNC: 104 MMOL/L (ref 98–107)
CO2 SERPL-SCNC: 23 MMOL/L (ref 20–31)
CREAT SERPL-MCNC: 1.1 MG/DL (ref 0.7–1.2)
EOSINOPHIL # BLD: 0.26 K/UL (ref 0–0.44)
EOSINOPHILS RELATIVE PERCENT: 4 % (ref 0–4)
ERYTHROCYTE [DISTWIDTH] IN BLOOD BY AUTOMATED COUNT: 13.6 % (ref 11.5–14.9)
GFR, ESTIMATED: 55 ML/MIN/1.73M2
GLUCOSE BLD-MCNC: 139 MG/DL (ref 65–105)
GLUCOSE BLD-MCNC: 207 MG/DL (ref 65–105)
GLUCOSE SERPL-MCNC: 117 MG/DL (ref 74–99)
HCT VFR BLD AUTO: 41.3 % (ref 36–46)
HGB BLD-MCNC: 13.5 G/DL (ref 12–16)
IMM GRANULOCYTES # BLD AUTO: 0.03 K/UL (ref 0–0.3)
IMM GRANULOCYTES NFR BLD: 0 %
LYMPHOCYTES NFR BLD: 2.41 K/UL (ref 1.1–3.7)
LYMPHOCYTES RELATIVE PERCENT: 33 % (ref 24–44)
MCH RBC QN AUTO: 31.1 PG (ref 26–34)
MCHC RBC AUTO-ENTMCNC: 32.7 G/DL (ref 31–37)
MCV RBC AUTO: 95.2 FL (ref 80–100)
MONOCYTES NFR BLD: 0.85 K/UL (ref 0.1–1.2)
MONOCYTES NFR BLD: 12 % (ref 3–12)
NEUTROPHILS NFR BLD: 50 % (ref 36–66)
NEUTS SEG NFR BLD: 3.81 K/UL (ref 1.5–8.1)
NRBC BLD-RTO: 0 PER 100 WBC
PLATELET # BLD AUTO: 178 K/UL (ref 150–450)
PMV BLD AUTO: 10.4 FL (ref 8–13.5)
POTASSIUM SERPL-SCNC: 4.3 MMOL/L (ref 3.7–5.3)
RBC # BLD AUTO: 4.34 M/UL (ref 3.95–5.11)
SODIUM SERPL-SCNC: 139 MMOL/L (ref 136–145)
WBC OTHER # BLD: 7.4 K/UL (ref 3.5–11)

## 2025-06-25 PROCEDURE — 82947 ASSAY GLUCOSE BLOOD QUANT: CPT

## 2025-06-25 PROCEDURE — 99232 SBSQ HOSP IP/OBS MODERATE 35: CPT | Performed by: PSYCHIATRY & NEUROLOGY

## 2025-06-25 PROCEDURE — 6360000002 HC RX W HCPCS: Performed by: STUDENT IN AN ORGANIZED HEALTH CARE EDUCATION/TRAINING PROGRAM

## 2025-06-25 PROCEDURE — 1240000000 HC EMOTIONAL WELLNESS R&B

## 2025-06-25 PROCEDURE — GZ56ZZZ INDIVIDUAL PSYCHOTHERAPY, SUPPORTIVE: ICD-10-PCS | Performed by: PSYCHIATRY & NEUROLOGY

## 2025-06-25 PROCEDURE — 6370000000 HC RX 637 (ALT 250 FOR IP): Performed by: STUDENT IN AN ORGANIZED HEALTH CARE EDUCATION/TRAINING PROGRAM

## 2025-06-25 PROCEDURE — 94761 N-INVAS EAR/PLS OXIMETRY MLT: CPT

## 2025-06-25 PROCEDURE — 2500000003 HC RX 250 WO HCPCS: Performed by: STUDENT IN AN ORGANIZED HEALTH CARE EDUCATION/TRAINING PROGRAM

## 2025-06-25 PROCEDURE — 6370000000 HC RX 637 (ALT 250 FOR IP): Performed by: PSYCHIATRY & NEUROLOGY

## 2025-06-25 PROCEDURE — 99238 HOSP IP/OBS DSCHRG MGMT 30/<: CPT | Performed by: STUDENT IN AN ORGANIZED HEALTH CARE EDUCATION/TRAINING PROGRAM

## 2025-06-25 PROCEDURE — 80048 BASIC METABOLIC PNL TOTAL CA: CPT

## 2025-06-25 PROCEDURE — 85025 COMPLETE CBC W/AUTO DIFF WBC: CPT

## 2025-06-25 PROCEDURE — 36415 COLL VENOUS BLD VENIPUNCTURE: CPT

## 2025-06-25 PROCEDURE — 83036 HEMOGLOBIN GLYCOSYLATED A1C: CPT

## 2025-06-25 PROCEDURE — 6370000000 HC RX 637 (ALT 250 FOR IP): Performed by: NURSE PRACTITIONER

## 2025-06-25 PROCEDURE — 94640 AIRWAY INHALATION TREATMENT: CPT

## 2025-06-25 RX ORDER — DEXTROSE MONOHYDRATE 100 MG/ML
INJECTION, SOLUTION INTRAVENOUS CONTINUOUS PRN
Status: DISCONTINUED | OUTPATIENT
Start: 2025-06-25 | End: 2025-06-27 | Stop reason: HOSPADM

## 2025-06-25 RX ORDER — BENZTROPINE MESYLATE 0.5 MG/1
0.5 TABLET ORAL 2 TIMES DAILY
Status: DISCONTINUED | OUTPATIENT
Start: 2025-06-25 | End: 2025-06-27 | Stop reason: HOSPADM

## 2025-06-25 RX ORDER — GLIPIZIDE 5 MG/1
5 TABLET ORAL
Status: DISCONTINUED | OUTPATIENT
Start: 2025-06-26 | End: 2025-06-27 | Stop reason: HOSPADM

## 2025-06-25 RX ORDER — FAMOTIDINE 20 MG/1
20 TABLET, FILM COATED ORAL DAILY
Status: DISCONTINUED | OUTPATIENT
Start: 2025-06-25 | End: 2025-06-25 | Stop reason: HOSPADM

## 2025-06-25 RX ORDER — INSULIN LISPRO 100 [IU]/ML
0-8 INJECTION, SOLUTION INTRAVENOUS; SUBCUTANEOUS
Status: DISCONTINUED | OUTPATIENT
Start: 2025-06-25 | End: 2025-06-27 | Stop reason: HOSPADM

## 2025-06-25 RX ORDER — GABAPENTIN 300 MG/1
300 CAPSULE ORAL 2 TIMES DAILY
Status: DISCONTINUED | OUTPATIENT
Start: 2025-06-25 | End: 2025-06-27 | Stop reason: HOSPADM

## 2025-06-25 RX ORDER — POLYETHYLENE GLYCOL 3350 17 G/17G
17 POWDER, FOR SOLUTION ORAL DAILY PRN
Status: DISCONTINUED | OUTPATIENT
Start: 2025-06-25 | End: 2025-06-26

## 2025-06-25 RX ORDER — ASPIRIN 81 MG/1
81 TABLET ORAL DAILY
Status: DISCONTINUED | OUTPATIENT
Start: 2025-06-26 | End: 2025-06-27 | Stop reason: HOSPADM

## 2025-06-25 RX ORDER — BENZTROPINE MESYLATE 0.5 MG/1
0.5 TABLET ORAL 2 TIMES DAILY
Qty: 60 TABLET | Refills: 3 | Status: ON HOLD | OUTPATIENT
Start: 2025-06-25 | End: 2025-06-27

## 2025-06-25 RX ORDER — ATORVASTATIN CALCIUM 20 MG/1
20 TABLET, FILM COATED ORAL NIGHTLY
Status: DISCONTINUED | OUTPATIENT
Start: 2025-06-25 | End: 2025-06-27 | Stop reason: HOSPADM

## 2025-06-25 RX ORDER — NICOTINE 21 MG/24HR
1 PATCH, TRANSDERMAL 24 HOURS TRANSDERMAL DAILY
Status: DISCONTINUED | OUTPATIENT
Start: 2025-06-26 | End: 2025-06-27 | Stop reason: HOSPADM

## 2025-06-25 RX ORDER — INSULIN GLARGINE 100 [IU]/ML
8 INJECTION, SOLUTION SUBCUTANEOUS DAILY
Status: DISCONTINUED | OUTPATIENT
Start: 2025-06-25 | End: 2025-06-25

## 2025-06-25 RX ORDER — IBUPROFEN 400 MG/1
400 TABLET, FILM COATED ORAL EVERY 6 HOURS PRN
Status: DISCONTINUED | OUTPATIENT
Start: 2025-06-25 | End: 2025-06-27 | Stop reason: HOSPADM

## 2025-06-25 RX ORDER — MAGNESIUM HYDROXIDE/ALUMINUM HYDROXICE/SIMETHICONE 120; 1200; 1200 MG/30ML; MG/30ML; MG/30ML
30 SUSPENSION ORAL EVERY 6 HOURS PRN
Status: DISCONTINUED | OUTPATIENT
Start: 2025-06-25 | End: 2025-06-27 | Stop reason: HOSPADM

## 2025-06-25 RX ORDER — FAMOTIDINE 20 MG/1
20 TABLET, FILM COATED ORAL DAILY
Status: DISCONTINUED | OUTPATIENT
Start: 2025-06-26 | End: 2025-06-27 | Stop reason: HOSPADM

## 2025-06-25 RX ORDER — HYDROXYZINE HYDROCHLORIDE 50 MG/1
50 TABLET, FILM COATED ORAL 3 TIMES DAILY PRN
Status: DISCONTINUED | OUTPATIENT
Start: 2025-06-25 | End: 2025-06-27 | Stop reason: HOSPADM

## 2025-06-25 RX ORDER — ACETAMINOPHEN 325 MG/1
650 TABLET ORAL EVERY 4 HOURS PRN
Status: DISCONTINUED | OUTPATIENT
Start: 2025-06-25 | End: 2025-06-27 | Stop reason: HOSPADM

## 2025-06-25 RX ORDER — LOSARTAN POTASSIUM 50 MG/1
50 TABLET ORAL DAILY
Status: DISCONTINUED | OUTPATIENT
Start: 2025-06-25 | End: 2025-06-27 | Stop reason: HOSPADM

## 2025-06-25 RX ORDER — TRAZODONE HYDROCHLORIDE 100 MG/1
100 TABLET ORAL NIGHTLY PRN
Status: DISCONTINUED | OUTPATIENT
Start: 2025-06-25 | End: 2025-06-27 | Stop reason: HOSPADM

## 2025-06-25 RX ORDER — NICOTINE 21 MG/24HR
1 PATCH, TRANSDERMAL 24 HOURS TRANSDERMAL DAILY
Qty: 30 PATCH | Refills: 3 | Status: ON HOLD | OUTPATIENT
Start: 2025-06-26 | End: 2025-06-27 | Stop reason: HOSPADM

## 2025-06-25 RX ORDER — ALBUTEROL SULFATE 0.83 MG/ML
2.5 SOLUTION RESPIRATORY (INHALATION) EVERY 4 HOURS PRN
Status: DISCONTINUED | OUTPATIENT
Start: 2025-06-25 | End: 2025-06-27 | Stop reason: HOSPADM

## 2025-06-25 RX ORDER — METOPROLOL SUCCINATE 25 MG/1
25 TABLET, EXTENDED RELEASE ORAL DAILY
Status: DISCONTINUED | OUTPATIENT
Start: 2025-06-25 | End: 2025-06-27 | Stop reason: HOSPADM

## 2025-06-25 RX ADMIN — GABAPENTIN 300 MG: 300 CAPSULE ORAL at 08:05

## 2025-06-25 RX ADMIN — METOPROLOL SUCCINATE 25 MG: 25 TABLET, EXTENDED RELEASE ORAL at 21:05

## 2025-06-25 RX ADMIN — BENZTROPINE MESYLATE 0.5 MG: 0.5 TABLET ORAL at 21:05

## 2025-06-25 RX ADMIN — GLIPIZIDE 5 MG: 5 TABLET ORAL at 06:03

## 2025-06-25 RX ADMIN — INSULIN LISPRO 2 UNITS: 100 INJECTION, SOLUTION INTRAVENOUS; SUBCUTANEOUS at 11:28

## 2025-06-25 RX ADMIN — TRAZODONE HYDROCHLORIDE 100 MG: 100 TABLET ORAL at 19:50

## 2025-06-25 RX ADMIN — HYDROXYZINE HYDROCHLORIDE 50 MG: 50 TABLET ORAL at 19:50

## 2025-06-25 RX ADMIN — LOSARTAN POTASSIUM 50 MG: 50 TABLET, FILM COATED ORAL at 21:04

## 2025-06-25 RX ADMIN — SODIUM CHLORIDE, PRESERVATIVE FREE 10 ML: 5 INJECTION INTRAVENOUS at 08:09

## 2025-06-25 RX ADMIN — ENOXAPARIN SODIUM 40 MG: 100 INJECTION SUBCUTANEOUS at 08:05

## 2025-06-25 RX ADMIN — ACETAMINOPHEN 650 MG: 325 TABLET ORAL at 19:50

## 2025-06-25 RX ADMIN — TIOTROPIUM BROMIDE INHALATION SPRAY 5 MCG: 3.12 SPRAY, METERED RESPIRATORY (INHALATION) at 08:24

## 2025-06-25 RX ADMIN — FAMOTIDINE 20 MG: 20 TABLET, FILM COATED ORAL at 08:12

## 2025-06-25 RX ADMIN — BISACODYL 5 MG: 5 TABLET, COATED ORAL at 10:14

## 2025-06-25 RX ADMIN — ATORVASTATIN CALCIUM 20 MG: 20 TABLET, FILM COATED ORAL at 21:04

## 2025-06-25 RX ADMIN — BENZTROPINE MESYLATE 0.5 MG: 0.5 TABLET ORAL at 08:13

## 2025-06-25 RX ADMIN — ASPIRIN 81 MG: 81 TABLET, DELAYED RELEASE ORAL at 08:05

## 2025-06-25 RX ADMIN — GABAPENTIN 300 MG: 300 CAPSULE ORAL at 21:04

## 2025-06-25 ASSESSMENT — PATIENT HEALTH QUESTIONNAIRE - PHQ9
SUM OF ALL RESPONSES TO PHQ QUESTIONS 1-9: 2
2. FEELING DOWN, DEPRESSED OR HOPELESS: SEVERAL DAYS
1. LITTLE INTEREST OR PLEASURE IN DOING THINGS: SEVERAL DAYS
SUM OF ALL RESPONSES TO PHQ QUESTIONS 1-9: 2

## 2025-06-25 ASSESSMENT — PAIN DESCRIPTION - LOCATION
LOCATION: GENERALIZED
LOCATION: NECK

## 2025-06-25 ASSESSMENT — PAIN SCALES - WONG BAKER: WONGBAKER_NUMERICALRESPONSE: NO HURT

## 2025-06-25 ASSESSMENT — LIFESTYLE VARIABLES
HOW OFTEN DO YOU HAVE A DRINK CONTAINING ALCOHOL: NEVER
HOW MANY STANDARD DRINKS CONTAINING ALCOHOL DO YOU HAVE ON A TYPICAL DAY: PATIENT DOES NOT DRINK
HOW OFTEN DO YOU HAVE A DRINK CONTAINING ALCOHOL: NEVER
HOW MANY STANDARD DRINKS CONTAINING ALCOHOL DO YOU HAVE ON A TYPICAL DAY: PATIENT DOES NOT DRINK

## 2025-06-25 ASSESSMENT — SLEEP AND FATIGUE QUESTIONNAIRES
AVERAGE NUMBER OF SLEEP HOURS: 6
DO YOU USE A SLEEP AID: NO
DO YOU HAVE DIFFICULTY SLEEPING: NO

## 2025-06-25 ASSESSMENT — PAIN DESCRIPTION - DESCRIPTORS: DESCRIPTORS: ACHING

## 2025-06-25 ASSESSMENT — PAIN SCALES - GENERAL
PAINLEVEL_OUTOF10: 4
PAINLEVEL_OUTOF10: 4
PAINLEVEL_OUTOF10: 0

## 2025-06-25 ASSESSMENT — PAIN - FUNCTIONAL ASSESSMENT: PAIN_FUNCTIONAL_ASSESSMENT: ACTIVITIES ARE NOT PREVENTED

## 2025-06-25 NOTE — PLAN OF CARE
Problem: Chronic Conditions and Co-morbidities  Goal: Patient's chronic conditions and co-morbidity symptoms are monitored and maintained or improved  Outcome: Adequate for Discharge     Problem: Discharge Planning  Goal: Discharge to home or other facility with appropriate resources  Outcome: Adequate for Discharge     Problem: Pain  Goal: Verbalizes/displays adequate comfort level or baseline comfort level  Outcome: Adequate for Discharge     Problem: Risk for Elopement  Goal: Patient will not exit the unit/facility without proper excort  Outcome: Adequate for Discharge     Problem: Self Harm/Suicidality  Goal: Will have no self-injury during hospital stay  Description: INTERVENTIONS:  1.  Ensure constant observer at bedside with Q15M safety checks  2.  Maintain a safe environment  3.  Secure patient belongings  4.  Ensure family/visitors adhere to safety recommendations  5.  Ensure safety tray has been added to patient's diet order  6.  Every shift and PRN: Re-assess suicidal risk via Frequent Screener    Outcome: Adequate for Discharge     Problem: Safety - Adult  Goal: Free from fall injury  Outcome: Adequate for Discharge

## 2025-06-25 NOTE — PROCEDURES
EEG REPORT       Patient: Annalisa Sams Age: 65 y.o.  MRN: [id number]      Referring Provider: No ref. provider found    History: This routine 30 minute scalp EEG was recorded with video- monitoring for a 65 y.o.. female  who presented with encephalopathy. This EEG was performed to evaluate for focal and epileptiform abnormalities.     Annalisa Sams   Current Facility-Administered Medications   Medication Dose Route Frequency Provider Last Rate Last Admin    LORazepam (ATIVAN) injection 0.5 mg  0.5 mg IntraVENous Once Yudi Zambrano DO        nicotine (NICODERM CQ) 21 MG/24HR 1 patch  1 patch TransDERmal Daily Ion Harmon MD   1 patch at 06/24/25 1615    benztropine (COGENTIN) tablet 0.5 mg  0.5 mg Oral BID Yudi Zambrano DO   0.5 mg at 06/24/25 1919    [Held by provider] levomilnacipran (FETZIMA) extended release capsule 120 mg  120 mg Oral Daily Ion Harmon MD        sodium chloride flush 0.9 % injection 10 mL  10 mL IntraVENous PRN Yudi Zambrano DO   10 mL at 06/24/25 1615    hydrALAZINE (APRESOLINE) injection 10 mg  10 mg IntraVENous Q4H PRN Ion Harmon MD        albuterol (PROVENTIL) (2.5 MG/3ML) 0.083% nebulizer solution 2.5 mg  2.5 mg Nebulization Q4H PRN Ion Harmon MD        sodium chloride flush 0.9 % injection 5-40 mL  5-40 mL IntraVENous 2 times per day Ion Harmon MD   10 mL at 06/24/25 1919    sodium chloride flush 0.9 % injection 5-40 mL  5-40 mL IntraVENous PRN Ion Harmon MD        0.9 % sodium chloride infusion   IntraVENous PRN Ion Harmon MD        potassium chloride (KLOR-CON M) extended release tablet 40 mEq  40 mEq Oral PRN Ion Harmon MD        Or    potassium bicarb-citric acid (EFFER-K) effervescent tablet 40 mEq  40 mEq Oral PRN Ion Harmon MD        Or    potassium chloride 10 mEq/100 mL IVPB (Peripheral Line)  10 mEq IntraVENous PRN Ion Harmon MD        magnesium sulfate 2000 mg in water 50 mL IVPB  2,000 mg IntraVENous

## 2025-06-25 NOTE — CARE COORDINATION
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    Spoke with Patient, and Current Transitional Plan is:    [] Home Independently    [] Home with HC    [] Skilled Nursing Facility    [] Acute Rehabilitation    [] Long Term Acute Care (LTAC)    [x] Other: BHI, when Medically cleared. Pt is agreeable.     Medical Management: Remains on IV Rocephin, Neuro- MRI Brain. Nephro following.     Testing Ordered: LABS    Additional Notes: Jaimeter remains at the bedside.     Electronically signed by Yoli Crook RN on 6/25/2025 at 10:38 AM

## 2025-06-25 NOTE — DISCHARGE SUMMARY
[facility]  Family Medicine      Discharge Summary      NAME:  Annalisa Sams  :  1960  MRN:  [id number]    Admit date:  2025  Discharge date:  2025    Admitting Physician:  Ion Harmon MD    Primary Diagnosis on Admission:   Present on Admission:   Movement disorder   Suicidal ideation   Chronic kidney disease, stage III (moderate) (HCC)   Acute cystitis with hematuria   Pulmonary emphysema, unspecified emphysema type (HCC)   Type 2 diabetes mellitus with chronic kidney disease   Depression with suicidal ideation   Chorea      Secondary Diagnoses:  does not have any pertinent problems on file.      Admission Condition:  poor     Discharged Condition: fair      Hospital Course:   The patient was admitted for the management of suicidal ideation and abnormal movements. Pt has severe depression and came to the ER with suicidal thoughts, however it was noted she had choreiform movements and was admitted for neuro workup. Sx thought to be 2/2 to her longstanding use of Fetzima for depression, started on cogentin and transferred to [facility].      The patient was seen and examined at bedside today. Pt feels better with no further complaints.  There were no acute events overnight. All relevant notes, labs & imaging were reviewed.  The case was discussed with nursing staff. Vitals and Labs are at pts baseline. All consultants involved during this admission are agreeable to discharge.    Consults:  neurology and psychiatry      Disposition:   BHI    Instructions to Patient:      Follow up with Joanne Ladd APRN - CNP in  1-2 weeks    Discharge Medications:       Medication List        CONTINUE taking these medications      Carpal Tunnel Wrist Stabilizer Misc  1 each by Does not apply route daily     OneTouch Delica Lancets 33G Misc  Check BS bid and prn            ASK your doctor about these medications      ammonium lactate 12 % lotion  Commonly known as: LAC-HYDRIN     aspirin EC 81 MG EC

## 2025-06-25 NOTE — PROGRESS NOTES
[facility] Neurology Specialist  [address]  [place] [address]  PH:  [phone] or [phone]  FAX:  [phone]            Brief history: Annalisa Sams is a 65 y.o. old female admitted on 2025 with  abnormal movements and SI.      Subjective: No new neurological events overnight. Patient denies any new weakness, numbness, tingling or headache. MRI Brain showed NAP and EEG was normal. Seen by psychiatry.      Objective: /70   Pulse 77   Temp 98.5 °F (36.9 °C) (Oral)   Resp 16   Ht 1.676 m (5' 6\")   Wt 68.5 kg (151 lb)   LMP 1996   SpO2 94%   BMI 24.37 kg/m²       Medications:    LORazepam  0.5 mg IntraVENous Once    nicotine  1 patch TransDERmal Daily    benztropine  0.5 mg Oral BID    [Held by provider] levomilnacipran  120 mg Oral Daily    sodium chloride flush  5-40 mL IntraVENous 2 times per day    enoxaparin  40 mg SubCUTAneous Daily    cefTRIAXone (ROCEPHIN) IV  1,000 mg IntraVENous Q24H    glipiZIDE  5 mg Oral QAM AC    aspirin EC  81 mg Oral Daily    gabapentin  300 mg Oral BID    losartan  50 mg Oral Daily    metoprolol succinate  25 mg Oral Daily    atorvastatin  20 mg Oral Nightly    tiotropium  2 puff Inhalation Daily RT    traZODone  100 mg Oral Nightly    insulin lispro  0-8 Units SubCUTAneous 4x Daily AC & HS          Physical Exam:   /70   Pulse 77   Temp 98.5 °F (36.9 °C) (Oral)   Resp 16   Ht 1.676 m (5' 6\")   Wt 68.5 kg (151 lb)   LMP 1996   SpO2 94%   BMI 24.37 kg/m²   Temp (24hrs), Av.4 °F (36.9 °C), Min:98.3 °F (36.8 °C), Max:98.5 °F (36.9 °C)        General examination:       General Appearance:  alert, well appearing, and in no acute distress  HEENT: Normocephalic, atraumatic, moist mucus membranes  Neck: supple, no carotid bruits, (-) nuchal rigidity  Lungs:  Respirations unlabored, chest wall no deformity, BS normal  Cardiovascular: normal rate, regular rhythm  Abdomen: Soft, nontender, nondistended, normal 
Department of Psychiatry  Consult Service   Psychiatric progress note        REASON FOR CONSULT: Suicidal ideation    CONSULTING PHYSICIAN: Ion Harmon    History obtained from: patient, EMR, treatment team    Interim history  The patient was seen face-to-face.  Her \"emergency contact\" was present outside the room.  The patient wanted me to speak to him as well.  The patient states that she is feeling little better.  She continues to have tremulous/choreoid movements.  She remains depressed.  The patient's friend is concerned about her mental status.  Her Fetzima has been held as they were concerned that it may be contributing to her movement.  The patient is somewhat distracted.  She denies any hallucinations or paranoia.  We will continue with the plan to admit the patient to [facility] for further evaluation and management of suicidal ideation and depression    HISTORY OF PRESENT ILLNESS:          The patient is a 65 y.o. female with significant psychiatric history of MDD who is admitted medically for assessment of involuntary movements.  She initially presented to the ED endorsing suicidal ideation with thoughts to overdose.  Neurology has been consulted to assess for ataxia/chorea.    On assessment, patient is resting in bed.  She is awake and alert.  Thought process linear and goal-directed.  She appears disheveled with poor grooming and hygiene.  Patient identifies that her poor self-care has been leading to increased conflict in her relationship with her significant other.  They have been arguing her nearly daily basis.  She states that she is feeling hopeless and helpless.  She has very little energy and motivation.  Mood has been very low, consistently, for the last few weeks.  She reports increased severity in her suicidal thoughts, now has a plan and believes that she would act on these thoughts to overdose.  She reports a previous overdose in 2014.  Noted that she does have a previous inpatient psychiatric 
I contacted about patient's voluntary admission. Charge nurse calling on call provider to see if they will accept.   
[facility]   OCCUPATIONAL THERAPY MISSED TREATMENT NOTE   INPATIENT   Date: 25  Patient Name: Annalisa Sams       Room:   MRN: [id number]   Account #: [id number]    : 1960  (65 y.o.)  Gender: female                 REASON FOR MISSED TREATMENT:  Patient screened for occupational therapy this date.   -   Writer introduced self and role of OT. Patient demonstrated independent functional mobility to/from doorway without assistive device. Patient reports she has been going to/from bathroom and able to complete toileting tasks independently. Patient denies any further concerns or deficits at this time. OT being discontinued with no acute OT needs. Please re-order if there is a change in status. 0932    Electronically signed by CHASITY Sarah on 25 at 11:18 AM EDT   
Neuro workup negative, the patient is medically stable for BHI, she is agreeable.       Ion Harmon MD  06/25/25 at 12:53 PM        
Pharmacy Medication History Note      List of current medications patient is taking is complete.    Source of information: [employer], Care Everywhere, [facility]     Changes made to medication list:  Medications removed (include reason, ex. therapy complete or physician discontinued, noncompliance):  None     Medications flagged for provider review:  Glimepiride 4 mg - current order is for 2 mg daily     Medications added/doses adjusted:  None     Other notes (ex. Recent course of antibiotics, Coumadin dosing):  Per [facility], last fill of gabapentin was on 4/4/25 with quantity 180 for 90 days.       Current Home Medication List at Time of Admission:  Prior to Admission medications    Medication Sig   losartan (COZAAR) 50 MG tablet TAKE 1 TABLET BY MOUTH DAILY   vitamin D (CHOLECALCIFEROL) 50 MCG (2000 UT) TABS tablet Take 1 tablet by mouth daily   glimepiride (AMARYL) 2 MG tablet Take 1 tablet by mouth nightly   ammonium lactate (LAC-HYDRIN) 12 % lotion Apply topically as needed   umeclidinium bromide (INCRUSE ELLIPTA) 62.5 MCG/ACT inhaler INHALE ONE PUFF BY MOUTH DAILY   gabapentin (NEURONTIN) 300 MG capsule Take 1 capsule by mouth 2 times daily for 180 days.   glimepiride (AMARYL) 4 MG tablet Take 1 tablet by mouth every morning (before breakfast) TAKE ONE TABLET BY MOUTH EVERY MORNING AT BREAKFAST AND TAKE ONE TABLET BY MOUTH DAILY WITH DINNER  Patient not taking: Reported on 6/23/2025   famotidine (PEPCID) 40 MG tablet Take 1 tablet by mouth daily   FARXIGA 5 MG tablet TAKE 1 TABLET BY MOUTH EVERY MORNING   metoprolol succinate (TOPROL XL) 25 MG extended release tablet Take 1 tablet by mouth daily   simvastatin (ZOCOR) 40 MG tablet TAKE ONE TABLET BY MOUTH ONCE NIGHTLY   fluocinonide (LIDEX) 0.05 % ointment Apply topically 2 times daily Apply topically 2 times daily.   aspirin EC 81 MG EC tablet Take 1 tablet by mouth daily   FETZIMA 120 MG CP24 ER capsule Take 1 capsule by mouth daily   traZODone (DESYREL) 100 MG 
Physical Therapy        Physical Therapy Screen Note      DATE: 2025    NAME: Annalisa Sams  MRN: [id number]   : 1960      Patient seen this date for Physical Therapy Screen:     D/C PT, patient independent w/ functional mobility and denies need for physical therapy services. Please reoder PT if needs arise.     The above documentation completed by Student Physical Therapist was provided under the direct line of sight by supervision. Documentation was reviewed and accepted by supervising Clinical Instructor Penelope Izaguirre PT.       Electronically signed by TAINA Land on 2025 at 10:20 AM      
Pt admitted to 2078, admission questions and assessment complete. Sitter at bedside, pt oriented to room and call light  
Pt's belong including purse, wallet, and phone, locked in [facility] cupboard.   
SBAR report called to Nikkie from [facility]. All questions answered. IV removed, no complications. Belongings returned to patient. Attempted to notify patients emergency contact David of transfer but no answer at this time.   
Writer spoke with Dr. Penaloza via phone and reviewed patients charts including labs. Dr. Penaloza is ok with patient receiving MRI contrast.   
in the Last Year: No       Family History:   Family History   Problem Relation Age of Onset    Thyroid Disease Mother     Other Mother         renal failure,thyroidectomy    Diabetes Mother     Kidney Disease Mother     Heart Disease Father     Diabetes Brother     High Blood Pressure Brother     Coronary Art Dis Brother         Patient denies    Heart Attack Brother              Objective:  CURRENT TEMPERATURE:  Temp: 98.5 °F (36.9 °C)  MAXIMUM TEMPERATURE OVER 24HRS:  Temp (24hrs), Av.4 °F (36.9 °C), Min:98.3 °F (36.8 °C), Max:98.5 °F (36.9 °C)    CURRENT RESPIRATORY RATE:  Respirations: 16  CURRENT PULSE:  Pulse: 77  CURRENT BLOOD PRESSURE:  BP: 138/70  24HR BLOOD PRESSURE RANGE:  Systolic (24hrs), Av , Min:132 , Max:146   ; Diastolic (24hrs), Av, Min:70, Max:76    24HR INTAKE/OUTPUT:  No intake or output data in the 24 hours ending 25 1413  Patient Vitals for the past 96 hrs (Last 3 readings):   Weight   25 1541 68.5 kg (151 lb)       Physical Exam:  GENERAL APPEARANCE: Alert and cooperative, and appears to be in no acute distress.  HEAD: normocephalic  EYES:  EOMI. Not pale, anicteric   NOSE:  No nasal discharge.    THROAT:  Oral cavity and pharynx normal. Moist  CARDIAC: Normal S1 and S2. No S3, S4 or murmurs. Rhythm is regular.  LUNGS:  diminished breath sounds.  No wheezing no accessory muscle use  NECK: Neck supple, non-tender   GI-soft nontender nondistended  MUSKULOSKELETAL: Adequately aligned spine. No joint erythema or tenderness.   EXTREMITIES: No edema. Peripheral pulses intact.   NEURO: Nonfocal      Labs:   CBC:  Recent Labs     25  1610 25  0608 25  0541   WBC 9.1 6.0 7.4   RBC 4.19 4.64 4.34   HGB 13.2 14.4 13.5   HCT 40.4 44.4 41.3   MCV 96.4 95.7 95.2   MCH 31.5 31.0 31.1   MCHC 32.7 32.4 32.7   RDW 13.9 13.9 13.6    177 178   MPV 10.8 10.6 10.4      BMP:   Recent Labs     25  1610 25  0608 25  0541    143 139   K 4.6 4.9

## 2025-06-26 PROBLEM — G24.01 TARDIVE DYSKINESIA: Status: ACTIVE | Noted: 2025-06-26

## 2025-06-26 LAB
COPPER SERPL-MCNC: 117.8 UG/DL (ref 80–155)
EKG ATRIAL RATE: 94 BPM
EKG P AXIS: 69 DEGREES
EKG P-R INTERVAL: 150 MS
EKG Q-T INTERVAL: 352 MS
EKG QRS DURATION: 72 MS
EKG QTC CALCULATION (BAZETT): 440 MS
EKG R AXIS: 68 DEGREES
EKG T AXIS: 71 DEGREES
EKG VENTRICULAR RATE: 94 BPM
EST. AVERAGE GLUCOSE BLD GHB EST-MCNC: 146 MG/DL
GLUCOSE BLD-MCNC: 113 MG/DL (ref 65–105)
GLUCOSE BLD-MCNC: 127 MG/DL (ref 65–105)
GLUCOSE BLD-MCNC: 136 MG/DL (ref 65–105)
HBA1C MFR BLD: 6.7 % (ref 4–6)

## 2025-06-26 PROCEDURE — 93005 ELECTROCARDIOGRAM TRACING: CPT

## 2025-06-26 PROCEDURE — 1240000000 HC EMOTIONAL WELLNESS R&B

## 2025-06-26 PROCEDURE — 6370000000 HC RX 637 (ALT 250 FOR IP)

## 2025-06-26 PROCEDURE — 6370000000 HC RX 637 (ALT 250 FOR IP): Performed by: NURSE PRACTITIONER

## 2025-06-26 PROCEDURE — 99222 1ST HOSP IP/OBS MODERATE 55: CPT

## 2025-06-26 PROCEDURE — APPSS60 APP SPLIT SHARED TIME 46-60 MINUTES

## 2025-06-26 PROCEDURE — 82947 ASSAY GLUCOSE BLOOD QUANT: CPT

## 2025-06-26 PROCEDURE — 6370000000 HC RX 637 (ALT 250 FOR IP): Performed by: PSYCHIATRY & NEUROLOGY

## 2025-06-26 RX ORDER — POLYETHYLENE GLYCOL 3350 17 G/17G
17 POWDER, FOR SOLUTION ORAL 2 TIMES DAILY
Status: DISCONTINUED | OUTPATIENT
Start: 2025-06-26 | End: 2025-06-27 | Stop reason: HOSPADM

## 2025-06-26 RX ADMIN — GABAPENTIN 300 MG: 300 CAPSULE ORAL at 08:34

## 2025-06-26 RX ADMIN — BENZTROPINE MESYLATE 0.5 MG: 0.5 TABLET ORAL at 21:00

## 2025-06-26 RX ADMIN — LOSARTAN POTASSIUM 50 MG: 50 TABLET, FILM COATED ORAL at 21:00

## 2025-06-26 RX ADMIN — POLYETHYLENE GLYCOL 3350 17 G: 17 POWDER, FOR SOLUTION ORAL at 21:00

## 2025-06-26 RX ADMIN — HYDROXYZINE HYDROCHLORIDE 50 MG: 50 TABLET ORAL at 21:00

## 2025-06-26 RX ADMIN — GLIPIZIDE 5 MG: 5 TABLET ORAL at 08:33

## 2025-06-26 RX ADMIN — BENZTROPINE MESYLATE 0.5 MG: 0.5 TABLET ORAL at 08:33

## 2025-06-26 RX ADMIN — FAMOTIDINE 20 MG: 20 TABLET, FILM COATED ORAL at 08:34

## 2025-06-26 RX ADMIN — GABAPENTIN 300 MG: 300 CAPSULE ORAL at 21:00

## 2025-06-26 RX ADMIN — TRAZODONE HYDROCHLORIDE 100 MG: 100 TABLET ORAL at 21:00

## 2025-06-26 RX ADMIN — ATORVASTATIN CALCIUM 20 MG: 20 TABLET, FILM COATED ORAL at 21:00

## 2025-06-26 RX ADMIN — METOPROLOL SUCCINATE 25 MG: 25 TABLET, EXTENDED RELEASE ORAL at 21:00

## 2025-06-26 RX ADMIN — ASPIRIN 81 MG: 81 TABLET, COATED ORAL at 08:33

## 2025-06-26 RX ADMIN — TIOTROPIUM BROMIDE INHALATION SPRAY 5 MCG: 3.12 SPRAY, METERED RESPIRATORY (INHALATION) at 08:35

## 2025-06-26 ASSESSMENT — LIFESTYLE VARIABLES
HOW OFTEN DO YOU HAVE A DRINK CONTAINING ALCOHOL: NEVER
HOW OFTEN DO YOU HAVE A DRINK CONTAINING ALCOHOL: NEVER
HOW MANY STANDARD DRINKS CONTAINING ALCOHOL DO YOU HAVE ON A TYPICAL DAY: PATIENT DOES NOT DRINK
HOW MANY STANDARD DRINKS CONTAINING ALCOHOL DO YOU HAVE ON A TYPICAL DAY: PATIENT DOES NOT DRINK

## 2025-06-26 NOTE — H&P
[facility] Internal Medicine  Edd Szymanski MD; Ancelmo Jefferson MD,, Fanny Sr MD, Dr Aiden Grayson MD   ; Srinivasan Faustin MD    [facility] Internal Medicine   IN-PATIENT SERVICE   [facility]     HISTORY AND PHYSICAL EXAMINATION            Date:   6/26/2025  Patient name:  Annalisa Sams  Date of admission:  6/25/2025  4:20 PM  MRN:   [id number]  Account:  [id number]  YOB: 1960  PCP:    Joanne Ladd APRN - CNP  Room:   [facility]0213-  Code Status:    Full Code      Chief Complaint:     Suicidal /Ac Psychosis    History Obtained From:     Patient/EMR/bedside RN     History of Present Illness:     64 yo f with PMH as mentioned below, admitted with dep with suicidal ideations.   Initiall admitted to [facility] for suicidal ideations and tremors workup.    Past Medical History:     Past Medical History:   Diagnosis Date    AA (aortic aneurysm)     MILD SUPRA-RENAL    Anxiety     Aortic aneurysm     MILD SUPRA-RENAL    Atherosclerosis of native artery of both lower extremities with intermittent claudication 05/16/2022    Blood clot in vein     Bronchitis     Cerebral infarction (HCC)     several \"mini strokes\" on Feb 14 (about 2017?)- previously saw Dr. Cannon (neurology)    Cervical cancer (HCC)     Cervix no chemo or radiation    CKD (chronic kidney disease) stage 3, GFR 30-59 ml/min ([facility])     Dr. Penaloza- nephrologist    COPD (chronic obstructive pulmonary disease) (HCC)     Depression     Diabetes mellitus (HCC)     Dizziness     Eczema     Hx of blood clots     Hyperlipidemia     Hyperlipidemia     Hypertension     Insomnia     Limb ischemia     Loose, teeth     Nephrolith     Patient denies    MAMIE (obstructive sleep apnea)     \"years ago had sleep study, never used machine\"    Peripheral vascular disease     Secondary diabetes mellitus with stage 3 chronic kidney disease (HCC) 03/12/2019    Stenosis of carotid artery 07/03/2017    Suicide attempt ([facility])     2012 x2 
admissions    Home Medication Compliance: [x] Yes [] No    Past psychiatric medications includes:   Trazodone, cogentin, fetzima    Adverse reactions from psychotropic medications: [] Yes [x] No         Lifetime Psychiatric Review of Systems         Depression: Endorses      Anxiety: Endorses     Panic Attacks: Denies     Joyce or Hypomania: Denies     Phobias: Denies     Obsessions and Compulsions: Denies     Body or Vocal Tics: Denies     Visual Hallucinations: Denies     Auditory Hallucinations: Denies     Delusions/Paranoia: Endorses      PTSD: Denies    Past Medical History:        Diagnosis Date    AA (aortic aneurysm)     MILD SUPRA-RENAL    Anxiety     Aortic aneurysm     MILD SUPRA-RENAL    Atherosclerosis of native artery of both lower extremities with intermittent claudication 05/16/2022    Blood clot in vein     Bronchitis     Cerebral infarction ([facility])     several \"mini strokes\" on Feb 14 (about 2017?)- previously saw Dr. Cannon (neurology)    Cervical cancer ([facility])     Cervix no chemo or radiation    CKD (chronic kidney disease) stage 3, GFR 30-59 ml/min ([facility])     Dr. Penaloza- nephrologist    COPD (chronic obstructive pulmonary disease) ([facility])     Depression     Diabetes mellitus ([facility])     Dizziness     Eczema     Hx of blood clots     Hyperlipidemia     Hyperlipidemia     Hypertension     Insomnia     Limb ischemia     Loose, teeth     Nephrolith     Patient denies    MAMIE (obstructive sleep apnea)     \"years ago had sleep study, never used machine\"    Peripheral vascular disease     Secondary diabetes mellitus with stage 3 chronic kidney disease ([facility]) 03/12/2019    Stenosis of carotid artery 07/03/2017    Suicide attempt ([facility])     2012 x2 with pills (per prior record review, was admitted to [facility] 9-1-13 w/suicide attempt)    Wears dentures     Bottom       Past Surgical History:        Procedure Laterality Date    APPENDECTOMY      CATARACT REMOVAL  09/26/2019    CATARACT REMOVAL WITH IMPLANT  09/12/2019
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
## 2025-06-26 NOTE — GROUP NOTE
Group Therapy Note    Date: 6/26/2025    Group Start Time: 1000  Group End Time: 1020  Group Topic: Psychotherapy     Sumaya Page MSW        Group Therapy Note    Attendees: 1/10     Patient was offered group therapy today but declined to participate despite encouragement from staff.  1:1 was offered    Discipline Responsible: /Counselor      Signature:  TRISTAN Canada

## 2025-06-26 NOTE — PLAN OF CARE
Problem: Self Harm/Suicidality  Goal: Will have no self-injury during hospital stay  Description: INTERVENTIONS:  1.  Ensure constant observer at bedside with Q15M safety checks  2.  Maintain a safe environment  3.  Secure patient belongings  4.  Ensure family/visitors adhere to safety recommendations  5.  Ensure safety tray has been added to patient's diet order  6.  Every shift and PRN: Re-assess suicidal risk via Frequent Screener    Outcome: Progressing  Note: Patient is cooperative, agitated, angry, irritable with being here. Discharge focused. Endorses anxiety and depression. Denies suicidal ideations. Pt denies thoughts of self harm and is agreeable to seeking out should thoughts of self harm arise.  Safe environment maintained.  Q15 minute checks for safety cont per unit policy.  Will cont to monitor for safety and provides support and reassurance as needed.       Problem: Depression  Goal: Will be euthymic at discharge  Description: INTERVENTIONS:  1. Administer medication as ordered  2. Provide emotional support via 1:1 interaction with staff  3. Encourage involvement in milieu/groups/activities  4. Monitor for social isolation  Outcome: Progressing     Problem: Anxiety  Goal: Will report anxiety at manageable levels  Description: INTERVENTIONS:  1. Administer medication as ordered  2. Teach and rehearse alternative coping skills  3. Provide emotional support with 1:1 interaction with staff  Outcome: Progressing

## 2025-06-26 NOTE — GROUP NOTE
Group Therapy Note    Date: 6/26/2025    Group Start Time: 1100  Group End Time: 1135  Group Topic: Cognitive Skills    Ashley Feldman CTRS    Cognitive Skills Group Note        Date: June 26, 2025 Start Time: 11am  End Time: 1135 am       Number of Participants in Group & Unit Census:  4/12    Topic: cognitive skills     Goal of Group:pt will demonstate improved coping skills and improved communication skills      Comments:     Patient did not participate in Cognitive Skills group, despite staff encouragement and explanation of benefits.  Patient remain seclusive to self.  Q15 minute safety checks maintained for patient safety and will continue to encourage patient to attend unit programming.              Signature:  TINY KAUFFMAN

## 2025-06-26 NOTE — PLAN OF CARE
Problem: Chronic Conditions and Co-morbidities  Goal: Patient's chronic conditions and co-morbidity symptoms are monitored and maintained or improved  Outcome: Progressing  Flowsheets (Taken 6/26/2025 1015)  Care Plan - Patient's Chronic Conditions and Co-Morbidity Symptoms are Monitored and Maintained or Improved:   Monitor and assess patient's chronic conditions and comorbid symptoms for stability, deterioration, or improvement   Collaborate with multidisciplinary team to address chronic and comorbid conditions and prevent exacerbation or deterioration   Update acute care plan with appropriate goals if chronic or comorbid symptoms are exacerbated and prevent overall improvement and discharge     Problem: Self Harm/Suicidality  Goal: Will have no self-injury during hospital stay  Description: INTERVENTIONS:  1.  Ensure constant observer at bedside with Q15M safety checks  2.  Maintain a safe environment  3.  Secure patient belongings  4.  Ensure family/visitors adhere to safety recommendations  5.  Ensure safety tray has been added to patient's diet order  6.  Every shift and PRN: Re-assess suicidal risk via Frequent Screener    6/26/2025 1015 by Nikkie Jain RN  Outcome: Progressing  Flowsheets (Taken 6/26/2025 1015)  Will have no self-injury during hospital stay:   Ensure constant observer at bedside with Q15M safety checks   Ensure family/visitors adhere to safety recommendations   Every shift and PRN: Re-assess suicidal risk via Frequent Screener   Ensure safety tray has been added to patient's diet order   Secure patient belongings   Maintain a safe environment     Problem: Depression  Goal: Will be euthymic at discharge  Description: INTERVENTIONS:  1. Administer medication as ordered  2. Provide emotional support via 1:1 interaction with staff  3. Encourage involvement in milieu/groups/activities  4. Monitor for social isolation  6/26/2025 1015 by Nikkie Jain, RN  Outcome: Progressing

## 2025-06-26 NOTE — GROUP NOTE
Group Therapy Note    Date: 6/26/2025    Group Start Time: 1400  Group End Time: 1435  Group Topic: Recreation Group     [facility] Ashley German CTRS      Group Therapy Note    Attendees: 4/10    Patient's Goal:  pt willl demonstrate improved coping skills and leisure awareness     Notes:   pt was pleasant and participated well    Status After Intervention:  Improved    Participation Level: Active Listener and Interactive    Participation Quality: Appropriate      Speech:  normal      Thought Process/Content: Logical      Affective Functioning: congruent      Mood:euthymic       Level of consciousness:  Alert      Response to Learning: Able to verbalize current knowledge/experience, Capable of insight, and Progressing to goal      Endings: None Reported    Modes of Intervention: Support, Socialization, and Activity      Discipline Responsible: Psychoeducational Specialist      Signature:  TINY KAUFFMAN

## 2025-06-27 VITALS
RESPIRATION RATE: 16 BRPM | HEART RATE: 92 BPM | WEIGHT: 151 LBS | HEIGHT: 66 IN | OXYGEN SATURATION: 92 % | DIASTOLIC BLOOD PRESSURE: 51 MMHG | BODY MASS INDEX: 24.27 KG/M2 | SYSTOLIC BLOOD PRESSURE: 133 MMHG | TEMPERATURE: 97.7 F

## 2025-06-27 LAB
GLUCOSE BLD-MCNC: 105 MG/DL (ref 65–105)
GLUCOSE BLD-MCNC: 136 MG/DL (ref 65–105)

## 2025-06-27 PROCEDURE — 6370000000 HC RX 637 (ALT 250 FOR IP): Performed by: NURSE PRACTITIONER

## 2025-06-27 PROCEDURE — 99232 SBSQ HOSP IP/OBS MODERATE 35: CPT | Performed by: INTERNAL MEDICINE

## 2025-06-27 PROCEDURE — 6370000000 HC RX 637 (ALT 250 FOR IP): Performed by: PSYCHIATRY & NEUROLOGY

## 2025-06-27 PROCEDURE — 82947 ASSAY GLUCOSE BLOOD QUANT: CPT

## 2025-06-27 RX ORDER — TRAZODONE HYDROCHLORIDE 100 MG/1
100 TABLET ORAL NIGHTLY PRN
Qty: 30 TABLET | Refills: 0 | Status: SHIPPED | OUTPATIENT
Start: 2025-06-27

## 2025-06-27 RX ORDER — UMECLIDINIUM 62.5 UG/1
AEROSOL, POWDER ORAL
Qty: 90 EACH | Refills: 1 | Status: SHIPPED | OUTPATIENT
Start: 2025-06-27

## 2025-06-27 RX ORDER — ASPIRIN 81 MG/1
81 TABLET, COATED ORAL DAILY
Qty: 128 TABLET | Refills: 0 | Status: SHIPPED | OUTPATIENT
Start: 2025-06-27

## 2025-06-27 RX ORDER — ALBUTEROL SULFATE 0.83 MG/ML
2.5 SOLUTION RESPIRATORY (INHALATION) EVERY 4 HOURS PRN
Qty: 120 EACH | Refills: 3 | Status: SHIPPED | OUTPATIENT
Start: 2025-06-27

## 2025-06-27 RX ORDER — GABAPENTIN 300 MG/1
300 CAPSULE ORAL 2 TIMES DAILY
Qty: 180 CAPSULE | Refills: 1 | Status: SHIPPED | OUTPATIENT
Start: 2025-06-27 | End: 2025-12-24

## 2025-06-27 RX ORDER — LOSARTAN POTASSIUM 50 MG/1
50 TABLET ORAL DAILY
Qty: 90 TABLET | Refills: 3 | Status: SHIPPED | OUTPATIENT
Start: 2025-06-27

## 2025-06-27 RX ORDER — BENZTROPINE MESYLATE 0.5 MG/1
0.5 TABLET ORAL 2 TIMES DAILY
Qty: 60 TABLET | Refills: 3 | Status: SHIPPED | OUTPATIENT
Start: 2025-06-27

## 2025-06-27 RX ORDER — METOPROLOL SUCCINATE 25 MG/1
25 TABLET, EXTENDED RELEASE ORAL DAILY
Qty: 30 TABLET | Refills: 3 | Status: SHIPPED | OUTPATIENT
Start: 2025-06-27

## 2025-06-27 RX ORDER — FAMOTIDINE 20 MG/1
20 TABLET, FILM COATED ORAL DAILY
Qty: 60 TABLET | Refills: 3 | Status: SHIPPED | OUTPATIENT
Start: 2025-06-28

## 2025-06-27 RX ADMIN — FAMOTIDINE 20 MG: 20 TABLET, FILM COATED ORAL at 08:31

## 2025-06-27 RX ADMIN — TIOTROPIUM BROMIDE INHALATION SPRAY 5 MCG: 3.12 SPRAY, METERED RESPIRATORY (INHALATION) at 08:30

## 2025-06-27 RX ADMIN — BENZTROPINE MESYLATE 0.5 MG: 0.5 TABLET ORAL at 08:31

## 2025-06-27 RX ADMIN — ACETAMINOPHEN 650 MG: 325 TABLET ORAL at 13:21

## 2025-06-27 RX ADMIN — GABAPENTIN 300 MG: 300 CAPSULE ORAL at 08:31

## 2025-06-27 RX ADMIN — ASPIRIN 81 MG: 81 TABLET, COATED ORAL at 08:31

## 2025-06-27 RX ADMIN — GLIPIZIDE 5 MG: 5 TABLET ORAL at 08:31

## 2025-06-27 ASSESSMENT — PAIN SCALES - GENERAL: PAINLEVEL_OUTOF10: 3

## 2025-06-27 ASSESSMENT — PAIN DESCRIPTION - LOCATION: LOCATION: HEAD

## 2025-06-27 ASSESSMENT — PAIN DESCRIPTION - DESCRIPTORS: DESCRIPTORS: ACHING

## 2025-06-27 NOTE — PLAN OF CARE
Problem: Self Harm/Suicidality  Goal: Will have no self-injury during hospital stay  Description: INTERVENTIONS:  1.  Ensure constant observer at bedside with Q15M safety checks  2.  Maintain a safe environment  3.  Secure patient belongings  4.  Ensure family/visitors adhere to safety recommendations  5.  Ensure safety tray has been added to patient's diet order  6.  Every shift and PRN: Re-assess suicidal risk via Frequent Screener    6/26/2025 2240 by Emperatriz Boston RN  Outcome: Progressing  Note: Patient is cooperative, anxious, withdrawn. Endorses depression. Denies suicidal ideations. Pt denies thoughts of self harm and is agreeable to seeking out should thoughts of self harm arise.  Safe environment maintained.  Q15 minute checks for safety cont per unit policy.  Will cont to monitor for safety and provides support and reassurance as needed.       Problem: Depression  Goal: Will be euthymic at discharge  Description: INTERVENTIONS:  1. Administer medication as ordered  2. Provide emotional support via 1:1 interaction with staff  3. Encourage involvement in milieu/groups/activities  4. Monitor for social isolation  6/26/2025 2240 by Emperatriz Boston RN  Outcome: Progressing     Problem: Anxiety  Goal: Will report anxiety at manageable levels  Description: INTERVENTIONS:  1. Administer medication as ordered  2. Teach and rehearse alternative coping skills  3. Provide emotional support with 1:1 interaction with staff  6/26/2025 2240 by Emperatriz Boston, RN  Outcome: Progressing

## 2025-06-27 NOTE — CARE COORDINATION
Discharge Arrangements:  N/A    Guardian notified: n/a    Discharge destination/address: [facility] [facility] [address]  [place] [address]    Transported by:  cab    Patient was not accepting of referral.  No noted substance use   Follow up appointment is scheduled for     [id number] [place] [address]  [phone]  fax [phone]   You have a scheduled appointment on Tuesday July 29th at 10:20am with Dr Richard at the [facility] office   *ADAM resources were offered to patient throughout admission and at time of discharge. This list of [facility] ADAM providers was provided to patient:     [facility]  [address] [address]   [address] [facility] [address]  Phone: [phone]     Phone: [phone]    [facility] [place] [facility]   [address] [facility] [address]   [address] [facility] [address]  Phone: [phone]     Phone: [phone]    [facility] [place]  [address] [facility] [address]    [address] [facility] [facility] [address]  Phone: [phone]     Phone: [phone]    [facility]     [facility]   [address] [facility] [facility] [address]  [place] [address]      Phone: [phone]  Phone: [phone]        [facility]  [address] [facility] [facility] [address]   [address] [facility] [address]  Phone: [phone]     Phone:  [phone]    [employer]      [facility]  [address] [facility] [address]   [address] [facility] [address]  Phone: [phone]     Phone: [phone]    [facility]  [address] [facility] [address]   [address] [facility] [address]  Phone: [phone]     Phone: [phone]    [facility]  [facility]  [address] [facility] [address] [address] [facility]

## 2025-06-27 NOTE — BH NOTE
[facility]  Admission Note     Admission Type:   Voluntary     Reason for admission:  Reason for Admission: Depression with Suicidal ideation, Present involuntary movements. plan to OD, Neuro work up negative. Denied Suicidal ideation on admission      Addictive Behavior:   Addictive Behavior  In the Past 3 Months, Have You Felt or Has Someone Told You That You Have a Problem With  : None    Medical Problems:   Past Medical History:   Diagnosis Date    AA (aortic aneurysm)     MILD SUPRA-RENAL    Anxiety     Aortic aneurysm     MILD SUPRA-RENAL    Atherosclerosis of native artery of both lower extremities with intermittent claudication 05/16/2022    Blood clot in vein     Bronchitis     Cerebral infarction ([facility])     several \"mini strokes\" on Feb 14 (about 2017?)- previously saw Dr. Cannon (neurology)    Cervical cancer ([facility])     Cervix no chemo or radiation    CKD (chronic kidney disease) stage 3, GFR 30-59 ml/min ([facility])     Dr. Penaloza- nephrologist    COPD (chronic obstructive pulmonary disease) ([facility])     Depression     Diabetes mellitus ([facility])     Dizziness     Eczema     Hx of blood clots     Hyperlipidemia     Hyperlipidemia     Hypertension     Insomnia     Limb ischemia     Loose, teeth     Nephrolith     Patient denies    MAMIE (obstructive sleep apnea)     \"years ago had sleep study, never used machine\"    Peripheral vascular disease     Secondary diabetes mellitus with stage 3 chronic kidney disease ([facility]) 03/12/2019    Stenosis of carotid artery 07/03/2017    Suicide attempt ([facility])     2012 x2 with pills (per prior record review, was admitted to [facility] 9-1[facility] w/suicide attempt)    Wears dentures     Bottom       Status EXAM:  Mental Status and Behavioral Exam  Normal: No  Level of Assistance: Independent/Self  Facial Expression: Flat  Affect: Incongruent  Level of Consciousness: Alert  Frequency of Checks: 4 times per hour, close  Mood:Normal: No  Mood: Depressed, Anxious  Motor Activity:Normal: 
[facility]  Initial Interdisciplinary Treatment Plan Note      Original treatment plan Date & Time: 6/26/2025 1245    Admission Type:  Admission Type: Voluntary    Reason for admission:   Reason for Admission: Depression with Suicidal ideation, Present involuntary movements. plan to OD, Neuro work up negative. Denied Suicidal ideation on admission    Estimated Length of Stay:  5-7days  Estimated Discharge Date: To be determined by physician.    PATIENT STRENGTHS:  Patient Strengths:   Patient Strengths and Limitations:Limitations: Difficulty problem solving/relies on others to help solve problems, Difficult relationships / poor social skills  Addictive Behavior: Addictive Behavior  In the Past 3 Months, Have You Felt or Has Someone Told You That You Have a Problem With  : None  Medical Problems:  Past Medical History:   Diagnosis Date    AA (aortic aneurysm)     MILD SUPRA-RENAL    Anxiety     Aortic aneurysm     MILD SUPRA-RENAL    Atherosclerosis of native artery of both lower extremities with intermittent claudication 05/16/2022    Blood clot in vein     Bronchitis     Cerebral infarction ([facility])     several \"mini strokes\" on Feb 14 (about 2017?)- previously saw Dr. Cannon (neurology)    Cervical cancer ([facility])     Cervix no chemo or radiation    CKD (chronic kidney disease) stage 3, GFR 30-59 ml/min ([facility])     Dr. Penaloza- nephrologist    COPD (chronic obstructive pulmonary disease) ([facility])     Depression     Diabetes mellitus ([facility])     Dizziness     Eczema     Hx of blood clots     Hyperlipidemia     Hyperlipidemia     Hypertension     Insomnia     Limb ischemia     Loose, teeth     Nephrolith     Patient denies    MAMIE (obstructive sleep apnea)     \"years ago had sleep study, never used machine\"    Peripheral vascular disease     Secondary diabetes mellitus with stage 3 chronic kidney disease ([facility]) 03/12/2019    Stenosis of carotid artery 07/03/2017    Suicide attempt ([facility])     2012 x2 with pills (per 
[id number]   
EKG completed- hard copy in chart     Latest Reference Range & Units 06/26/25 14:56   EKG 12-LEAD  Rpt (P)   Atrial Rate BPM 94 (P)   P Axis degrees 69 (P)   P-R Interval ms 150 (P)   Q-T Interval ms 352 (P)   QRS Duration ms 72 (P)   QTc Calculation (Bazett) ms 440 (P)   R Axis degrees 68 (P)   T Axis degrees 71 (P)   Ventricular Rate BPM 94 (P)   (P): Preliminary  Rpt: View report in Results Review for more information  
OQ Note    Pt refused to complete OQ. Pt yelled at writer to \"get out of my room, I want to go home\".Pt is agitated and ordered  To also get out of her room  
[facility] [place] at this date and time  
Patient given tobacco quitline number 77465277791 at this time, refusing to call at this time, states \"I just dont want to quit now\"- patient given information as to the dangers of long term tobacco use. Continue to reinforce the importance of tobacco cessation.  
Patient given tobacco quitline number 97696986687 at this time, refusing to call at this time, states \" I just dont want to quit now\"- patient given information as to the dangers of long term tobacco use. Continue to reinforce the importance of tobacco cessation.   
techniques in how to quit, available resources  ( ) [facility]                                                                                                                   ( ) Patient refused counseling  ( ) Patient refused referral  ( ) Patient refused prescription upon discharge  ( ) Patient has not smoked in the last 30 days    Metabolic Screening:    Lab Results   Component Value Date    LABA1C 6.7 (H) 06/25/2025       Lab Results   Component Value Date    CHOL 141 05/01/2025    CHOL 139 05/23/2024    CHOL 159 04/14/2023    CHOL 150 09/27/2021    CHOL 134 06/15/2020    CHOL 139 09/16/2019    CHOL 182 08/05/2019    CHOL 226 (H) 01/28/2019    CHOL 118 10/20/2018    CHOL 154 03/12/2018    CHOL 156 08/31/2017    CHOL 173 04/18/2016    CHOL 173 08/12/2015    CHOL 120 02/15/2014    CHOL 124 01/16/2014    CHOL 126 04/23/2013     Lab Results   Component Value Date    TRIG 304 (H) 05/01/2025    TRIG 218 (H) 05/23/2024    TRIG 308 (H) 04/14/2023    TRIG 250 (H) 09/27/2021    TRIG 260 (H) 06/15/2020    TRIG 251 (H) 09/16/2019    TRIG 420 (H) 08/05/2019    TRIG 343 (H) 01/28/2019    TRIG 328 (H) 10/20/2018    TRIG 323 (H) 03/12/2018    TRIG 410 (H) 08/31/2017    TRIG 338 (H) 04/18/2016    TRIG 182 (H) 08/12/2015    TRIG 166 (H) 02/15/2014    TRIG 288 (H) 01/16/2014    TRIG 258 (H) 04/23/2013     Lab Results   Component Value Date    HDL 25 (L) 05/01/2025    HDL 29 (L) 05/23/2024    HDL 28 (L) 04/14/2023    HDL 32 (L) 03/11/2022    HDL 30 (L) 09/27/2021    HDL 31 (L) 06/15/2020    HDL 34 (L) 09/16/2019    HDL 29 (L) 08/05/2019    HDL 29 (L) 01/28/2019    HDL 30 (L) 10/20/2018    HDL 24 (L) 03/12/2018    HDL 24 (L) 08/31/2017    HDL 24 (L) 04/18/2016    HDL 35 (L) 08/12/2015    HDL 35 (L) 02/15/2014    HDL 38 (L) 01/16/2014    HDL 28 (L) 04/23/2013     No components found for: \"LDLCAL\"  No components found for: \"LABVLDL\"    Padmini Florez LPN  Patient discharged to

## 2025-06-27 NOTE — GROUP NOTE
Group Therapy Note    Date: 6/27/2025    Group Start Time: 1330  Group End Time: 1400  Group Topic: wellness group    [facility]    Ashley Mehta CTRS        Group Therapy Note    Attendees 5/9       Patient's Goal:  pt will identify positive coping skills and improve wellness    Notes:  pt was pleasant and participated well    Status After Intervention:  Improved    Participation Level: Active Listener and Interactive    Participation Quality: Appropriate and Sharing      Speech:  normal      Thought Process/Content: Logical      Affective Functioning:congruent      Mood: euthymic       Level of consciousness:  Alert      Response to Learning: Able to verbalize current knowledge/experience, Capable of insight, and Progressing to goal      Endings: None Reported    Modes of Intervention: Education, Support, and Activity      Discipline Responsible: Psychoeducational Specialist      Signature:  TINY KAUFFMAN

## 2025-06-27 NOTE — PROGRESS NOTES
Behavioral Services  Medicare Certification Upon Admission    I certify that this patient's inpatient psychiatric hospital admission is medically necessary for:    [x] (1) Treatment which could reasonably be expected to improve this patient's condition,       [x] (2) Or for diagnostic study;     AND     [x](2) The inpatient psychiatric services are provided while the individual is under the care of a physician and are included in the individualized plan of care.    Estimated length of stay/service 5    Plan for post-hospital care home    Electronically signed by Alfred Hollins MD on 6/26/2025 at 6:07 PM      
Pharmacy Med Education Group Note    Date: 6/26  Start Time: 1330  End Time: 1350    Number Participants in Group:  4    Goal:  Patient will demonstrate an understanding of the medication’s intended purpose and possible adverse effects  Topic: [place] for Pharmacy Med Ed Group    Discipline Responsible:     OT  AT  [facility].  RT     X Other       Participation Level:     None  Minimal      X Active Listener    X Interactive    Monopolizing         Participation Quality:    X Appropriate  Inappropriate     X       Attentive        Intrusive          Sharing        Resistant          Supportive        Lethargic       Affective:     X Congruent  Incongruent  Blunted  Flat    Constricted  Anxious  Elated  Angry    Labile  Depressed  Other         Cognitive:    X Alert  Oriented PPTP     Concentration   X G  F  P   Attention Span   X G  F  P   Short-Term Memory   X G  F  P   Long-Term Memory  G  F  P   ProblemSolving/  Decision Making  G  F  P   Ability to Process  Information   X G  F  P      Contributing Factors             Delusional             Hallucinating             Flight of Ideas             Other:       Modes of Intervention:    X Education   X Support  Exploration    Clarifying  Problem Solving  Confrontation    Socialization  Limit Setting  Reality Testing    Activity  Movement  Media    Other:            Response to Learning:    X Able to verbalize current knowledge/experience    Able to verbalize/acknowledge new learning    Able to retain information    Capable of insight    Able to change behavior    Progressing to goal    Other:        Comments:       Rani Arora, DiannD  PGY-2 Psychiatric Pharmacy Resident    6/26/2025   1:57 PM  
Pharmacy Medication History Note      List of current medications patient is taking is complete.    Source of information: [employer]s, Care Everywhere, [facility]     Changes made to medication list:  Medications removed (include reason, ex. therapy complete or physician discontinued, noncompliance):  None     Medications flagged for provider review:  Glimepiride 4 mg - current order is for 2 mg daily     Medications added/doses adjusted:  Levomilnacipran  mg - last filled 4/30/25    Other notes (ex. Recent course of antibiotics, Coumadin dosing):  Per OARRS, last fill of gabapentin was on 4/4/25 with quantity 180 for 90 days.       Current Home Medication List at Time of Admission:  Prior to Admission medications    Medication Sig   losartan (COZAAR) 50 MG tablet TAKE 1 TABLET BY MOUTH DAILY   vitamin D (CHOLECALCIFEROL) 50 MCG (2000 UT) TABS tablet Take 1 tablet by mouth daily   glimepiride (AMARYL) 2 MG tablet Take 1 tablet by mouth nightly   ammonium lactate (LAC-HYDRIN) 12 % lotion Apply topically as needed   umeclidinium bromide (INCRUSE ELLIPTA) 62.5 MCG/ACT inhaler INHALE ONE PUFF BY MOUTH DAILY   gabapentin (NEURONTIN) 300 MG capsule Take 1 capsule by mouth 2 times daily for 180 days.   glimepiride (AMARYL) 4 MG tablet Take 1 tablet by mouth every morning (before breakfast) TAKE ONE TABLET BY MOUTH EVERY MORNING AT BREAKFAST AND TAKE ONE TABLET BY MOUTH DAILY WITH DINNER  Patient not taking: Reported on 6/23/2025   famotidine (PEPCID) 40 MG tablet Take 1 tablet by mouth daily   FARXIGA 5 MG tablet TAKE 1 TABLET BY MOUTH EVERY MORNING   metoprolol succinate (TOPROL XL) 25 MG extended release tablet Take 1 tablet by mouth daily   simvastatin (ZOCOR) 40 MG tablet TAKE ONE TABLET BY MOUTH ONCE NIGHTLY   fluocinonide (LIDEX) 0.05 % ointment Apply topically 2 times daily Apply topically 2 times daily.   aspirin EC 81 MG EC tablet Take 1 tablet by mouth daily   FETZIMA 120 MG CP24 ER capsule Take 1 capsule by 
RT ASSESSMENT TREATMENT GOALS    [x]Pt Goal:  Pt will identify 1-2 positive coping skills by time of discharge.    []Pt Goal:  Pt will identify 1-2 positive aspects of self by time of discharge.    [x]Pt Goal:  Pt will remain on task/topic for 15-30 minutes during group by time of discharge.    []Pt Goal:  Pt will identify 1-2 aspects of relapse prevention plan by time of discharge.    []Pt Goal:  Pt will join in conversation with peers 1-2 times per group by time of discharge.    []Pt Goal:  Pt will identify 1-2 new leisure interests by time of discharge.    []Pt Goal:  Pt will not voice any delusional content by time of discharge.   
      Imaging/Diagonstics:  MRI BRAIN W WO CONTRAST  Result Date: 6/24/2025  Mild chronic small vessel ischemic changes. No acute brain parenchymal abnormality.     CT HEAD WO CONTRAST  Result Date: 6/23/2025  No acute intracranial abnormality.       Assessment :      Hospital Problems           Last Modified POA    * (Principal) Depression with suicidal ideation 6/25/2025 Yes    Tardive dyskinesia 6/26/2025 Yes       Plan:     Admitted to inpatient psych with dep with suicidal ideations  Neuro was consulted for abnormal movements, due to fetzima?currently on hold.  HTN. Stable, resumed cozaar, Toprol- XL  DM2, glipizide, mdssi, carb restricted diet  Ckd3a. Stable  Chronic smoker. Nicotine patch, advised cessation.  HLD, continue statin  Infrarenal aaa.   PVD, ASA statin  Labs and medications reviewed.       Vitals remained stable blood sugar has been stable, patient on aspirin and statin, HbA1c 6 point  DVT prophylaxis,  Pt mobile   Full code status     Consultations:   IP CONSULT TO INTERNAL MEDICINE      Fanny Sr MD  6/27/2025  10:21 AM    Copy sent to Joanne Kirby, APRN - CNP    Please note that this chart was generated using voice recognition Dragon dictation software.  Although every effort was made to ensure the accuracy of this automated transcription, some errors in transcription may have occurred.

## 2025-06-27 NOTE — GROUP NOTE
Group Therapy Note    Date: 6/27/2025    Group Start Time: 1100  Group End Time: 1130  Group Topic: recreation group     [facility]    Ashley Mehta CTRS        Group Therapy Note    Attendees: 5/9       Patient's Goal:  pt will identify positive coping skills and improve leisure awareness    Notes:  pt was pleasant and participated well    Status After Intervention:  Improved    Participation Level: Active Listener and Interactive    Participation Quality: Appropriate and Sharing      Speech:  normal      Thought Process/Content: Logical      Affective Functioning: congruent      Mood: euthymic       Level of consciousness:  Alert      Response to Learning: Able to verbalize current knowledge/experience, Capable of insight, and Progressing to goal      Endings: None Reported    Modes of Intervention: Education, Support, and Activity      Discipline Responsible: Psychoeducational Specialist      Signature:  TINY KAUFFMAN

## 2025-06-27 NOTE — GROUP NOTE
Group Therapy Note    Date: 6/27/2025    Group Start Time: 1000  Group End Time: 1025  Group Topic: Cognitive Skills    [facility] Ashley German CTRS      Group Therapy Note    Attendees: 5/10       Patient's Goal:  pt will identify positive coping skills and identify a goal for the day    Notes:  pt was pleasant and participated well    Status After Intervention:  Improved    Participation Level: Active Listener and Interactive    Participation Quality: Appropriate and Sharing      Speech:  normal      Thought Process/Content: Logical      Affective Functioning: congruent      Mood:euthymic      Level of consciousness:  Alert      Response to Learning: Able to verbalize current knowledge/experience, Capable of insight, and Progressing to goal      Endings: None Reported    Modes of Intervention: Education, Support, and Activity      Discipline Responsible: Psychoeducational Specialist      Signature:  TINY KAUFFMAN

## 2025-06-27 NOTE — TRANSITION OF CARE
Behavioral Health Transition Record    Patient Name: Annalisa Sams  YOB: 1960   Medical Record Number: [id number]  Date of Admission: 6/25/2025  4:20 PM   Date of Discharge: 6-27-25    Attending Provider: Alfred Hollins MD   Discharging Provider: ISHAN Hollins  To contact this individual call  and ask the  to page.  If unavailable, ask to be transferred to Behavioral Health Provider on call.  A Behavioral Health Provider will be available on call 24/7 and during holidays.    Primary Care Provider: Joanne Ladd APRN - CNP    No Known Allergies    Reason for Admission: Threat to self requiring 24 hour professional observation  A mental disorder causing major disability in social, interpersonal, occupational, and/or educational functioning that is leading to dangerous or life-threatening functioning, and that can only be addressed in an acute inpatient setting   Concerns about patient's safety in the community  Past Mental Health Diagnosis: a history of  Major Depression, Bipolar Disorder, Anxiety Disorder, and Prior suicide attempt  Triggering event or precipitating factor: Financial instability and Relationship Issues  Length of time/duration of triggering event: Days  Legal Status: Involuntary    Admission Diagnosis: Depression with suicidal ideation [F32.A, R45.851]    * No surgery found *    Results for orders placed or performed during the hospital encounter of 06/25/25   POC Glucose Fingerstick   Result Value Ref Range    POC Glucose 139 (H) 65 - 105 mg/dL   POC Glucose Fingerstick   Result Value Ref Range    POC Glucose 136 (H) 65 - 105 mg/dL   POC Glucose Fingerstick   Result Value Ref Range    POC Glucose 127 (H) 65 - 105 mg/dL   POC Glucose Fingerstick   Result Value Ref Range    POC Glucose 113 (H) 65 - 105 mg/dL   POC Glucose Fingerstick   Result Value Ref Range    POC Glucose 136 (H) 65 - 105 mg/dL   POC Glucose Fingerstick   Result Value Ref Range    POC Glucose 105 65

## 2025-06-27 NOTE — DISCHARGE INSTRUCTIONS
Information:  Medications:   Medication summary provided   I understand that I should take only the medications on my list.     -why and when I need to take each medicine.     -which side effects to watch for.     -that I should carry my medication information at all times in case of     Emergency situations.    I will take all of my medicines to follow up appointments.     -check with my physician or pharmacist before taking any new    Medication, over the counter product or drink alcohol.    -Ask about food, drug or dietary supplement interactions.    -discard old lists and update records with medication providers.    Notify Physician:  Notify physician if you notice:   Always call 911 if you feel your life is in danger  In case of an emergency call 911 immediately!  If 911 is not available call your local emergency medical system for help    Behavioral Health Follow Up:  Original Referral Source: Med surg 2078  Discharge Diagnosis: Depression with suicidal ideation [F32.A, R45.851]  Recommendations for Level of Care: Take medication as prescribed and keep all scheduled appointments  Patient status at discharge:  In control, denies any suicidal/homicidal ideations  My hospital  was:  Dr ISHAN Hollins  Aftercare plan faxed:  Matt   -faxed by: staff   -date: 6-27-25   -time: 3:00 pm  Prescriptions: available for  at [facility]    Smoking: Quit Smoking.   Call the [employer]'s smoking quitline at 3-902-00V-QUIT  Know the signs of a heart attack   If you have any of the following symptoms call 911 immediately, do not wait more    Than five minutes.    1. Pressure, fullness and/ or squeezing in the center of the chest spreading to    The jaw, neck or shoulder.    2. Chest discomfort with light headedness, fainting, sweating, nausea or    Shortness of breath.   3. Upper abdominal pressure or discomfort.   4. Lower chest pain, back pain, unusual fatigue, shortness of breath, nausea   Or dizziness.

## 2025-06-28 LAB — VIT B1 PYROPHOSHATE BLD-SCNC: 167 NMOL/L (ref 70–180)

## 2025-06-28 NOTE — DISCHARGE SUMMARY
Provider Discharge Summary     Patient ID:  Annalisa Sams  [id number]  65 y.o.  1960    Admit date: 6/25/2025    Discharge date and time: 6/27/2025  9:00 PM     Admitting Physician: Alfred Hollins MD     Discharge Physician: Alfred Hollins MD    Admission Diagnoses: Depression with suicidal ideation [F32.A, R45.851]    Discharge Diagnoses:      Depression with suicidal ideation     Patient Active Problem List   Diagnosis Code    CKD (chronic kidney disease) stage 3, GFR 30-59 ml/min ([facility]) N18.30    Nephrolith N20.0    Essential hypertension I10    Hyperlipidemia E78.5    Low back pain potentially associated with radiculopathy M54.50    Secondary diabetes mellitus ([facility]) E13.9    Depression with suicidal ideation F32.A, R45.851    History of suicide attempt Z91.51    Major depression in partial remission F32.4    Chronic kidney disease, stage III (moderate) ([facility]) N18.30    History of amputation Z89.9    Poor circulation R09.89    Kidney stone N20.0    Stenosis of carotid artery I65.29    Claudication I73.9    Suicidal ideation R45.851    Lumbar facet arthropathy M47.816    Bilateral carotid artery occlusion I65.23    Bilateral carotid artery stenosis I65.23    Atherosclerosis of native artery of both lower extremities with intermittent claudication I70.213    Ataxia R27.0    Cerebral infarction ([facility]) I63.9    Nicotine dependence F17.200    Mild basilar atelectasis of both lungs J98.11    Hypoxia on admission to icu R09.02    Type 2 diabetes mellitus with chronic kidney disease E11.22    Transient ischemic attack G45.9    Pulmonary emphysema, unspecified emphysema type ([facility]) J43.9    Movement disorder G25.9    Acute cystitis with hematuria N30.01    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

## 2025-07-11 NOTE — THERAPY EVALUATION
[facility]   Outpatient Rehabilitation & Therapy  [address]  P: [phone]   F: [phone]     Physical Therapy Evaluation    Date:  2024  Patient: Annalisa Sams  : 1960  MRN: [id number]  Physician: Kvng Henry MD      Insurance: Aeshahid Hairston ([employer], Auth after eval)  Medical Diagnosis:   M25.511 (ICD-10-CM) - Right shoulder pain, unspecified chronicity   Rehab Codes: M25.511 right shoulder pain, M25.611 Right shoulder stiffness   Onset Date: 2024                                 Next 's appt: None scheduled    Subjective:   CC:Right shoulder pain  HPI: Pain began with no acute event after waking up one morning. Pain is constant. She reports she will feel her right shoulder lock up and making it difficult to move. Pain will go up into neck and down into medial aspect of her arm. She reports having numbness every once in awhile. She reports clicking as well. Worst in the AM and then improves throughout the day. She has not been performing any exercises. She sleeps on her right side with arm extended and she has returned to sleeping on her right side.  Right hand dominant.     Aggravating Factors: laying on right side, reaching, behind her back   Alleviating Factors: tylenol       Per referral:  Work on R shoulder ROM. Biceps/RTC/Scapular stabilizer strengthening. Posterior capsule stretching. Eval and treat. Modalities as needed. Teach home exercise program.   2-3 times a week for 4-6 weeks.    PMHx: [] Unremarkable [x] Diabetes [x] HTN  [] Pacemaker   [] MI/Heart Problems [x] Cancer [] Arthritis [] Asthma                         [x] refer to full medical chart  In EPIC  [x] Other:  vertigo       Comorbidities:   [] Obesity [] Dialysis  [] Other:   [x] Asthma/COPD [] Dementia [] Other:   [x] Stroke [] Sleep apnea [] Other:   [] Vascular disease [] Rheumatic disease [] Other:     Tests: [x] X-Ray: [] MRI:  [] Other:  Xrays: 2 views of the right shoulder  [Home] : at home, [unfilled] , at the time of the visit. [Medical Office: ([facility])___] : at the medical office located in  [Telephone (audio)] : This telephonic visit was provided via audio only technology. [Verbal consent obtained from patient] : the patient, [unfilled] [de-identified] : Pt is a 25 y/o F 2 weeks s/p laparoscopic cholecystectomy who presents today via TEB feeling well here for post op care.  Pt denies any fevers, chest pn, sob, n,v,c,d, or abd pn since sx. Pt states she has been walking daily for exercise and has been tolerating her usual diet without any concerns. Pt understands we do not recommend lifting over 10 lbs x 6 weeks after sx. Reviewed low fat diet recommendations which pt understands and has been following. Path wnl. Pt states her incisions are healing well and denies any pain, bleeding, or oozing. No other concerns at this time.

## (undated) DEVICE — DRESSING TRNSPAR W5XL4.5IN FLM SHT SEMIPERMEABLE WIND

## (undated) DEVICE — LOOP VES W13MM THK09MM MINI RED SIL FLD REPELLENT

## (undated) DEVICE — INTENDED USED TO PROTECT, TAG AND HELP LOCATED SUTURES DURING SURGERY: Brand: STERION®SUTURE AID BOOTIES

## (undated) DEVICE — DRAPE,REIN 53X77,STERILE: Brand: MEDLINE

## (undated) DEVICE — APPLICATOR MEDICATED 26 CC SOLUTION HI LT ORNG CHLORAPREP

## (undated) DEVICE — 3M™ IOBAN™ 2 ANTIMICROBIAL INCISE DRAPE 6650EZ: Brand: IOBAN™ 2

## (undated) DEVICE — DECANTER BAG 9": Brand: MEDLINE INDUSTRIES, INC.

## (undated) DEVICE — GLOVE SURG SZ 7.5 L11.73IN FNGR THK9.8MIL STRW LTX POLYMER

## (undated) DEVICE — SHEET, T, LAPAROTOMY, STERILE: Brand: MEDLINE

## (undated) DEVICE — SINGLE PORT MANIFOLD: Brand: NEPTUNE 2

## (undated) DEVICE — INTENDED FOR TISSUE SEPARATION, AND OTHER PROCEDURES THAT REQUIRE A SHARP SURGICAL BLADE TO PUNCTURE OR CUT.: Brand: BARD-PARKER ® CARBON RIB-BACK BLADES

## (undated) DEVICE — SUTURE PERMAHAND SZ 3-0 L30IN NONABSORBABLE BLK W/O NDL SA84H

## (undated) DEVICE — TUBING SUCT 12FR MAL ALUM SHFT FN CAP VENT UNIV CONN W/ OBT

## (undated) DEVICE — SYRINGE 20ML LL S/C 50

## (undated) DEVICE — GOWN,AURORA,NONREINFORCED,LARGE: Brand: MEDLINE

## (undated) DEVICE — SUTURE NONABSORBABLE MONOFILAMENT 6-0 BV-1 1X30 IN PROLENE 8709H

## (undated) DEVICE — BLUNT CANNULA: Brand: MONOJECT

## (undated) DEVICE — SURGIFOAM SPNG SZ 100

## (undated) DEVICE — COVER,MAYO STAND,STERILE: Brand: MEDLINE

## (undated) DEVICE — GOWN,SIRUS,NONRNF,SETINSLV,XL,20/CS: Brand: MEDLINE

## (undated) DEVICE — SPONGE GZ W4XL4IN RAYON POLY FILL CVR W/ NONWOVEN FAB

## (undated) DEVICE — SUTURE PERMAHAND SZ 4-0 L12X30IN NONABSORBABLE BLK SILK A303H

## (undated) DEVICE — SPONGE LAP W18XL18IN WHT COT 4 PLY FLD STRUNG RADPQ DISP ST

## (undated) DEVICE — LOOP,VESSEL,MINI,BLUE,2/PK,STERILE: Brand: MEDLINE

## (undated) DEVICE — LOOP VES W25MM THK1MM MAXI RED SIL FLD REPELLENT 100 PER

## (undated) DEVICE — CLIP LIG SM TI 6 BLU HNDL FOR OPN AND ENDOSCP SGL APPL

## (undated) DEVICE — CLIP LIG M TI 6 SIL HNDL FOR OPN AND ENDOSCP SGL APPL

## (undated) DEVICE — SUTURE ETHLN SZ 4-0 L18IN NONABSORBABLE BLK L24MM FS-1 3/8 1629H

## (undated) DEVICE — BLANKET WRM W29.9XL79.1IN UP BODY FORC AIR MISTRAL-AIR

## (undated) DEVICE — FOGARTY - HYDRAGRIP SURGICAL - CLAMP INSERTS: Brand: FOGARTY SOFTJAW

## (undated) DEVICE — MERCY HEALTH ST CHARLES: Brand: MEDLINE INDUSTRIES, INC.

## (undated) DEVICE — YANKAUER,FLEXIBLE HANDLE,FINE CAPACITY: Brand: MEDLINE

## (undated) DEVICE — VESSEL LOOPS X-RAY DETECTABLE: Brand: DEROYAL

## (undated) DEVICE — SUTURE VCRL + SZ 2-0 L27IN ABSRB CLR CT-1 1/2 CIR TAPERCUT VCP259H

## (undated) DEVICE — SUTURE NONABSORBABLE MONOFILAMENT 6-0 C-1 1X30 IN PROLENE 8706H

## (undated) DEVICE — BOOT,SUTURE,STANDARD,YELLOW-IN-BLUE: Brand: MEDLINE

## (undated) DEVICE — SOLUTION IV 1000ML 0.9% SOD CHL PH 5 INJ USP VIAFLX PLAS

## (undated) DEVICE — COVER,TABLE,60X90,STERILE: Brand: MEDLINE

## (undated) DEVICE — ST CHARLES MINOR ABDOMINAL PK: Brand: MEDLINE INDUSTRIES, INC.